# Patient Record
Sex: FEMALE | Race: WHITE
[De-identification: names, ages, dates, MRNs, and addresses within clinical notes are randomized per-mention and may not be internally consistent; named-entity substitution may affect disease eponyms.]

---

## 2017-01-25 ENCOUNTER — HOSPITAL ENCOUNTER (EMERGENCY)
Dept: HOSPITAL 75 - ER | Age: 67
Discharge: NURSING, REHAB OR LONG-TERM CARE | End: 2017-01-25
Payer: MEDICARE

## 2017-01-25 VITALS — BODY MASS INDEX: 32.2 KG/M2 | WEIGHT: 175 LBS | HEIGHT: 62 IN

## 2017-01-25 DIAGNOSIS — E11.9: ICD-10-CM

## 2017-01-25 DIAGNOSIS — G89.29: ICD-10-CM

## 2017-01-25 DIAGNOSIS — Z79.84: ICD-10-CM

## 2017-01-25 DIAGNOSIS — I10: ICD-10-CM

## 2017-01-25 DIAGNOSIS — S00.83XA: Primary | ICD-10-CM
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
## 2017-01-25 DIAGNOSIS — J44.9: ICD-10-CM

## 2017-01-25 DIAGNOSIS — M54.2: ICD-10-CM
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
## 2017-01-25 DIAGNOSIS — Z79.02: ICD-10-CM
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
## 2017-01-25 DIAGNOSIS — F17.210: ICD-10-CM

## 2017-01-25 PROCEDURE — 70450 CT HEAD/BRAIN W/O DYE: CPT

## 2017-01-25 PROCEDURE — 72125 CT NECK SPINE W/O DYE: CPT

## 2017-01-25 NOTE — DIAGNOSTIC IMAGING REPORT
PROCEDURE: CT head and CT cervical spine without contrast.



TECHNIQUE: Multiple contiguous axial images were obtained

through the brain and cervical spine without the use of

intravenous contrast. Sagittal and coronal reformations through

the cervical spine were then performed.



INDICATION:  Fall. Head injury.



COMPARISON: CT head without contrast 12/11/2012. Cervical spine

radiographs 5/19/2014.



FINDINGS:



CT HEAD: Generalized cerebral and cerebellar parenchymal volume

loss has progressed since the prior exam. Chronic infarct in the

left chin. Diffuse nonspecific hypointensities in the

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

progressed since the prior exam. No intracranial hemorrhage,

mass effect, hydrocephalus or extra-axial fluid collections.

Intracranial vascular calcifications. Left frontal scalp

hematoma. Osseous structures are intact. Mild mucosal thickening

in the maxillary sinuses.



CT CERVICAL SPINE: Minimal anterolisthesis of C4 on C5 and C5 on

C6 is similar to the prior radiographs. Vertebral body heights

are maintained. No fractures. Moderate degenerative endplate

changes and facet arthropathy. These result in no high-grade

neural impingement on this noncontrast exam. Arterial

calcifications including the carotid bifurcations.



IMPRESSION:

1. Left frontal scalp hematoma. Osseous structures are intact.

2. Interval progression of generalized parenchymal volume loss

and leukoaraiosis. No acute intracranial CT findings.

3. No acute cervical spine CT findings.







Dictated by:



Dictated on workstation # [id number]

## 2017-01-25 NOTE — XMS REPORT
Continuity of Care Document

 Created on: 2015



JAMILA REYNOLDS S

External Reference #: [id number]

: 1950

Sex: Female



Demographics







 Address  [address] [place]  [address]

 

 Home Phone  [phone]

 

 Preferred Language  English

 

 Marital Status  Unknown

 

 Church Affiliation  Unknown

 

 Race  Unknown

 

 Ethnic Group  Unknown





Author







 Author  [facility]

 

 Organization  [facility]

 

 Address  Unknown

 

 Phone  Unavailable







Support







 Name  Relationship  Address  Phone

 

 RODY MOSER DO  Caregiver  [address]

[place]  [address] [phone]

 

 COLLEEN BARNEY  Caregiver  [address] [place]  [address] [phone]

 

 HUANG ARIAS MD  Caregiver  [address], [address]

[place]  [address] [phone]

 

 DENISE GRUBER  Next Of Kin  [address] [place]  [address] [phone]







Care Team Providers







 Care Team Member Name  Role  Phone

 

 RODY MOSER DO  PCP  [phone]







Insurance Providers







 Payer Name  Policy Number  Subscriber Name  Relationship

 

 [employer]  [id number]  Jamila Reynolds S  16 Self / Same As Patient

 

 [employer]  [id number]  Jamila Reynolds S  16 Self / Same As Patient







Advance Directives







 Directive  Response  Recorded Date/Time

 

 Advance Directives  No  01/24/15 4:21pm

 

 Organ Donor  No  01/24/15 4:21pm

 

 Resuscitation Status  Full Code  01/24/15 4:21pm







Problems

No known problems or medical conditions.



Medications







 Medication  Dose  Route  Sig  Days/Qty  Instructions  Order Date  Discontinued 
Date  Status

 

 Pantoprazole Sodium  40 Mg  PO  DAILY        08     Active

 

 Atorvastatin Calcium  40 Mg  PO  DAILY        08     Active

 

 Glyburide                 08  Discontinued

 

 Metformin HCl (Glucophage)  500 Mg  PO  TWICE A DAY     1 TAB BY MOUTH BEFORE 
BREAKFAST AND 1 TAB EVERY EVENING  08     Active

 

 Aspirin  325 Mg  PO  DAILY        08  Discontinued

 

 Amlodipine Besylate  5 Mg  PO  DAILY        07/01/08  01/24/15  Discontinued

 

 Quinapril HCl  20 Mg  PO  DAILY        08     Active

 

 Zoloft                 08  Discontinued

 

 Dexlansoprazole  60 Mg  PO           12  Discontinued

 

 Sertraline HCl  100 Mg  PO  DAILY        12  Discontinued

 

 Cephalexin Monohydrate (Keflex)  500 Mg  PO  THREE TIMES A DAY        12  Discontinued

 

 [Proair Inhaler]  1 Puff  IH  THREE TIMES A DAY PRN        12     Active

 

 Clopidogrel Bisulfate  1 Each  PO  DAILY        12     Active

 

 Naproxen  1 Each  PO  THREE TIMES A DAY PRN PAIN  20 Qty     14     
Active

 

 Orphenadrine Citrate  100 Mg  PO  TWICE A DAY  10 Qty  FOR MUSCLE SPASMS  04/07
/14  01/24/15  Discontinued

 

 Tramadol HCl  50 Mg  PO  Q4-6HR PRN PAIN  20 Qty     14     Active

 

 Amlodipine Besylate (Norvasc 5 Mg)           30 Qty     01/24/15     Active

 

 Quinapril Hcl           30 Qty     01/24/15     Active

 

 Naproxen           60 Qty     01/24/15     Active

 

 Tramadol Hcl           60 Qty     01/24/15     Active

 

 Metformin Hcl           60 Qty     01/24/15     Active

 

 Prednisone  40 Mg  PO  DAILY  10 Qty     01/24/15     Active

 

 Cyclobenzaprine HCl (Flexeril)  1 Each  PO  EVERY 8HRS PRN SPASMS  10 Qty     
15  01/24/15  Discontinued

 

 Baclofen (Lioresal)  1 Each  PO  TID PRN PRN SPASMS  10 Qty     01/24/15     
Active







Social History







 Social History Problem  Response  Recorded Date/Time

 

 Alcohol Use  Denies Use  2015 4:21pm

 

 Recreational Drug Use  No  2015 4:21pm

 

 Recent Foreign Travel  No  2015 4:19pm

 

 Smoking Status  Current Everyday Smoker  2015 4:21pm









 Query  Response  Start Date  Stop Date

 

 Smoking Status  Current Everyday Smoker      







Hospital Discharge Instructions

No hospital discharge instructions.



Plan of Care

No plan of care.



Functional Status

No functional status results.



Allergies, Adverse Reactions, Alerts







 Allergen  Type  Severity  Reaction  Status  Last Updated

 

 Penicillins ([id number])  Allergy  Unknown     Active  08

 

 morphine  Allergy  Mild     Active  14







Immunizations

No immunization records.



Vital Signs

Acute Vital Signs





 Vital  Response  Date/Time

 

 Temperature (Fahrenheit)  97.4 degrees F (97.6 - 99.5)   

 

 Temperature (Calculated Celsius)  36.24433 degrees C (36.4 - 37.5)   

 

 Temperature Source  Temporal     

 

 Pulse Rate (adult)  82 bpm (60 - 90)   

 

 Respiratory Rate  18 bpm (12 - 24)   

 

 O2 Sat by Pulse Oximetry  95 % (88 - 100)   

 

 Blood Pressure  149/83 mm Hg   

 

 Pain      

 

    Pain Intensity  10     

 

 Height (Feet)  5 feet    

 

 Height (Calculated Centimeters)  152.453516 cm    

 

 Weight (Pounds)  237 pounds    

 

 Weight (Calculated Grams)  724452.393 gm    

 

 Weight (Calculated Kilograms)  107.316874 kilograms    

 

 Height  5 ft 0 in    

 

 Weight  237 lb    

 

 Body Mass Index  46.3 kg/m^2    







Results

No known relevant diagnostic tests, laboratory data and/or discharge summary.



Procedures

No known history of procedures.



Encounters







 Encounter  Location  Date/Time

 

 Departed Emergency Room  [facility]  01/24/15 4:11pm











 Recent Diagnosis

## 2017-01-25 NOTE — ED FALL/INJURY
General


Chief Complaint:  Trauma-Non Activation


Stated Complaint:  FALL/HEAD INJURY


Nursing Triage Note:  


PT ARRIVED PER EMS, PT FELL OUT OF BED AT [facility] THIS AM, PT DENIES LOC, PT STATES 


ONLY HURTS WHERE BUMP ON HEAD IS, DENIES NEW NECK PAIN, STATES ALWAYS HAS NECK 


PAIN.


Source:  patient, EMS, nursing home records





History of Present Illness


Time seen by provider:  10:22


Initial Comments


[facility]


PT STATES SHE WAS LAYING IN BED AND LEANED OVER AND WAS REACHING DOWN TO GET 

HER SHOES AND FELL OFF THE BED, AND HIT HER HEAD ON A TRASH CAN--OCCURRED JUST  

PRIOR TO ARRIVAL


NO LOSS OF CONSCIOUSNESS


NO DIZZINESS


NO VISION CHANGES


NO NAUSEA/VOMITING


NO PARESTHESIAS OR MOTOR DEFICITS


NO CHANGES IN CHRONIC NECK PAIN 


NO OTHER INJURIES








PT STATES SHE HAD NOT HAD ANY OF HER MORNING MEDICATIONS UNTIL IMMEDIATELY 

PRIOR TO ARRIVAL


Location Injury Occurred:  [facility]





PCP: DR. MOSER





Allergies and Home Medications


Allergies


Coded Allergies:  


     morphine (Unverified  Allergy, Mild, 14)


 "ACTS WEIRD"


     Penicillins (Unverified  Allergy, Unknown, 08)





Home Medications


  1 PUFF IH TID PRN PRN  (Reported) 


Amlodipine Besylate 5 Mg Tablet #30 (Reported) 


Atorvastatin 20 Mg Tablet 40 MG PO DAILY (Reported) 


Baclofen 10 Mg Tablet #10 1 EACH PO TID PRN PRN PRN SPASMS 


   Prescribed by: COLLEEN BARNEY on 1/24/15 1756


Clopidogrel Bisulfate 75 Mg Tablet 1 EACH PO DAILY (Reported) 


Metformin Hcl 500 Mg Tablet 500 MG PO BID (Reported) 


   1 TAB BY MOUTH BEFORE BREAKFAST AND 1 TAB EVERY EVENING DO NOT START UNTIL 12

-13-12 


Metformin Hcl 500 Mg Tablet #60 (Reported) 


Naproxen 500 Mg Tablet #20 1 EACH PO TID PRN PRN PAIN 


   FOR PAIN 


   Prescribed by: VICTORINA LUNSFORD on 14 1612


Naproxen 500 Mg Tablet #60 (Reported) 


Pantoprazole Sodium 40 Mg Tablet.dr 40 MG PO DAILY (Reported) 


Prednisone 20 Mg Tab #10 40 MG PO DAILY 


   Prescribed by: COLLEEN BARNEY on 1/24/15 1724


Quinapril Hcl 20 Mg Tablet 20 MG PO DAILY (Reported) 


Quinapril Hcl 20 Mg Tablet #30 (Reported) 


Tramadol Hcl 50 Mg Tab #20 50 MG PO Q4-6HR PRN PRN PAIN 


   FOR PAIN 


   Prescribed by: VICTORINA LUNSFORD on 14 1612


Tramadol Hcl 50 Mg Tab #60 (Reported) 





Constitutional:   no symptoms reported other (HEMATOMA TO LEFT FOREHEAD)


Eyes:   No Symptoms Reported


Ears, Nose, Mouth, Throat:   no symptoms reported


Respiratory:   no symptoms reported


Cardiovascular:   no symptoms reported


Gastrointestinal:   no symptoms reported


Genitourinary:   no symptoms reported


Musculoskeletal:   no symptoms reported


Skin:   no symptoms reported


Psychiatric/Neurological:   No Symptoms Reported





Past Medical-Social-Family Hx


Patient Social History


Alcohol Use:  Past History (NONE X 10-15 YEARS, PER PT ON 17)


Recreational Drug Use:  No


Smoking Status:  Current Everyday Smoker (2 1/2 PPD, NOW 1/2 PPD)


Type Used:  Cigarettes


Recent Foreign Travel:  No


Contact w/Someone Who Travel:  No


Recent Infectious Disease Expo:  No


Recent Hopitalizations:  No (QUIQUE-UNSURE WHAT FOR AND WHEN)


Physical Abuse Screen:  No


Sexual Abuse:  No





Seasonal Allergies


Seasonal Allergies:  No





Surgeries


HX Surgeries:  Yes (CATARACTS)


Surgeries:   Section, Eye Surgery, Gallbladder





Respiratory


Hx Respiratory Disorders:  Yes


Respiratory Disorders:  COPD





Cardiovascular


Hx Cardiac Disorders:  Yes


Cardiac Disorders:  Coronary Artery Disease, High Cholesterol, Hypertension





Neurological


Hx Neurological Disorders:  Yes


Neurological Disorders:  Stroke





Reproductive System


Hx Reproductive Disorders:  No





Genitourinary


Hx Genitourinary Disorders:  Yes (INCONTINENT)





Gastrointestinal


Hx Gastrointestinal Disorders:  Yes


Gastrointestinal Disorders:  Chronic Constipation





Musculoskeletal


Hx Musculoskeletal Disorders:  Yes (CHRONIC NECK AND BACK PAIN ; UNSTEADY GAIT 

AND GENERALIZED WEAKNESS)


Musculoskeletal Disorders:  Arthritis, Chronic Back Pain





Endocrine


Hx Endocrine Disorders:  Yes


Endocrine Disorders:  Diabetes, Non-Insulin dep





Cancer


Hx Cancer:  No





Psychosocial


Hx Psychiatric Problems:  Yes


Behavioral Health Disorders:  Depression





Integumentary


HX Skin/Integumentary Disorder:  No





Blood Transfusions


Hx Blood Disorders:  No





Physical Exam


Vital Signs





 Vital Sign - Last 12Hours








 17





 10:21


 


Temp 97.9


 


Pulse 68


 


Resp 18


 


B/P 198/79


 


Pulse Ox 99





Capillary Refill : Less Than 3 Seconds


General Appearance:   WD/WN no apparent distress


HEENT:   PERRL/EOMI TMs normal other (LARGE HEMATOMA TO LEFT FOREHEAD.   POOR 

DENTITON WITH MULTIPLE MISSING TEETH)


Neck:   tender lateral tender midline other (PT STATES IS CHRONIC AND NO 

DIFFERENT THAN NORMAL)


Cardiovascular:   regular rate, rhythm no murmur


Respiratory:   normal breath sounds no respiratory distress no accessory muscle 

use


Gastrointestinal:   non tender soft


Back:   no CVA tenderness no vertebral tenderness


Extremities:   normal range of motion non-tender normal inspection no pedal 

edema no calf tenderness


Neurologic/Psychiatric:   CNs II-XII nml as tested no motor/sensory deficits 

alert normal mood/affect oriented x 3


Skin:   normal color warm/dry





[place] Coma Score


Best Eye Response:  (4) Open Spontaneously


Best Verbal Response:  (5) Oriented


Best Motor Response:  (6) Obeys Commands


Arpit Total:  15





Progress/Results/Core Measures


Results/Orders


My Orders





 Orders-VICTORINA LUNSFORD DO


Ct Head/Cervical Spine Wo (17 10:31)





Vital Signs/I&O





 Vital Sign - Last 12Hours








 17





 10:21


 


Temp 97.9


 


Pulse 68


 


Resp 18


 


B/P 198/79


 


Pulse Ox 99








Blood Pressure Mean:  118


Progress Note :  


Progress Note


PT REFUSES TO HAVE BP TAKEN AGAIN





Diagnostic Imaging





Comments


CT HEAD/CERVICAL SPINE--LEFT FRONTAL SCALP HEMATOMA, OTHERWISE NO ACUTE PROCESS

, CHRONIC CHANGES--PER RADIOLOGIST REPORT @ 1113


   Reviewed:  Reviewed by Me





Departure


Impression


Impression:  


 Primary Impression:  


 S/P FALL


 Additional Impressions:  


 HEAD CONTUSION WITH HEMATOMA


 EXACERABATION OF CHRONIC NECK PAIN


Disposition:  [facility] XFER SNF


Condition:  Stable





Departure-Patient Inst.


Referrals:  


RODY MOSER DO (PCP/Family)


Primary Care Physician


Patient Instructions:  HEMATOMA, Minor Head Injury (DC), Preventing Falls in 

the Older Adult





Add. Discharge Instructions:


ICE TO AREA AT 20 MINUTE INTERVALS





TYLENOL AS NEEDED FOR PAIN 





CONTINUE YOUR REGULAR MEDICATIONS AS PRESCRIBED





FOLLOW UP WITH YOUR DR AS NEEDED





All discharge instructions reviewed with patient and/or family. Voiced 

understanding.








VICTORINA LUNSFORD DO 2017 11:09

## 2017-02-19 ENCOUNTER — HOSPITAL ENCOUNTER (EMERGENCY)
Dept: HOSPITAL 75 - ER | Age: 67
Discharge: HOME | End: 2017-02-19
Payer: MEDICARE

## 2017-02-19 VITALS — WEIGHT: 175.63 LBS | HEIGHT: 62 IN | BODY MASS INDEX: 32.32 KG/M2

## 2017-02-19 VITALS — SYSTOLIC BLOOD PRESSURE: 155 MMHG | DIASTOLIC BLOOD PRESSURE: 84 MMHG

## 2017-02-19 DIAGNOSIS — S00.03XA: Primary | ICD-10-CM
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
## 2017-02-19 DIAGNOSIS — M51.36: ICD-10-CM
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
## 2017-02-19 DIAGNOSIS — Z79.02: ICD-10-CM

## 2017-02-19 DIAGNOSIS — Z79.84: ICD-10-CM

## 2017-02-19 DIAGNOSIS — Z79.899: ICD-10-CM

## 2017-02-19 DIAGNOSIS — M54.5: ICD-10-CM

## 2017-02-19 DIAGNOSIS — J44.9: ICD-10-CM

## 2017-02-19 DIAGNOSIS — F17.210: ICD-10-CM

## 2017-02-19 LAB
BILIRUB UR QL STRIP: NEGATIVE
KETONES UR QL STRIP: NEGATIVE
LEUKOCYTE ESTERASE UR QL STRIP: NEGATIVE
NITRITE UR QL STRIP: NEGATIVE
PH UR STRIP: 6.5 [PH] (ref 5–9)
PROT UR QL STRIP: (no result)
RENAL EPI CELLS #/AREA URNS HPF: (no result) /HPF
SP GR UR STRIP: 1.01 (ref 1.02–1.02)
SQUAMOUS #/AREA URNS HPF: (no result) /HPF
UROBILINOGEN UR-MCNC: NORMAL MG/DL
WBC #/AREA URNS HPF: (no result) /HPF

## 2017-02-19 PROCEDURE — 81000 URINALYSIS NONAUTO W/SCOPE: CPT

## 2017-02-19 PROCEDURE — 72100 X-RAY EXAM L-S SPINE 2/3 VWS: CPT

## 2017-02-19 PROCEDURE — 73030 X-RAY EXAM OF SHOULDER: CPT

## 2017-02-19 PROCEDURE — 73080 X-RAY EXAM OF ELBOW: CPT

## 2017-02-19 PROCEDURE — 99283 EMERGENCY DEPT VISIT LOW MDM: CPT

## 2017-02-19 NOTE — DIAGNOSTIC IMAGING REPORT
INDICATION: Fall.



FINDINGS: 3 views show no fractures or dislocations of the

elbow. Mild degenerative changes are present. No joint effusion.



IMPRESSION: No acute abnormalities.



Dictated by:



Dictated on workstation # [id number]

## 2017-02-19 NOTE — DIAGNOSTIC IMAGING REPORT
INDICATION: Fall. Low back pain.



FINDINGS: 3 views show good alignment of the vertebral bodies.

Body height is well maintained with no compression fracture.

Facets are in good alignment. No evidence of pars defect. Mild

degenerative disc disease noted throughout. Aorta is calcified

without evidence of aneurysm.



IMPRESSION: Degenerative disc and facet disease with no acute

abnormalities demonstrated.



Dictated by:



Dictated on workstation # [id number]

## 2017-02-19 NOTE — ED UPPER EXTREMITY
General


Chief Complaint:  Trauma-Non Activation


Stated Complaint:  FALL


Source:  patient, EMS


Exam Limitations:  no limitations





History of Present Illness


Time seen by provider:  09:27


Initial Comments


Patient presents after having a fall approximately 5:30 this morning after she 

got up she said she was a little disoriented and fell from standing at the 

floor with her head and left arm on the elbow. She still having pain in her 

left arm and is splinting it against her side. She is left-handed. She has a 

goose egg on her left side of her parietal skull that is not very tender. She 

notes that her shoulder and elbow are 11 out of 10 pain but does not desire any 

pain medicine at this time. She   states she has pain in her lower back on top 

of her chronic pain that is new since her fall this morning. She lives in 

nursing home and had help getting up and was examined by the nurse there 

appears no immediate concern for open/overt fracture/dislocation but her doctor 

asked that she be brought to the ER for x-ray. No loss of consciousness.





Allergies and Home Medications


Allergies


Coded Allergies:  


     morphine (Unverified  Allergy, Mild, 14)


 "ACTS WEIRD"


     Penicillins (Unverified  Allergy, Unknown, 08)





Home Medications


  1 PUFF IH TID PRN PRN  (Reported) 


Amlodipine Besylate 5 Mg Tablet #30 (Reported) 


Atorvastatin 20 Mg Tablet 40 MG PO DAILY (Reported) 


Baclofen 10 Mg Tablet #10 1 EACH PO TID PRN PRN PRN SPASMS 


   Prescribed by: COLLEEN BARNEY on 1/24/15 1756


Clopidogrel Bisulfate 75 Mg Tablet 1 EACH PO DAILY (Reported) 


Metformin Hcl 500 Mg Tablet 500 MG PO BID (Reported) 


   1 TAB BY MOUTH BEFORE BREAKFAST AND 1 TAB EVERY EVENING DO NOT START UNTIL 12

-13-12 


Metformin Hcl 500 Mg Tablet #60 (Reported) 


Naproxen 500 Mg Tablet #20 1 EACH PO TID PRN PRN PAIN 


   FOR PAIN 


   Prescribed by: VICTORINA LUNSFORD on 14 1612


Naproxen 500 Mg Tablet #60 (Reported) 


Pantoprazole Sodium 40 Mg Tablet.dr 40 MG PO DAILY (Reported) 


Prednisone 20 Mg Tab #10 40 MG PO DAILY 


   Prescribed by: COLLEEN BARNEY on 1/24/15 1724


Quinapril Hcl 20 Mg Tablet 20 MG PO DAILY (Reported) 


Quinapril Hcl 20 Mg Tablet #30 (Reported) 


Tramadol Hcl 50 Mg Tab #20 50 MG PO Q4-6HR PRN PRN PAIN 


   FOR PAIN 


   Prescribed by: VICTORINA LUNSFORD on 14 1612


Tramadol Hcl 50 Mg Tab #60 (Reported) 





Constitutional:  No chills, No fever, No malaise


EENTM:  No ear pain, No eye pain


Respiratory:  No cough, No wheezing


Cardiovascular:  No chest pain, No edema, No syncope


Gastrointestinal:  No abdominal pain,  constipationNo diarrhea


Genitourinary:  No dysuria, No frequency


Musculoskeletal:   back painNo joint pain, No joint swelling


Skin:  No pruritus, No rash


Psychiatric/Neurological:  Denies Numbness, Denies Paresthesia





Past Medical-Social-Family Hx


Patient Social History


Alcohol Use:  Denies Use


Recreational Drug Use:  No


Smoking Status:  Current Everyday Smoker


Type Used:  Cigarettes


Recent Hopitalizations:  No (QUIQUE-UNSURE WHAT FOR AND WHEN)





Seasonal Allergies


Seasonal Allergies:  No





Surgeries


HX Surgeries:  Yes (CATARACTS)


Surgeries:   Section, Eye Surgery, Gallbladder





Respiratory


Hx Respiratory Disorders:  Yes


Respiratory Disorders:  COPD





Cardiovascular


Hx Cardiac Disorders:  Yes


Cardiac Disorders:  Coronary Artery Disease, High Cholesterol, Hypertension





Neurological


Hx Neurological Disorders:  Yes


Neurological Disorders:  Stroke





Reproductive System


Hx Reproductive Disorders:  No





Genitourinary


Hx Genitourinary Disorders:  Yes (INCONTINENT)





Gastrointestinal


Hx Gastrointestinal Disorders:  Yes


Gastrointestinal Disorders:  Chronic Constipation





Musculoskeletal


Hx Musculoskeletal Disorders:  Yes (CHRONIC NECK AND BACK PAIN ; UNSTEADY GAIT 

AND GENERALIZED WEAKNESS)


Musculoskeletal Disorders:  Arthritis, Chronic Back Pain





Endocrine


Hx Endocrine Disorders:  Yes


Endocrine Disorders:  Diabetes, Non-Insulin dep





Cancer


Hx Cancer:  No





Psychosocial


Hx Psychiatric Problems:  Yes


Behavioral Health Disorders:  Depression





Integumentary


HX Skin/Integumentary Disorder:  No





Blood Transfusions


Hx Blood Disorders:  No





Physical Exam


Vital Signs





 Vital Sign - Last 12Hours








 17





 09:31


 


Temp 98.4


 


Pulse 69


 


Resp 18


 


B/P 195/92


 


Pulse Ox 97


 


O2 Delivery Room Air





Capillary Refill :


General Appearance:   WD/WN no apparent distress


HEENT:   PERRL/EOMI normal ENT inspection TMs normal pharynx normal other (

hematoma left parietal.)


Neck:   non-tender full range of motion supple normal inspection


Cardiovascular:   normal peripheral pulses regular rate, rhythm no edema


Respiratory:   chest non-tender lungs clear normal breath sounds


Gastrointestinal:   normal bowel sounds non tender soft


Back:   normal inspection no CVA tenderness vertebral tenderness (lateral to 

the lumbar 1 through 3 region)


Shoulder:   normal inspectionNo asymmetry, No deformity,  pain (tender to 

palpation superior to the glenohumeral joint)


Elbow/Forearm:  normal inspection, non-tender (over lateral or medial 

epicondyles but there is some tenderness along the distal third of the humerus.)


Wrist:  Yes normal inspection, Yes non-tender, Yes no evidence of injury


Hand:  normal inspection, non-tender


Neurologic/Tendon:   normal sensation normal motor functions normal tendon 

functions responds to pain


Neurologic/Psychiatric:   CNs II-XII nml as tested alert normal mood/affect 

oriented x 3


Skin:   normal color warm/dry


Lymphatic:   no adenopathy





Progress/Results/Core Measures


Results/Orders


Lab Results





 Laboratory Tests








Test


  17


10:20 Range/Units


 


 


Urine Bacteria TRACE   /HPF


 


Urine Bilirubin NEGATIVE  NEGATIVE  


 


Urine Casts NONE   /LPF


 


Urine Clarity CLEAR   


 


Urine Color YELLOW   


 


Urine Crystals NONE   /LPF


 


Urine Culture Indicated NO   


 


Urine Glucose (UA) NEGATIVE  NEGATIVE  


 


Urine Ketones NEGATIVE  NEGATIVE  


 


Urine Leukocyte Esterase NEGATIVE  NEGATIVE  


 


Urine Mucus NEGATIVE   /LPF


 


Urine Nitrite NEGATIVE  NEGATIVE  


 


Urine Protein 2+ H NEGATIVE  


 


Urine RBC NONE   /HPF


 


Urine RBC (Auto) NEGATIVE  NEGATIVE  


 


Urine Renal Epithelial Cells NONE   /HPF


 


Urine Specific Gravity 1.010 L 1.016-1.022  


 


Urine Squamous Epithelial


Cells 0-2 


   /HPF


 


 


Urine Urobilinogen NORMAL  NORMAL  MG/DL


 


Urine WBC NONE   /HPF


 


Urine pH 6.5  5-9  








My Orders





 Orders-CESAR MILLS


Ua Culture If Indicated (17 09:35)


Shoulder, Left, 3 Views (17 09:35)


Elbow, Left, 3 Views (17 09:35)


Lumbar Spine - 2-3 Views (17 09:35)


Acetaminophen  Tablet (Tylenol  Tablet) (17 12:39)





Vital Signs/I&O





 Vital Sign - Last 12Hours








 17





 09:31 12:48


 


Temp 98.4 98.4


 


Pulse 69 74


 


Resp 18 16


 


B/P 195/92 


 


Pulse Ox 97 97


 


O2 Delivery Room Air 








Progress Note :  


   Time:  09:50


Progress Note


We'll obtain a urinalysis looking for signs of a urinary tract infection given 

no other overt reason for her fall other understanding early in the morning. X-

ray shoulder and left elbow as well as lumbar spine 3 views. Tenderness on the 

spine is paraspinous so may be more amenable to muscle relaxers if no osseous 

fracture seen. Patient declining any pain meds at this time. States she does 

not believe in pain meds. Declines Muscle relaxant.


UA Neg





Diagnostic Imaging





   Diagonstic Imaging:  Xray


   Plain Films/CT/US/NM/MRI:  elbow, other (shoulder and L-spine)


Comments


No acute osseous fractures noted. No tumor, mass lesion or signs of infection. 

Mild-to-moderate degenerative joint disease noted.





NAME:      DESTINEY REYNOLDS


[facility] REC#:   [id number]


ACCOUNT#:   [id number]


PT STATUS:   REG ER


:      1950


PHYSICIAN:    CESAR MILLS MD


ADMIT DATE:   17/ER


 ***Signed***


Date of Exam:   17





ELBOW, LEFT, 3 VIEWS


 


INDICATION: Fall.





FINDINGS: 3 views show no fractures or dislocations of the


elbow. Mild degenerative changes are present. No joint effusion.





IMPRESSION: No acute abnormalities.





Dictated by:





Dictated on workstation # [id number]





Dict:   17 1005


Trans:   17 1009


DIAN  [id number]





Interpreted by:       SKYLAR PATRICIO MD


Electronically signed by:SKYLAR PATRICIO MD   17 1012


NAME:      DESTINEY REYNOLDS


[facility] REC#:   [id number]


ACCOUNT#:   [id number]


PT STATUS:   REG ER


:      1950


PHYSICIAN:    CESAR MILLS MD


ADMIT DATE:   17/ER


 ***Signed***


Date of Exam:   17





LUMBAR SPINE - 2-3 VIEWS


 


INDICATION: Fall. Low back pain.





FINDINGS: 3 views show good alignment of the vertebral bodies.


Body height is well maintained with no compression fracture.


Facets are in good alignment. No evidence of pars defect. Mild


degenerative disc disease noted throughout. Aorta is calcified


without evidence of aneurysm.





IMPRESSION: Degenerative disc and facet disease with no acute


abnormalities demonstrated.





Dictated by:





Dictated on workstation # [id number]





Dict:   17 1005


Trans:   17 1009


DIAN  [id number]





Interpreted by:       SKYLAR PATRICIO MD


Electronically signed by:SKYLAR PATRICIO MD   17 1012


   [facility]


   [place], [place]





NAME:   DESTINEY REYNOLDS


[facility] REC#:   [id number]


ACCOUNT#:   [id number]


PT STATUS:   REG ER


:   1950


PHYSICIAN:   CESAR MILLS MD


ADMIT DATE:   17/ER


   ***Draft***


Date of Exam:17





SHOULDER, LEFT, 3 VIEWS














INDICATION: Shoulder pain. Fall.





FINDINGS: 3 views show normal articulation of the humeral head


with the glenoid. There is calcification noted on the AP view


which likely represents some osteophyte formation along the


posterior glenoid. No definite fractures are seen. AC joint is


in good alignment.





IMPRESSION: Small calcification noted overlying the inferior


aspect of the humeral head on the AP view only is felt most


likely to represent an osteophyte off of the glenoid. Clinical


correlation for focal pain. If additional imaging is indicated


CT scan of the shoulder would help differentiate.





Dictated on workstation # [id number]








Dict:   17 1006


Trans:   17 1010


[facility] 9431-3592





Interpreted by:     SKYLAR PATRICIO MD


Electronically signed by:


   Reviewed:  Reviewed by Me





Departure


Impression


Impression:  


 Primary Impression:  


 Fall from standing


 Qualified Code:  W19.XXXA - Unspecified fall, initial encounter


Disposition:  01 HOME, SELF-CARE


Condition:  Stable





Departure-Patient Inst.


Referrals:  


RODY MOSER DO (PCP/Family)


Primary Care Physician


Patient Instructions:  Preventing Falls in the Older Adult





Add. Discharge Instructions:


You have had a fall. The x-rays did not show any signs of an acute fracture. 

You did have some mild degenerative changes that are consistent with 

osteoarthritis or chronic arthritis. Your urine was checked for infection and 

found to be clean. Use the call lights and staff to help you as needed. 





All discharge instructions reviewed with patient and/or family. Voiced 

understanding.





Copy


Copies To 1:   RODY MOSER TITUS J 2017 09:34

## 2017-02-19 NOTE — XMS REPORT
Continuity of Care Document

 Created on: 2015



JAMILA REYNOLDS S

External Reference #: [id number]

: 1950

Sex: Female



Demographics







 Address  [address] [place]  [address]

 

 Home Phone  [phone]

 

 Preferred Language  English

 

 Marital Status  Unknown

 

 Mandaen Affiliation  Unknown

 

 Race  Unknown

 

 Ethnic Group  Unknown





Author







 Author  [facility]

 

 Organization  [facility]

 

 Address  Unknown

 

 Phone  Unavailable







Support







 Name  Relationship  Address  Phone

 

 RODY MOSER DO  Caregiver  [address]

[place]  [address] [phone]

 

 COLLEEN BARNEY  Caregiver  [address] [place]  [address] [phone]

 

 HUANG ARIAS MD  Caregiver  [address]

[place]  [address] [phone]

 

 DENISE GRUBER  Next Of Kin  [address] [place]  [address] [phone]







Care Team Providers







 Care Team Member Name  Role  Phone

 

 RODY MOSER DO  PCP  [phone]







Insurance Providers







 Payer Name  Policy Number  Subscriber Name  Relationship

 

 [employer]  [id number]  Jamila Reynolds S  18 Self / Same As Patient

 

 [employer]  [id number]  Jamila Reynolds S  18 Self / Same As Patient







Advance Directives







 Directive  Response  Recorded Date/Time

 

 Advance Directives  No  01/24/15 4:21pm

 

 Organ Donor  No  01/24/15 4:21pm

 

 Resuscitation Status  Full Code  01/24/15 4:21pm







Problems

No known problems or medical conditions.



Medications







 Medication  Dose  Route  Sig  Days/Qty  Instructions  Order Date  Discontinued 
Date  Status

 

 Pantoprazole Sodium  40 Mg  PO  DAILY        08     Active

 

 Atorvastatin Calcium  40 Mg  PO  DAILY        08     Active

 

 Glyburide                 08  Discontinued

 

 Metformin HCl (Glucophage)  500 Mg  PO  TWICE A DAY     1 TAB BY MOUTH BEFORE 
BREAKFAST AND 1 TAB EVERY EVENING  08     Active

 

 Aspirin  325 Mg  PO  DAILY        08  Discontinued

 

 Amlodipine Besylate  5 Mg  PO  DAILY        07/01/08  01/24/15  Discontinued

 

 Quinapril HCl  20 Mg  PO  DAILY        08     Active

 

 Zoloft                 08  Discontinued

 

 Dexlansoprazole  60 Mg  PO           12  Discontinued

 

 Sertraline HCl  100 Mg  PO  DAILY        12  Discontinued

 

 Cephalexin Monohydrate (Keflex)  500 Mg  PO  THREE TIMES A DAY        12  Discontinued

 

 [Proair Inhaler]  1 Puff  IH  THREE TIMES A DAY PRN        12     Active

 

 Clopidogrel Bisulfate  1 Each  PO  DAILY        12     Active

 

 Naproxen  1 Each  PO  THREE TIMES A DAY PRN PAIN  20 Qty     14     
Active

 

 Orphenadrine Citrate  100 Mg  PO  TWICE A DAY  10 Qty  FOR MUSCLE SPASMS  04/07
/14  01/24/15  Discontinued

 

 Tramadol HCl  50 Mg  PO  Q4-6HR PRN PAIN  20 Qty     14     Active

 

 Amlodipine Besylate (Norvasc 5 Mg)           30 Qty     01/24/15     Active

 

 Quinapril Hcl           30 Qty     01/24/15     Active

 

 Naproxen           60 Qty     01/24/15     Active

 

 Tramadol Hcl           60 Qty     01/24/15     Active

 

 Metformin Hcl           60 Qty     01/24/15     Active

 

 Prednisone  40 Mg  PO  DAILY  10 Qty     01/24/15     Active

 

 Cyclobenzaprine HCl (Flexeril)  1 Each  PO  EVERY 8HRS PRN SPASMS  10 Qty     
15  01/24/15  Discontinued

 

 Baclofen (Lioresal)  1 Each  PO  TID PRN PRN SPASMS  10 Qty     01/24/15     
Active







Social History







 Social History Problem  Response  Recorded Date/Time

 

 Alcohol Use  Denies Use  2015 4:21pm

 

 Recreational Drug Use  No  2015 4:21pm

 

 Recent Foreign Travel  No  2015 4:19pm

 

 Smoking Status  Current Everyday Smoker  2015 4:21pm









 Query  Response  Start Date  Stop Date

 

 Smoking Status  Current Everyday Smoker      







Hospital Discharge Instructions

No hospital discharge instructions.



Plan of Care

No plan of care.



Functional Status

No functional status results.



Allergies, Adverse Reactions, Alerts







 Allergen  Type  Severity  Reaction  Status  Last Updated

 

 Penicillins ([id number])  Allergy  Unknown     Active  08

 

 morphine  Allergy  Mild     Active  14







Immunizations

No immunization records.



Vital Signs

Acute Vital Signs





 Vital  Response  Date/Time

 

 Temperature (Fahrenheit)  97.4 degrees F (97.6 - 99.5)   

 

 Temperature (Calculated Celsius)  36.13416 degrees C (36.4 - 37.5)   

 

 Temperature Source  Temporal     

 

 Pulse Rate (adult)  82 bpm (60 - 90)   

 

 Respiratory Rate  18 bpm (12 - 24)   

 

 O2 Sat by Pulse Oximetry  95 % (88 - 100)   

 

 Blood Pressure  149/83 mm Hg   

 

 Pain      

 

    Pain Intensity  10     

 

 Height (Feet)  5 feet    

 

 Height (Calculated Centimeters)  152.000522 cm    

 

 Weight (Pounds)  237 pounds    

 

 Weight (Calculated Grams)  669463.393 gm    

 

 Weight (Calculated Kilograms)  107.320740 kilograms    

 

 Height  5 ft 0 in    

 

 Weight  237 lb    

 

 Body Mass Index  46.3 kg/m^2    







Results

No known relevant diagnostic tests, laboratory data and/or discharge summary.



Procedures

No known history of procedures.



Encounters







 Encounter  Location  Date/Time

 

 Departed Emergency Room  [facility]  01/24/15 4:11pm











 Recent Diagnosis

## 2017-02-19 NOTE — DIAGNOSTIC IMAGING REPORT
INDICATION: Shoulder pain. Fall.



FINDINGS: 3 views show normal articulation of the humeral head

with the glenoid. There is calcification noted on the AP view

which likely represents some osteophyte formation along the

posterior glenoid. No definite fractures are seen. AC joint is

in good alignment.



IMPRESSION: Small calcification noted overlying the inferior

aspect of the humeral head on the AP view only is felt most

likely to represent an osteophyte off of the glenoid. Clinical

correlation for focal pain. If additional imaging is indicated

CT scan of the shoulder would help differentiate.



Dictated by:



Dictated on workstation # [id number]

## 2017-05-07 ENCOUNTER — HOSPITAL ENCOUNTER (OUTPATIENT)
Dept: HOSPITAL 75 - LABNPT | Age: 67
End: 2017-05-07
Attending: FAMILY MEDICINE
Payer: MEDICARE

## 2017-05-07 DIAGNOSIS — R39.9: Primary | ICD-10-CM

## 2017-05-07 LAB
BILIRUB UR QL STRIP: NEGATIVE
KETONES UR QL STRIP: NEGATIVE
LEUKOCYTE ESTERASE UR QL STRIP: NEGATIVE
NITRITE UR QL STRIP: NEGATIVE
PH UR STRIP: 5 [PH] (ref 5–9)
PROT UR QL STRIP: (no result)
SP GR UR STRIP: 1.02 (ref 1.02–1.02)
UROBILINOGEN UR-MCNC: NORMAL MG/DL

## 2017-05-07 PROCEDURE — 81000 URINALYSIS NONAUTO W/SCOPE: CPT

## 2017-05-07 PROCEDURE — 87088 URINE BACTERIA CULTURE: CPT

## 2017-05-08 LAB
SQUAMOUS #/AREA URNS HPF: (no result) /HPF
WBC #/AREA URNS HPF: (no result) /HPF

## 2017-05-20 ENCOUNTER — HOSPITAL ENCOUNTER (OUTPATIENT)
Dept: HOSPITAL 75 - LABNPT | Age: 67
End: 2017-05-20
Attending: FAMILY MEDICINE
Payer: MEDICARE

## 2017-05-20 DIAGNOSIS — E78.4: ICD-10-CM

## 2017-05-20 DIAGNOSIS — E56.9: ICD-10-CM

## 2017-05-20 DIAGNOSIS — K59.00: Primary | ICD-10-CM

## 2017-05-20 PROCEDURE — 82274 ASSAY TEST FOR BLOOD FECAL: CPT

## 2017-06-07 ENCOUNTER — HOSPITAL ENCOUNTER (OUTPATIENT)
Dept: HOSPITAL 75 - LAB | Age: 67
End: 2017-06-07
Attending: FAMILY MEDICINE
Payer: MEDICARE

## 2017-06-07 DIAGNOSIS — R19.7: Primary | ICD-10-CM

## 2017-06-07 DIAGNOSIS — R19.5: ICD-10-CM

## 2017-06-07 PROCEDURE — 87045 FECES CULTURE AEROBIC BACT: CPT

## 2017-06-07 PROCEDURE — 82274 ASSAY TEST FOR BLOOD FECAL: CPT

## 2017-06-07 PROCEDURE — 87493 C DIFF AMPLIFIED PROBE: CPT

## 2017-06-07 PROCEDURE — 87046 STOOL CULTR AEROBIC BACT EA: CPT

## 2017-07-06 ENCOUNTER — HOSPITAL ENCOUNTER (INPATIENT)
Dept: HOSPITAL 75 - ER | Age: 67
LOS: 5 days | Discharge: NURSING, REHAB OR LONG-TERM CARE | DRG: 683 | End: 2017-07-11
Attending: FAMILY MEDICINE | Admitting: FAMILY MEDICINE
Payer: MEDICARE

## 2017-07-06 VITALS — DIASTOLIC BLOOD PRESSURE: 69 MMHG | SYSTOLIC BLOOD PRESSURE: 136 MMHG

## 2017-07-06 VITALS — WEIGHT: 180 LBS | HEIGHT: 61 IN | BODY MASS INDEX: 33.99 KG/M2

## 2017-07-06 VITALS — SYSTOLIC BLOOD PRESSURE: 137 MMHG | DIASTOLIC BLOOD PRESSURE: 73 MMHG

## 2017-07-06 DIAGNOSIS — K59.00: ICD-10-CM

## 2017-07-06 DIAGNOSIS — Z79.84: ICD-10-CM

## 2017-07-06 DIAGNOSIS — Z86.73: ICD-10-CM

## 2017-07-06 DIAGNOSIS — R26.81: ICD-10-CM

## 2017-07-06 DIAGNOSIS — J44.9: ICD-10-CM

## 2017-07-06 DIAGNOSIS — E83.42: ICD-10-CM

## 2017-07-06 DIAGNOSIS — G47.10: ICD-10-CM

## 2017-07-06 DIAGNOSIS — L89.150: ICD-10-CM

## 2017-07-06 DIAGNOSIS — R32: ICD-10-CM

## 2017-07-06 DIAGNOSIS — I25.10: ICD-10-CM

## 2017-07-06 DIAGNOSIS — B96.20: ICD-10-CM

## 2017-07-06 DIAGNOSIS — I10: ICD-10-CM

## 2017-07-06 DIAGNOSIS — E78.00: ICD-10-CM

## 2017-07-06 DIAGNOSIS — F32.9: ICD-10-CM

## 2017-07-06 DIAGNOSIS — L89.621: ICD-10-CM

## 2017-07-06 DIAGNOSIS — R53.1: ICD-10-CM

## 2017-07-06 DIAGNOSIS — N39.0: ICD-10-CM

## 2017-07-06 DIAGNOSIS — L89.612: ICD-10-CM

## 2017-07-06 DIAGNOSIS — S00.83XA: ICD-10-CM

## 2017-07-06 DIAGNOSIS — Z79.02: ICD-10-CM

## 2017-07-06 DIAGNOSIS — E11.65: ICD-10-CM

## 2017-07-06 DIAGNOSIS — F17.210: ICD-10-CM

## 2017-07-06 DIAGNOSIS — M54.2: ICD-10-CM
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
## 2017-07-06 DIAGNOSIS — N17.9: Primary | ICD-10-CM

## 2017-07-06 DIAGNOSIS — D64.9: ICD-10-CM

## 2017-07-06 DIAGNOSIS — M25.512: ICD-10-CM

## 2017-07-06 LAB
ALBUMIN SERPL-MCNC: 3 GM/DL (ref 3.2–4.5)
ALT SERPL-CCNC: 8 U/L (ref 0–55)
ANION GAP SERPL CALC-SCNC: 7 MMOL/L (ref 5–14)
APTT BLD: 26 SEC (ref 24–35)
AST SERPL-CCNC: 16 U/L (ref 5–34)
BASOPHILS # BLD AUTO: 0 10^3/UL (ref 0–0.1)
BASOPHILS NFR BLD AUTO: 0 % (ref 0–10)
BILIRUB SERPL-MCNC: 0.2 MG/DL (ref 0.1–1)
BILIRUB UR QL STRIP: NEGATIVE
BUN SERPL-MCNC: 28 MG/DL (ref 7–18)
BUN/CREAT SERPL: 12
CALCIUM SERPL-MCNC: 8.3 MG/DL (ref 8.5–10.1)
CHLORIDE SERPL-SCNC: 110 MMOL/L (ref 98–107)
CO2 SERPL-SCNC: 18 MMOL/L (ref 21–32)
CREAT SERPL-MCNC: 2.37 MG/DL (ref 0.6–1.3)
EOSINOPHIL # BLD AUTO: 0.1 10^3/UL (ref 0–0.3)
EOSINOPHIL NFR BLD AUTO: 1 % (ref 0–10)
ERYTHROCYTE [DISTWIDTH] IN BLOOD BY AUTOMATED COUNT: 18.4 % (ref 10–14.5)
ETHANOL SERPL-MCNC: < 10 MG/DL (ref ?–10)
GFR SERPLBLD BASED ON 1.73 SQ M-ARVRAT: 20 ML/MIN
GLUCOSE SERPL-MCNC: 317 MG/DL (ref 70–105)
INR PPP: 1.4 (ref 0.8–1.4)
KETONES UR QL STRIP: NEGATIVE
LEUKOCYTE ESTERASE UR QL STRIP: (no result)
LYMPHOCYTES # BLD AUTO: 1.7 X 10^3 (ref 1–4)
LYMPHOCYTES NFR BLD AUTO: 13 % (ref 12–44)
MAGNESIUM SERPL-MCNC: 2 MG/DL (ref 1.8–2.4)
MCH RBC QN AUTO: 27 PG (ref 25–34)
MCHC RBC AUTO-ENTMCNC: 33 G/DL (ref 32–36)
MCV RBC AUTO: 81 FL (ref 80–99)
MONOCYTES # BLD AUTO: 0.8 X 10^3 (ref 0–1)
MONOCYTES NFR BLD AUTO: 6 % (ref 0–12)
NEUTROPHILS # BLD AUTO: 10.5 X 10^3 (ref 1.8–7.8)
NEUTROPHILS NFR BLD AUTO: 81 % (ref 42–75)
NITRITE UR QL STRIP: POSITIVE
PH UR STRIP: 5 [PH] (ref 5–9)
PLATELET # BLD: 407 10^3/UL (ref 130–400)
PMV BLD AUTO: 11.1 FL (ref 7.4–10.4)
POTASSIUM SERPL-SCNC: 4.1 MMOL/L (ref 3.6–5)
PROT SERPL-MCNC: 6.3 GM/DL (ref 6.4–8.2)
PROT UR QL STRIP: (no result)
PROTHROMBIN TIME: 17.1 SEC (ref 12.2–14.7)
RBC # BLD AUTO: 3.77 10^6/UL (ref 4.35–5.85)
SODIUM SERPL-SCNC: 135 MMOL/L (ref 135–145)
SP GR UR STRIP: 1.02 (ref 1.02–1.02)
SQUAMOUS #/AREA URNS HPF: (no result) /HPF
UROBILINOGEN UR-MCNC: NORMAL MG/DL
WBC # BLD AUTO: 13 10^3/UL (ref 4.3–11)
WBC #/AREA URNS HPF: (no result) /HPF

## 2017-07-06 PROCEDURE — 76937 US GUIDE VASCULAR ACCESS: CPT

## 2017-07-06 PROCEDURE — 51701 INSERT BLADDER CATHETER: CPT

## 2017-07-06 PROCEDURE — 85027 COMPLETE CBC AUTOMATED: CPT

## 2017-07-06 PROCEDURE — 70450 CT HEAD/BRAIN W/O DYE: CPT

## 2017-07-06 PROCEDURE — 83735 ASSAY OF MAGNESIUM: CPT

## 2017-07-06 PROCEDURE — 93041 RHYTHM ECG TRACING: CPT

## 2017-07-06 PROCEDURE — 81000 URINALYSIS NONAUTO W/SCOPE: CPT

## 2017-07-06 PROCEDURE — 71010: CPT

## 2017-07-06 PROCEDURE — 87186 SC STD MICRODIL/AGAR DIL: CPT

## 2017-07-06 PROCEDURE — 87324 CLOSTRIDIUM AG IA: CPT

## 2017-07-06 PROCEDURE — 87077 CULTURE AEROBIC IDENTIFY: CPT

## 2017-07-06 PROCEDURE — 80320 DRUG SCREEN QUANTALCOHOLS: CPT

## 2017-07-06 PROCEDURE — 85610 PROTHROMBIN TIME: CPT

## 2017-07-06 PROCEDURE — 87088 URINE BACTERIA CULTURE: CPT

## 2017-07-06 PROCEDURE — 80053 COMPREHEN METABOLIC PANEL: CPT

## 2017-07-06 PROCEDURE — 36415 COLL VENOUS BLD VENIPUNCTURE: CPT

## 2017-07-06 PROCEDURE — 80306 DRUG TEST PRSMV INSTRMNT: CPT

## 2017-07-06 PROCEDURE — 96361 HYDRATE IV INFUSION ADD-ON: CPT

## 2017-07-06 PROCEDURE — 87449 NOS EACH ORGANISM AG IA: CPT

## 2017-07-06 PROCEDURE — 80048 BASIC METABOLIC PNL TOTAL CA: CPT

## 2017-07-06 PROCEDURE — 85025 COMPLETE CBC W/AUTO DIFF WBC: CPT

## 2017-07-06 PROCEDURE — 72125 CT NECK SPINE W/O DYE: CPT

## 2017-07-06 PROCEDURE — 94760 N-INVAS EAR/PLS OXIMETRY 1: CPT

## 2017-07-06 PROCEDURE — 82962 GLUCOSE BLOOD TEST: CPT

## 2017-07-06 PROCEDURE — 96374 THER/PROPH/DIAG INJ IV PUSH: CPT

## 2017-07-06 PROCEDURE — 85730 THROMBOPLASTIN TIME PARTIAL: CPT

## 2017-07-06 RX ADMIN — INSULIN HUMAN SCH UNIT: 100 INJECTION, SOLUTION PARENTERAL at 20:01

## 2017-07-06 RX ADMIN — SODIUM CHLORIDE SCH MLS/HR: 900 INJECTION, SOLUTION INTRAVENOUS at 19:56

## 2017-07-06 NOTE — ED FALL/INJURY
General


Chief Complaint:  Trauma-Non Activation


Stated Complaint:  FALL/LEFT SHOULDER PAIN


Nursing Triage Note:  


TO ED PER EMS ASHVIN INDRA LEBRON DAVID WHO REPORT SHE WAS BENDING OVER IN HER W/C 

TO 


PICK SOMETHING UP AND FELL OUT NO LOC. ABRASION WITH HENMATOMA TO R SIDE OF 


FOREHEAD AND PAIN IN L SHOULDER.


Source:  patient, EMS


Exam Limitations:  no limitations





History of Present Illness


Time seen by provider:  14:00


Initial Comments


This 66-year-old woman presents to the emergency room from the nursing home 

after falling when she was bending over in her wheelchair to pick something up.

  There was no loss of consciousness.  She has an abrasion on her right 

forehead.  On my assessment she denies any significant pain.  She has 

increasing somnolence.  She is presently on Plavix.  Patient did not complain 

of any shoulder pain on my interview.  Fingerstick blood sugar was 335.


Occurred:  just prior to arrival


Severity:  mild


Injuries/Pain Location:  head


Loss of Consciousness:  no loss of consciousness





Allergies and Home Medications


Allergies


Coded Allergies:  


     morphine (Unverified  Allergy, Mild, 14)


 "ACTS WEIRD"


     Penicillins (Unverified  Allergy, Unknown, 08)





Home Medications


Acetaminophen 650 Mg Tablet.er, 650 MG PO Q4H PRN for PAIN-MILD, (Reported)


   NOT TO EXCEED 4 GRAMS PER DAY  


Albuterol Sulfate 18 Gm Hfa.aer.ad, 2 PUFF IH Q8H PRN for SHORTNESS OF BREATH, (

Reported)


Amlodipine Besylate 5 Mg Tablet, 5 MG PO DAILY, (Reported)


Atorvastatin Calcium 40 Mg Tablet, 40 MG PO HS, (Reported)


Clonazepam 0.5 Mg Tablet, 0.5 MG PO Q12H PRN for ANXIETY, (Reported)


Clopidogrel Bisulfate 75 Mg Tablet, 75 MG PO DAILY, (Reported)


Docusate Sodium 100 Mg Capsule, 100 MG PO Q12H PRN for CONSTIPATION-1ST LINE, (

Reported)


Escitalopram Oxalate 10 Mg Tablet, 10 MG PO DAILY, (Reported)


Famotidine 20 Mg Tablet, 20 MG PO BID, (Reported)


Guaifenesin 100 Mg/5 Ml Liquid, 10 ML PO Q4H PRN for COUGH, (Reported)


Magnesium Hydroxide 400 Mg/5 Ml Oral.susp, 30 ML PO Q12H PRN for CONSTIPATION-

7TH LINE, (Reported)


Magnesium Oxide 400 Mg Tablet, 400 MG PO BIDPC, #30


   Prescribed by: TELLO ELLIOTT on 17 1326


Menthol/Camphor 56 Gm Cream..g., TP Q6H PRN for JOINT PAIN, (Reported)


Metformin HCl 500 Mg Tablet, 500 MG PO BID, (Reported)


Metronidazole 500 Mg Tablet, 500 MG PO TID for 12 Days, #36


   Prescribed by: TELLO ELLIOTT on 17 1326


Ondansetron HCl 4 Mg Tablet, 4 MG PO Q6H PRN for NAUSEA/VOMITING-1ST LINE, (

Reported)


Potassium Chloride 10 Meq Tablet.er, 10 MEQ PO BID WITH MEALS, #30


   Prescribed by: TELLO ELLIOTT on 17 1326


Quinapril HCl 20 Mg Tablet, 20 MG PO DAILY, (Reported)


Tramadol HCl 50 Mg Tablet, 50 MG PO Q12H PRN for PAIN-MODERATE, (Reported)





Constitutional:  see HPI


Eyes:  No Symptoms Reported


Ears, Nose, Mouth, Throat:  no symptoms reported


Respiratory:  no symptoms reported


Cardiovascular:  no symptoms reported


Gastrointestinal:  no symptoms reported


Genitourinary:  no symptoms reported


Musculoskeletal:  see HPI


Skin:  see HPI


Psychiatric/Neurological:  See HPI





Past Medical-Social-Family Hx


Patient Social History


Alcohol Use:  Denies Use


Recreational Drug Use:  No


Smoking Status:  Current Everyday Smoker


Type Used:  Cigarettes


2nd Hand Smoke Exposure:  No


Recent Foreign Travel:  No


Contact w/Someone Who Travel:  No


Recent Infectious Disease Expo:  No


Recent Hopitalizations:  No





Immunizations Up To Date


Tetanus Booster (TDap):  Unknown





Seasonal Allergies


Seasonal Allergies:  No





Surgeries


HX Surgeries:  Yes (CATARACTS)


Surgeries:   Section, Eye Surgery, Gallbladder





Respiratory


Hx Respiratory Disorders:  Yes


Respiratory Disorders:  COPD





Cardiovascular


Hx Cardiac Disorders:  Yes


Cardiac Disorders:  Coronary Artery Disease, High Cholesterol, Hypertension





Neurological


Hx Neurological Disorders:  Yes


Neurological Disorders:  Stroke





Reproductive System


Hx Reproductive Disorders:  No





Genitourinary


Hx Genitourinary Disorders:  Yes (INCONTINENT)





Gastrointestinal


Hx Gastrointestinal Disorders:  Yes


Gastrointestinal Disorders:  Chronic Constipation





Musculoskeletal


Hx Musculoskeletal Disorders:  Yes (CHRONIC NECK AND BACK PAIN ; UNSTEADY GAIT 

AND GENERALIZED WEAKNESS)


Musculoskeletal Disorders:  Arthritis, Chronic Back Pain





Endocrine


Hx Endocrine Disorders:  Yes


Endocrine Disorders:  Diabetes, Non-Insulin dep





Cancer


Hx Cancer:  No





Psychosocial


Hx Psychiatric Problems:  Yes


Behavioral Health Disorders:  Depression





Integumentary


HX Skin/Integumentary Disorder:  No





Blood Transfusions


Hx Blood Disorders:  No


Adverse Reaction to a Blood Tr:  No





Family Medical History


Significant Family History:  No Pertinent Family Hx





Physical Exam


Vital Signs





Vital Sign - Last 12Hours








 17





 13:19


 


Temp 97.6


 


Pulse 104


 


Resp 18


 


B/P (MAP) 119/83


 


Pulse Ox 98


 


O2 Delivery Room Air





Capillary Refill : Less Than 3 Seconds


General Appearance:  WD/WN, no apparent distress, other (hypersomnolent)


HEENT:  PERRL/EOMI, TMs normal, pharynx normal, other (contusion/ecchymosis on 

the right forehead)


Neck:  non-tender, supple, normal inspection


Cardiovascular:  regular rate, rhythm, no edema, no murmur


Respiratory:  lungs clear, normal breath sounds, no respiratory distress, no 

accessory muscle use


Gastrointestinal:  normal bowel sounds, non tender, soft


Extremities:  non-tender, normal inspection, no pedal edema


Neurologic/Psychiatric:  CNs II-XII nml as tested, no motor/sensory deficits, 

oriented x 3, other (patient is very somnolent but arousable.  She is actually 

alert and oriented 3 when pressed for answers.)





Bryan Coma Score


Best Eye Response:  (3) Open to Voice


Best Verbal Response:  (5) Oriented


Best Motor Response:  (6) Obeys Commands


Bryan Total:  14





Progress/Results/Core Measures


Results/Orders


Lab Results





Laboratory Tests








Test


  17


14:32 17


15:45 Range/Units


 


 


Glucometer 335 H    MG/DL


 


White Blood Count


  


  13.0 H


  4.3-11.0


10^3/uL


 


Red Blood Count


  


  3.77 L


  4.35-5.85


10^6/uL


 


Hemoglobin  10.1 L 11.5-16.0  G/DL


 


Hematocrit  31 L 35-52  %


 


Mean Corpuscular Volume  81  80-99  FL


 


Mean Corpuscular Hemoglobin  27  25-34  PG


 


Mean Corpuscular Hemoglobin


Concent 


  33 


  32-36  G/DL


 


 


Red Cell Distribution Width  18.4 H 10.0-14.5  %


 


Platelet Count


  


  407 H


  130-400


10^3/uL


 


Mean Platelet Volume  11.1 H 7.4-10.4  FL


 


Neutrophils (%) (Auto)  81 H 42-75  %


 


Lymphocytes (%) (Auto)  13  12-44  %


 


Monocytes (%) (Auto)  6  0-12  %


 


Eosinophils (%) (Auto)  1  0-10  %


 


Basophils (%) (Auto)  0  0-10  %


 


Neutrophils # (Auto)  10.5 H 1.8-7.8  X 10^3


 


Lymphocytes # (Auto)  1.7  1.0-4.0  X 10^3


 


Monocytes # (Auto)  0.8  0.0-1.0  X 10^3


 


Eosinophils # (Auto)


  


  0.1 


  0.0-0.3


10^3/uL


 


Basophils # (Auto)


  


  0.0 


  0.0-0.1


10^3/uL


 


Prothrombin Time  17.1 H 12.2-14.7  SEC


 


INR Comment  1.4  0.8-1.4  


 


Activated Partial


Thromboplast Time 


  26 


  24-35  SEC


 


 


Sodium Level  135  135-145  MMOL/L


 


Potassium Level  4.1  3.6-5.0  MMOL/L


 


Chloride Level  110 H   MMOL/L


 


Carbon Dioxide Level  18 L 21-32  MMOL/L


 


Anion Gap  7  5-14  MMOL/L


 


Blood Urea Nitrogen  28 H 7-18  MG/DL


 


Creatinine


  


  2.37 H


  0.60-1.30


MG/DL


 


Estimat Glomerular Filtration


Rate 


  20 


   


 


 


BUN/Creatinine Ratio  12   


 


Glucose Level  317 H   MG/DL


 


Calcium Level  8.3 L 8.5-10.1  MG/DL


 


Magnesium Level  2.0  1.8-2.4  MG/DL


 


Total Bilirubin  0.2  0.1-1.0  MG/DL


 


Aspartate Amino Transf


(AST/SGOT) 


  16 


  5-34  U/L


 


 


Alanine Aminotransferase


(ALT/SGPT) 


  8 


  0-55  U/L


 


 


Alkaline Phosphatase  58    U/L


 


Total Protein  6.3 L 6.4-8.2  GM/DL


 


Albumin  3.0 L 3.2-4.5  GM/DL


 


Serum Alcohol  < 10  <10  MG/DL








My Orders





Orders - DEBBIE JUAN MD


Alcohol (17 14:27)


Cbc With Automated Diff (17 14:27)


Comprehensive Metabolic Panel (17 14:27)


Drug Screen Stat (Urine) (17 14:27)


Magnesium (17 14:27)


Protime With Inr (17 14:27)


Partial Thromboplastin Time (17 14:27)


Ua Culture If Indicated (17 14:27)


Saline Lock/Iv-Start (17 14:27)


Monitor-Rhythm Ecg Trace Only (17 14:27)


Chest 1 View, Ap/Pa Only (17 14:27)


Ct Head/Cervical Spine Wo (17 14:27)


Saline Lock/Iv-Start (17 14:27)


Accucheck Stat ONCE (17 14:27)


Ns Iv 1000 Ml (Sodium Chloride 0.9%) (17 16:36)





Vital Signs/I&O





Vital Sign - Last 12Hours








 17





 13:19


 


Temp 97.6


 


Pulse 104


 


Resp 18


 


B/P (MAP) 119/83


 


Pulse Ox 98


 


O2 Delivery Room Air














Blood Pressure Mean:  95











Point of Care Testing


Finger Stick Blood Glucose:  335


Progress Note :  


Progress Note


CT of the head and C-spine was obtained along with lab work.  No acute injuries 

were identified.  Patient was somnolent around the time of physician assessment 

but gradually improved.  She was found to be in acute renal failure.  A liter 

of IV fluids was administered by EMS.  A second liter was administered in the 

ER.  Was reviewed with Dr. Moser.  He states patient has struggled with 

renal function in the outpatient setting.  He agrees with admission for IV 

hydration.  He reports patient does not keep up well with oral hydration and 

has actually received IV hydration in the outpatient setting recently.





Diagnostic Imaging





   Diagonstic Imaging:  CT


   Plain Films/CT/US/NM/MRI:  c-spine, head


Comments


CT head and C-spine viewed by me and report reviewed.  See report below:





NAME:   DESTINEY REYNOLDS  


[facility] REC#:   [id number]  


ACCOUNT#:   [id number]  


PT STATUS:   REG ER  


:   1950  


PHYSICIAN:   DEBBIE JUAN MD  


ADMIT DATE:   17/ER  


 ***Draft***  


Date of Exam:17  


 


CT HEAD/CERVICAL SPINE WO  


 


PROCEDURE: CT head and CT cervical spine without contrast.  


 


 TECHNIQUE: Multiple contiguous axial images were obtained through  


 the brain and cervical spine without the use of intravenous  


 contrast. Sagittal and coronal reformations through the cervical  


 spine were then performed.  


 


 INDICATION:  Fall. Bruising.  


 


 FINDINGS:  


 CT head:  


 There is no intracranial hemorrhage, edema, or mass effect. The  


 brain parenchyma demonstrates prominent periventricular and deep  


 white matter hypodensities compatible with chronic microvascular  


 ischemic changes. There is a 9 mm hypodense lesion within the  


 brainstem suggestive of an old lacunar infarct. Small other  


 lacunar infarcts in the right basal ganglia is also seen. No  


 hydrocephalus.  


 


 The calvarium, the visualized portions of the paranasal sinuses  


 and orbits appear grossly unremarkable.  


 


 CT cervical spine:  


 There is a straightening of the cervical spine curvature. The  


 alignment of the posterior spinal line is satisfactory. There is  


 no compression fracture. There is mild disc height loss at C4-C5  


 and C5-C6 levels. The alignment at the facet joints is  


 satisfactory. There is a fusion of the right facet joint at the  


 C3-C4 level. No widening of the predental space. Satisfactory  


 alignment of the atlantooccipital joints and the lateral masses  


 of C1 and C2 is seen. Prominent anterior osteophytes at the mid  


 and lower cervical spine levels is noted.  


 


 There is a severe foraminal stenosis on the right side at the  


 C3-C4 level.  


 


 No fracture seen.  


 


 IMPRESSION: CT head: Findings compatible with chronic ischemic  


 changes. No intracranial hemorrhage.  


 


 CT cervical spine: Degenerative changes. No fracture seen.  


 


   Dictated on workstation # [id number]  


 


Dict:   17 1504  


Trans:   17 1516  


[facility] [id number]  


 


Interpreted by:     FRANK MENDOZA MD








   Diagonstic Imaging:  Xray


   Plain Films/CT/US/NM/MRI:  chest


Comments


Chest x-ray viewed by me and report reviewed.  See report below:





NAME:   DESTINEY REYNOLDS  


[facility] REC#:   [id number]  


ACCOUNT#:   [id number]  


PT STATUS:   REG ER  


:   1950  


PHYSICIAN:   DEBBIE JUAN MD  


ADMIT DATE:   17/ER  


 ***Signed***  


Date of Exam:17  


 


CHEST 1 VIEW, AP/PA ONLY  


 


EXAMINATION: Portable upright radiograph of the chest.  


 


 INDICATION: Fall.  


 


 FINDINGS: The lungs are clear. The heart size is at the upper  


 limits of normal.  


 


 No effusion or pneumothorax.  


 


 The mediastinum and salty appear unremarkable.  


 


 IMPRESSION: Prominent cardiac size. No focal infiltrate.  


 


 Dictated by:   


 


   Dictated on workstation # [id number]  


 


Dict:   17 1459  


Trans:   17 1511  


[facility] [id number]  


 


Interpreted by:     FRANK MENDOZA MD  


Electronically signed by: FRANK MENDOZA MD 17 1511





Departure


Communication


Time/Spoke to Admitting Phy:  16:50


Communication


Dr. Moser





Impression


Impression:  


 Primary Impression:  


 Acute renal failure


 Qualified Codes:  N17.9 - Acute kidney failure, unspecified


 Additional Impressions:  


 Fall on same level


 Qualified Codes:  W18.30XA - Fall on same level, unspecified, initial encounter


 Altered mental status


 Qualified Codes:  R41.82 - Altered mental status, unspecified


Disposition:   ADMITTED AS INPATIENT


Condition:  Improved


Decision to Admit Reason:  Admit from ER (General)


Decision to Admit/Date:  2017


Time/Decision to Admit Time:  16:50





Departure-Patient Inst.


Referrals:  


RODY MOSER DO (PCP/Family)


Primary Care Physician











DEBBIE JUAN MD 2017 15:25

## 2017-07-06 NOTE — DIAGNOSTIC IMAGING REPORT
EXAMINATION: Portable upright radiograph of the chest.



INDICATION: Fall.



FINDINGS: The lungs are clear. The heart size is at the upper

limits of normal.



No effusion or pneumothorax.



The mediastinum and salty appear unremarkable.



IMPRESSION: Prominent cardiac size. No focal infiltrate.



Dictated by: 



  Dictated on workstation # [id number]

## 2017-07-06 NOTE — DIAGNOSTIC IMAGING REPORT
PROCEDURE: CT head and CT cervical spine without contrast.



TECHNIQUE: Multiple contiguous axial images were obtained through

the brain and cervical spine without the use of intravenous

contrast. Sagittal and coronal reformations through the cervical

spine were then performed.



INDICATION:  Fall. Bruising.



FINDINGS:

CT head:

There is no intracranial hemorrhage, edema, or mass effect. The

brain parenchyma demonstrates prominent periventricular and deep

white matter hypodensities compatible with chronic microvascular

ischemic changes. There is a 9 mm hypodense lesion within the

brainstem suggestive of an old lacunar infarct. Small other

lacunar infarcts in the right basal ganglia is also seen. No

hydrocephalus.



The calvarium, the visualized portions of the paranasal sinuses

and orbits appear grossly unremarkable.



CT cervical spine:

There is a straightening of the cervical spine curvature. The

alignment of the posterior spinal line is satisfactory. There is

no compression fracture. There is mild disc height loss at C4-C5

and C5-C6 levels. The alignment at the facet joints is

satisfactory. There is a fusion of the right facet joint at the

C3-C4 level. No widening of the predental space. Satisfactory

alignment of the atlantooccipital joints and the lateral masses

of C1 and C2 is seen. Prominent anterior osteophytes at the mid

and lower cervical spine levels is noted.



There is a severe foraminal stenosis on the right side at the

C3-C4 level.



No fracture seen.



IMPRESSION: CT head: Findings compatible with chronic ischemic

changes. No intracranial hemorrhage.



CT cervical spine: Degenerative changes. No fracture seen.



Dictated by: 



  Dictated on workstation # [id number]

## 2017-07-07 VITALS — DIASTOLIC BLOOD PRESSURE: 61 MMHG | SYSTOLIC BLOOD PRESSURE: 121 MMHG

## 2017-07-07 VITALS — SYSTOLIC BLOOD PRESSURE: 124 MMHG | DIASTOLIC BLOOD PRESSURE: 57 MMHG

## 2017-07-07 VITALS — DIASTOLIC BLOOD PRESSURE: 71 MMHG | SYSTOLIC BLOOD PRESSURE: 136 MMHG

## 2017-07-07 VITALS — DIASTOLIC BLOOD PRESSURE: 74 MMHG | SYSTOLIC BLOOD PRESSURE: 123 MMHG

## 2017-07-07 VITALS — DIASTOLIC BLOOD PRESSURE: 63 MMHG | SYSTOLIC BLOOD PRESSURE: 115 MMHG

## 2017-07-07 VITALS — SYSTOLIC BLOOD PRESSURE: 114 MMHG | DIASTOLIC BLOOD PRESSURE: 58 MMHG

## 2017-07-07 LAB
ALBUMIN SERPL-MCNC: 3.2 GM/DL (ref 3.2–4.5)
ALT SERPL-CCNC: 10 U/L (ref 0–55)
ANION GAP SERPL CALC-SCNC: 8 MMOL/L (ref 5–14)
AST SERPL-CCNC: 10 U/L (ref 5–34)
BASOPHILS # BLD AUTO: 0 10^3/UL (ref 0–0.1)
BASOPHILS NFR BLD AUTO: 0 % (ref 0–10)
BILIRUB SERPL-MCNC: 0.4 MG/DL (ref 0.1–1)
BUN SERPL-MCNC: 28 MG/DL (ref 7–18)
BUN/CREAT SERPL: 13
CALCIUM SERPL-MCNC: 8.7 MG/DL (ref 8.5–10.1)
CHLORIDE SERPL-SCNC: 113 MMOL/L (ref 98–107)
CO2 SERPL-SCNC: 17 MMOL/L (ref 21–32)
CREAT SERPL-MCNC: 2.19 MG/DL (ref 0.6–1.3)
EOSINOPHIL # BLD AUTO: 0.1 10^3/UL (ref 0–0.3)
EOSINOPHIL NFR BLD AUTO: 1 % (ref 0–10)
ERYTHROCYTE [DISTWIDTH] IN BLOOD BY AUTOMATED COUNT: 18.6 % (ref 10–14.5)
GFR SERPLBLD BASED ON 1.73 SQ M-ARVRAT: 22 ML/MIN
GLUCOSE SERPL-MCNC: 155 MG/DL (ref 70–105)
LYMPHOCYTES # BLD AUTO: 1.6 X 10^3 (ref 1–4)
LYMPHOCYTES NFR BLD AUTO: 16 % (ref 12–44)
MCH RBC QN AUTO: 26 PG (ref 25–34)
MCHC RBC AUTO-ENTMCNC: 32 G/DL (ref 32–36)
MCV RBC AUTO: 82 FL (ref 80–99)
MONOCYTES # BLD AUTO: 0.6 X 10^3 (ref 0–1)
MONOCYTES NFR BLD AUTO: 6 % (ref 0–12)
NEUTROPHILS # BLD AUTO: 7.9 X 10^3 (ref 1.8–7.8)
NEUTROPHILS NFR BLD AUTO: 77 % (ref 42–75)
PLATELET # BLD: 422 10^3/UL (ref 130–400)
PMV BLD AUTO: 10.6 FL (ref 7.4–10.4)
POTASSIUM SERPL-SCNC: 4 MMOL/L (ref 3.6–5)
PROT SERPL-MCNC: 6.4 GM/DL (ref 6.4–8.2)
RBC # BLD AUTO: 3.55 10^6/UL (ref 4.35–5.85)
SODIUM SERPL-SCNC: 138 MMOL/L (ref 135–145)
WBC # BLD AUTO: 10.2 10^3/UL (ref 4.3–11)

## 2017-07-07 RX ADMIN — INSULIN HUMAN SCH UNIT: 100 INJECTION, SOLUTION PARENTERAL at 17:16

## 2017-07-07 RX ADMIN — ANORECTAL OINTMENT SCH GM: 15.7; .44; 24; 20.6 OINTMENT TOPICAL at 09:36

## 2017-07-07 RX ADMIN — POTASSIUM CHLORIDE SCH MEQ: 750 TABLET, FILM COATED, EXTENDED RELEASE ORAL at 21:13

## 2017-07-07 RX ADMIN — TERBINAFINE HYDROCHLORIDE SCH GM: 1 CREAM TOPICAL at 21:13

## 2017-07-07 RX ADMIN — SODIUM CHLORIDE SCH MLS/HR: 900 INJECTION INTRAVENOUS at 05:06

## 2017-07-07 RX ADMIN — SODIUM CHLORIDE SCH MLS/HR: 900 INJECTION, SOLUTION INTRAVENOUS at 18:35

## 2017-07-07 RX ADMIN — TERBINAFINE HYDROCHLORIDE SCH GM: 1 CREAM TOPICAL at 13:28

## 2017-07-07 RX ADMIN — SODIUM CHLORIDE SCH MLS/HR: 900 INJECTION INTRAVENOUS at 18:35

## 2017-07-07 RX ADMIN — INSULIN HUMAN SCH UNIT: 100 INJECTION, SOLUTION PARENTERAL at 21:14

## 2017-07-07 RX ADMIN — INSULIN HUMAN SCH UNIT: 100 INJECTION, SOLUTION PARENTERAL at 12:20

## 2017-07-07 RX ADMIN — NYSTATIN SCH ML: 100000 SUSPENSION ORAL at 21:13

## 2017-07-07 RX ADMIN — ATORVASTATIN CALCIUM SCH MG: 40 TABLET, FILM COATED ORAL at 21:13

## 2017-07-07 RX ADMIN — INSULIN HUMAN SCH UNIT: 100 INJECTION, SOLUTION PARENTERAL at 06:11

## 2017-07-07 RX ADMIN — SODIUM CHLORIDE SCH MLS/HR: 900 INJECTION, SOLUTION INTRAVENOUS at 09:40

## 2017-07-07 RX ADMIN — ANORECTAL OINTMENT SCH GM: 15.7; .44; 24; 20.6 OINTMENT TOPICAL at 21:13

## 2017-07-07 RX ADMIN — SODIUM CHLORIDE SCH MLS/HR: 900 INJECTION, SOLUTION INTRAVENOUS at 04:57

## 2017-07-07 RX ADMIN — Medication SCH MG: at 21:13

## 2017-07-07 RX ADMIN — TERBINAFINE HYDROCHLORIDE SCH GM: 1 CREAM TOPICAL at 09:36

## 2017-07-07 NOTE — HISTORY & PHYSICIAL
History of Present Illness


History of Present Illness


Reason for visit/HPI


patient resident of  a nursing home.


Patient in wheelchair and bent over and fell on her head.


Patient brought out to the emergency room.


Patient has acute renal failure and  infection.


Patient has history of diabetes, CAD, and COPD


Date of Admission


2017 at 17:30


Time Seen by Provider:  07:50


I consulted on this patient on


17


 08:00


Attending Physician


Margarito Moser DO


Admitting Physician


Margarito Moser DO


Consult








Allergies and Home Medications


Allergies


Coded Allergies:  


     morphine (Unverified  Allergy, Mild, 14)


 "ACTS WEIRD"


     Penicillins (Unverified  Allergy, Unknown, 08)





Home Medications


Acetaminophen 650 Mg Tablet.er, 650 MG PO Q4H PRN for PAIN-MILD, (Reported)


   NOT TO EXCEED 4 GRAMS PER DAY  


Albuterol Sulfate 18 Gm Hfa.aer.ad, 2 PUFF IH Q8H PRN for SHORTNESS OF BREATH, (

Reported)


Amlodipine Besylate 5 Mg Tablet, 5 MG PO DAILY, (Reported)


Atorvastatin Calcium 40 Mg Tablet, 40 MG PO HS, (Reported)


Clonazepam 0.5 Mg Tablet, 0.5 MG PO Q12H PRN for ANXIETY, (Reported)


Clopidogrel Bisulfate 75 Mg Tablet, 75 MG PO DAILY, (Reported)


Docusate Sodium 100 Mg Capsule, 100 MG PO Q12H PRN for CONSTIPATION-1ST LINE, (

Reported)


Escitalopram Oxalate 10 Mg Tablet, 10 MG PO DAILY, (Reported)


Famotidine 20 Mg Tablet, 20 MG PO BID, (Reported)


Guaifenesin 100 Mg/5 Ml Liquid, 10 ML PO Q4H PRN for COUGH, (Reported)


Magnesium Hydroxide 400 Mg/5 Ml Oral.susp, 30 ML PO Q12H PRN for CONSTIPATION-

7TH LINE, (Reported)


Magnesium Oxide 400 Mg Tablet, 400 MG PO BIDPC, #30


   Prescribed by: TELLO ELLIOTT on 17 1326


Menthol/Camphor 56 Gm Cream..g., TP Q6H PRN for JOINT PAIN, (Reported)


Metformin HCl 500 Mg Tablet, 500 MG PO BID, (Reported)


Metronidazole 500 Mg Tablet, 500 MG PO TID for 12 Days, #36


   Prescribed by: TELLO ELLIOTT on 17 1326


Ondansetron HCl 4 Mg Tablet, 4 MG PO Q6H PRN for NAUSEA/VOMITING-1ST LINE, (

Reported)


Potassium Chloride 10 Meq Tablet.er, 10 MEQ PO BID WITH MEALS, #30


   Prescribed by: TELLO ELLIOTT on 17 1326


Quinapril HCl 20 Mg Tablet, 20 MG PO DAILY, (Reported)


Tramadol HCl 50 Mg Tablet, 50 MG PO Q12H PRN for PAIN-MODERATE, (Reported)





Past Medical-Social-Family Hx


Patient Social History


Marrital Status:  


Alcohol Use:  Denies Use


Recreational Drug Use:  No


Smoking Status:  Current Everyday Smoker


Type Used:  Cigarettes


2nd Hand Smoke Exposure:  No


Physical Abuse Screen:  No


Sexual Abuse:  No


Recent Foreign Travel:  No


Contact w/other who traveled:  No


Recent Hopitalizations:  No


Recent Infectious Disease Expo:  No





Immunizations Up To Date


Tetanus Booster (TDap):  Unknown





Seasonal Allergies


Seasonal Allergies:  No





Surgeries


HX Surgeries:  Yes (CATARACTS)


Surgeries:   Section, Eye Surgery, Gallbladder





Respiratory


Hx Respiratory Disorders:  Yes


Respiratory Disorders:  COPD





Cardiovascular


Hx Cardiovascular Disorders:  Yes


Cardiac Disorders:  Coronary Artery Disease, High Cholesterol, Hypertension





Neurological


Hx Neurological Disorders:  Yes


Neurological Disorders:  Stroke





Reproductive System


Hx Reproductive Disorders:  No


GYN Hx:  Menopausal





Genitourinary


Hx Genitourinary Disorders:  Yes (INCONTINENT)





Gastrointestinal


Hx Gastrointestinal Disorders:  Yes


Gastrointestinal Disorders:  Chronic Constipation





Musculoskeletal


Hx Musculoskeletal Disorders:  Yes (CHRONIC NECK AND BACK PAIN ; UNSTEADY GAIT 

AND GENERALIZED WEAKNESS)


Musculoskeletal Disorders:  Arthritis, Chronic Back Pain





Endocrine


Hx Endocrine Disorders:  Yes


Endocrine Disorders:  Diabetes, Non-Insulin dep





Cancer


Hx Cancer:  No





Psychosocial


Hx Psychiatric Problems:  Yes


Behavioral Health Disorders:  Depression





Integumentary


HX Skin/Integumentary Disorder:  No





Blood Transfusions


Hx Blood Disorders:  No


Adverse Reaction to a Blood Tr:  No





Family Medical History


Significant Family History:  No Pertinent Family Hx





Constitutional:  weakness


EENTM:  no symptoms reported


Respiratory:  no symptoms reported


Cardiovascular:  no symptoms reported


Gastrointestinal:  no symptoms reported


Genitourinary:  no symptoms reported





Physical Exam


Vital Signs





Vital Sign - Last 12Hours








 17





 13:19


 


Temp 97.6


 


Pulse 104


 


Resp 18


 


B/P (MAP) 119/83


 


Pulse Ox 98


 


O2 Delivery Room Air





Capillary Refill : Less Than 3 Seconds


General Appearance:  No Apparent Distress, WD/WN


Eyes:  Bilateral Eye Normal Inspection


HEENT:  Normal ENT Inspection


Neck:  Full Range of Motion, Normal Inspection


Respiratory:  Chest Non Tender, No Accessory Muscle Use, No Respiratory Distress

, Decreased Breath Sounds


Cardiovascular:  Regular Rate, Rhythm, No Murmur


Gastrointestinal:  Non Tender, Soft





Assessment/Plan


Assessment and Plan


acute renal failure.


You infection.


COPD.


Diabetes.


Coronary  artery disease.


CAD


Problems:  





Clinical Quality Measures


DVT/VTE Risk/Contraindication:


Risk Factor Score Per Nursin


RFS Level Per Nursing on Admit:  4+=Very High











MARGARITO MOSER DO 2017 08:06

## 2017-07-07 NOTE — PHYSICAL THERAPY EVALUATION
PT Evaluation-General


Medical Diagnosis


Admission Date


2017 at 17:30


Medical Diagnosis:  acute renal failure, fall


Onset Date:  2017





Therapy Diagnosis


Therapy Diagnosis:  impaired mobility, strength, endurance





Height/Weight


Height (Feet):  5


Height (Inches):  1.00


Weight (Pounds):  180


Weight (Ounces):  0.0





Precautions


Precautions/Isolations:  Fall Prevention, Standard Precautions





Referral


Physician:  Margarito Covington DO


Reason for Referral:  Evaluation/Treatment





Medical History


Pertinent Medical History:  Arthritis, CAD, COPD, CVA, DM, HTN, Smoking


Additional Medical History


high cholesterol, incontinent, chronic constipation, chronic neck and back pain

, surg (cataracts, , gallbladder)


Current History


went to ER after a fall, fell out of wheelchair in the nursing home, has acute 

renal failure and  infection


Reviewed History:  Yes





Social History


Home:  Nursing Home


Patient states she has not been walking for quite a while but is unsure of how 

long





Prior/Core FIM


Prior Level of Function


              Functional [place] Measure


0=Not Assessed/NA   4=Minimal Assistance


1=Total Assistance   5=Supervision or Setup


2=Maximal Assistance   6=Modified [place]


3=Moderate Assistance   7=Complete [place]


unknown, patient is a poor historian





PT Evaluation-Current


Subjective


Patient in recliner pre tx, agrees to PT, has no complaints other than her low 

back pain which is chronic





Pt/Family Goals


"to walk again"





Objective


Patient Orientation:  Person, Confused


Attachments:  IV





ROM/Strength


ROM Lower Extremities


WNL


Strenght Lower Extremities


right lower extremity (hip flexion 2/5, knee flexion 2/5, knee extension 2/5, 

dorsiflexion 0/5), left lower extremity (hip flexion 3-/5, knee flexion 3-/5, 

knee extension 2/5, dorsiflexion 0/5)





Neuromuscular


(Tone, Coordination, Reflexes)


poor general muscle tone





Sensory


Vision:  Functional


Hearing:  Functional


Sensation Right Lower Extremit:  Impaired


Sensation Left Lower Extremity:  Intact


Sensation Lower Extremities


Patient has numbness in her right foot





Transfers


              Functional [place] Measure


0=Not Assessed/NA   4=Minimal Assistance


1=Total Assistance   5=Supervision or Setup


2=Maximal Assistance   6=Modified [place]


3=Moderate Assistance   7=Complete [place]


Transfers (B, C, W/C) (FIM):  2


Sit to/from Stand:  2


cues for positioning and safety





Balance


Sitting Static:  Good


Sitting Dynamic:  Good


Standing Static:  Poor


 Standing Dynamic:  Poor





Treatment


Patient stood twice from a chair with max assist, once using a walker and the 

second time with a therapist in front so nursing could take care of her bottom.

  Patient stated she would like to continue to stay in the recliner due to her 

back pain.





Assessment/Needs


Patient has impaired mobility, strength, endurance.  Confused, high fall risk.


Rehab Potential:  Poor





PT Long Term Goals


Long Term Goals


PT Long Term Goals Time Frame:  2017


Transfers (B,C,W/C) (FIM):  3


Gait (FIM):  1


Distance:  10'


Gait Level of Assist:  4


Gait Assistive Device:  FWW





PT Plan


Problem List


Problem List:  Activity Tolerance, Functional Strength, Safety, Balance, Gait, 

Transfer, Bed Mobility, ROM





Treatment/Plan


Treatment Plan:  Continue Plan of Care


Treatment Plan:  Bed Mobility, Education, Functional Activity [place], Functional 

Strength, Gait, Safety, Therapeutic Exercise, Transfers


Treatment Duration:  2017


Frequency:  Daily


Estimated Hrs Per Day:  .5 hour per day


Patient and/or Family Agrees t:  Yes


hours per day PRN





Safety Risks/Education


Patient Education:  Transfer Techniques, Correct Positioning, Safety Issues


Teaching Recipient:  Patient


Teaching Methods:  Demonstration, Discussion


Response to Teaching:  Reinforcement Needed





Discharge Recommendations


Plan


Patient will perform bed mobility and transfer training, balance and endurance 

training, functional strengthening, gait training, and education, to improve 

functional mobility and independence at home.


Therapy D/C Recommendations:  Skilled Nursing (TCU/NH)





Time/GCodes


Time In:  915


Time Out:  945


Total Billed Treatment Time:  30


Total Billed Treatment


1 visit


PETE 15'


GENEVIEVE 15'











ROCKY FIGUEROA PT 2017 09:49

## 2017-07-08 VITALS — DIASTOLIC BLOOD PRESSURE: 81 MMHG | SYSTOLIC BLOOD PRESSURE: 128 MMHG

## 2017-07-08 VITALS — DIASTOLIC BLOOD PRESSURE: 58 MMHG | SYSTOLIC BLOOD PRESSURE: 114 MMHG

## 2017-07-08 LAB
ALBUMIN SERPL-MCNC: 3.1 GM/DL (ref 3.2–4.5)
ALT SERPL-CCNC: 9 U/L (ref 0–55)
ANION GAP SERPL CALC-SCNC: 13 MMOL/L (ref 5–14)
AST SERPL-CCNC: 13 U/L (ref 5–34)
BASOPHILS # BLD AUTO: 0 10^3/UL (ref 0–0.1)
BASOPHILS NFR BLD AUTO: 0 % (ref 0–10)
BILIRUB SERPL-MCNC: 0.3 MG/DL (ref 0.1–1)
BUN SERPL-MCNC: 26 MG/DL (ref 7–18)
BUN/CREAT SERPL: 13
CALCIUM SERPL-MCNC: 8.6 MG/DL (ref 8.5–10.1)
CHLORIDE SERPL-SCNC: 116 MMOL/L (ref 98–107)
CO2 SERPL-SCNC: 11 MMOL/L (ref 21–32)
CREAT SERPL-MCNC: 2.02 MG/DL (ref 0.6–1.3)
EOSINOPHIL # BLD AUTO: 0.1 10^3/UL (ref 0–0.3)
EOSINOPHIL NFR BLD AUTO: 1 % (ref 0–10)
ERYTHROCYTE [DISTWIDTH] IN BLOOD BY AUTOMATED COUNT: 19.4 % (ref 10–14.5)
GFR SERPLBLD BASED ON 1.73 SQ M-ARVRAT: 24 ML/MIN
GLUCOSE SERPL-MCNC: 142 MG/DL (ref 70–105)
LYMPHOCYTES # BLD AUTO: 2.1 X 10^3 (ref 1–4)
LYMPHOCYTES NFR BLD AUTO: 16 % (ref 12–44)
MCH RBC QN AUTO: 27 PG (ref 25–34)
MCHC RBC AUTO-ENTMCNC: 32 G/DL (ref 32–36)
MCV RBC AUTO: 83 FL (ref 80–99)
MONOCYTES # BLD AUTO: 1 X 10^3 (ref 0–1)
MONOCYTES NFR BLD AUTO: 7 % (ref 0–12)
NEUTROPHILS # BLD AUTO: 10.3 X 10^3 (ref 1.8–7.8)
NEUTROPHILS NFR BLD AUTO: 76 % (ref 42–75)
PLATELET # BLD: 444 10^3/UL (ref 130–400)
PMV BLD AUTO: 10.9 FL (ref 7.4–10.4)
POTASSIUM SERPL-SCNC: 4.2 MMOL/L (ref 3.6–5)
PROT SERPL-MCNC: 6.4 GM/DL (ref 6.4–8.2)
RBC # BLD AUTO: 3.85 10^6/UL (ref 4.35–5.85)
SODIUM SERPL-SCNC: 140 MMOL/L (ref 135–145)
WBC # BLD AUTO: 13.5 10^3/UL (ref 4.3–11)

## 2017-07-08 RX ADMIN — FAMOTIDINE SCH MG: 20 TABLET, FILM COATED ORAL at 08:22

## 2017-07-08 RX ADMIN — SODIUM CHLORIDE SCH MLS/HR: 900 INJECTION INTRAVENOUS at 05:17

## 2017-07-08 RX ADMIN — Medication SCH MG: at 21:29

## 2017-07-08 RX ADMIN — Medication SCH MG: at 08:22

## 2017-07-08 RX ADMIN — Medication SCH EA: at 05:17

## 2017-07-08 RX ADMIN — NYSTATIN SCH ML: 100000 SUSPENSION ORAL at 08:22

## 2017-07-08 RX ADMIN — ACETAMINOPHEN PRN MG: 500 TABLET ORAL at 08:36

## 2017-07-08 RX ADMIN — SODIUM CHLORIDE SCH MLS/HR: 900 INJECTION INTRAVENOUS at 06:36

## 2017-07-08 RX ADMIN — INSULIN HUMAN SCH UNIT: 100 INJECTION, SOLUTION PARENTERAL at 10:18

## 2017-07-08 RX ADMIN — AMLODIPINE BESYLATE SCH MG: 5 TABLET ORAL at 08:23

## 2017-07-08 RX ADMIN — INSULIN HUMAN SCH UNIT: 100 INJECTION, SOLUTION PARENTERAL at 16:31

## 2017-07-08 RX ADMIN — ANORECTAL OINTMENT SCH GM: 15.7; .44; 24; 20.6 OINTMENT TOPICAL at 21:29

## 2017-07-08 RX ADMIN — POTASSIUM CHLORIDE SCH MEQ: 750 TABLET, FILM COATED, EXTENDED RELEASE ORAL at 21:29

## 2017-07-08 RX ADMIN — SODIUM CHLORIDE SCH MLS/HR: 900 INJECTION, SOLUTION INTRAVENOUS at 12:11

## 2017-07-08 RX ADMIN — INSULIN HUMAN SCH UNIT: 100 INJECTION, SOLUTION PARENTERAL at 21:29

## 2017-07-08 RX ADMIN — FERROUS SULFATE TAB 325 MG (65 MG ELEMENTAL FE) SCH MG: 325 (65 FE) TAB at 08:22

## 2017-07-08 RX ADMIN — POTASSIUM CHLORIDE SCH MEQ: 750 TABLET, FILM COATED, EXTENDED RELEASE ORAL at 08:22

## 2017-07-08 RX ADMIN — CLOPIDOGREL BISULFATE SCH MG: 75 TABLET, FILM COATED ORAL at 08:22

## 2017-07-08 RX ADMIN — NYSTATIN SCH ML: 100000 SUSPENSION ORAL at 21:29

## 2017-07-08 RX ADMIN — ATORVASTATIN CALCIUM SCH MG: 40 TABLET, FILM COATED ORAL at 21:29

## 2017-07-08 RX ADMIN — SODIUM CHLORIDE SCH MLS/HR: 900 INJECTION, SOLUTION INTRAVENOUS at 03:52

## 2017-07-08 RX ADMIN — ANORECTAL OINTMENT SCH GM: 15.7; .44; 24; 20.6 OINTMENT TOPICAL at 08:21

## 2017-07-08 RX ADMIN — TERBINAFINE HYDROCHLORIDE SCH GM: 1 CREAM TOPICAL at 08:23

## 2017-07-08 RX ADMIN — SODIUM CHLORIDE SCH MLS/HR: 900 INJECTION, SOLUTION INTRAVENOUS at 18:01

## 2017-07-08 RX ADMIN — SODIUM CHLORIDE SCH MLS/HR: 900 INJECTION INTRAVENOUS at 18:02

## 2017-07-08 RX ADMIN — TERBINAFINE HYDROCHLORIDE SCH GM: 1 CREAM TOPICAL at 21:29

## 2017-07-08 RX ADMIN — INSULIN HUMAN SCH UNIT: 100 INJECTION, SOLUTION PARENTERAL at 05:21

## 2017-07-08 RX ADMIN — TERBINAFINE HYDROCHLORIDE SCH GM: 1 CREAM TOPICAL at 12:11

## 2017-07-08 NOTE — PHYSICAL THERAPY DAILY NOTE
PT Daily Note-Current


Subjective


Pt sitting in recliner upon arrival.  Pt refuses PT at first attempt (1005), 

nursing gave pt nausea med to assist for PT.





Pain





   Numeric Pain Scale:  5-Moderate Pain


   Location Body Site:  Back


   Pain Description:  Ache


   Comment:  Pt reported back & bottom pain especially while sitting in BSC for 

BM





Mental Status


Patient Orientation:  Person, Place


Attachments:  IV





Transfers


              Functional [place] Measure


0=Not Assessed/NA   4=Minimal Assistance


1=Total Assistance   5=Supervision or Setup


2=Maximal Assistance   6=Modified [place]


3=Moderate Assistance   7=Complete IndependenceIRFPAI Quality Coding Scale











6 Independent with activity with or without an assistive device


 


5  Patient requires set up or clean up by helper.  Patient completes activity  

by  themselves


 


4 Supervision or touching assist (CGA). [place] provide cues , steadying assist


 


3 The helper provides less than half the effort to complete the activity


 


2 The helper provides more than half the effort to complete the activity


 


1 Dependent.  The helper does all the effort to complete an activity 


 


7 Patient refused to complete or attempt activity


 


9 The patient did not perform the activity before the current illness or injury


 


88 Not attempted due to Medical conditions or safety concerns








Scootin


Sit to/from Stand:  4





Weight Bearing


Weight Bearing Restriction:  Full Weight Bearing


Location Restriction:  LE Bilateral





Gait Training


Gait Assistive Device:  FWW


Pt ambulated to BSC and back to recliner.





Treatments


Pt stood from recliner to transfer to BSC.  Pt starts to urinate and has some 

BM on pad in recliner.  Pt sits on BSC to rest.  Dr Pollard is present and 

examines pt.  PT assists pt with floor clean up to be able to transfer back to 

recliner as well as donning/doffing new gown and socks.  Pt returns to recliner 

with feet up and all needs met at end of tx.





Assessment


Current Status:  Poor Progress


Pt is very agitated and doesn't want to participate in PT to beginning with.  

Due to UTI, pt lacks control of urination.  Pt c/o hurting while sitting on BSC 

but doesn't want to use toilet b/c can't walk or use bed pan b/c bed is 

uncomfortable.





PT Long Term Goals


Long Term Goals


PT Long Term Goals Time Frame:  2017


Transfers (B,C,W/C) (FIM):  3


Gait (FIM):  1


Distance:  10'


Gait Level of Assist:  4


Gait Assistive Device:  FWW





PT Plan


Problem List


Problem List:  Activity Tolerance, Functional Strength, Safety, Balance, Gait, 

Transfer, Bed Mobility





Treatment/Plan


Treatment Plan:  Continue Plan of Care


Treatment Plan:  Bed Mobility, Education, Functional Activity [place], Functional 

Strength, Gait, Safety, Therapeutic Exercise, Transfers


Treatment Duration:  2017


Frequency:  Daily


Estimated Hrs Per Day:  .5 hour per day


Patient and/or Family Agrees t:  Yes





Safety Risks/Education


Patient Education:  Transfer Techniques, Correct Positioning, Safety Issues


Teaching Recipient:  Patient


Teaching Methods:  Discussion


Response to Teaching:  Verbalize Understanding





Time/GCodes


Time In:  1045


Time Out:  1125


Total Billed Treatment Time:  40


Total Billed Treatment


visit, FA x2 (40m)











RUFINA GALLEGOS PTA 2017 11:40

## 2017-07-08 NOTE — PROGRESS NOTE (SOAP)
Subjective


Date Seen by Provider:  2017


Time Seen by Provider:  11:01


Subjective/Events-last exam


Fwup fall in nursing home, UTI, renal insufficiency, DM II, weakness.  Reports 

no headache but is cold. Sitting up in chair and states able to get up well to 

C.





Objective


Exam





Vital Signs








  Date Time  Temp Pulse Resp B/P (MAP) Pulse Ox O2 Delivery O2 Flow Rate FiO2


 


17 23:50 96.0 78 18 123/74 98 Room Air  


 


17 19:35 97.4 91 20 136/71 97 Room Air  


 


17 19:13      Room Air  


 


17 16:45 97.0 99 20 124/57 98 Room Air  


 


17 12:00 98.1 96 16 115/63 98 Room Air  














I & O 


 


 17





 07:00


 


Intake Total 3540 ml


 


Balance 3540 ml





Capillary Refill : Less Than 3 Seconds


General Appearance:  No Apparent Distress


HEENT:  PERRL/EOMI, Other (Bruising to Rt frontal region)


Neck:  Full Range of Motion, Normal Inspection, Supple


Respiratory:  Lungs Clear


Cardiovascular:  Regular Rate, Rhythm, Normal Peripheral Pulses, Gallop/S4


Peripheral Pulses:  2+ Radial Pulses (R), 2+ Radial Pulses (L)


Gastrointestinal:  non tender, soft


Extremity:  Non Tender, Swelling (Rt hand-non-pitting)


Neurologic/Psychiatric:  Alert, Oriented x3


Skin:  Normal Color, Warm/Dry





Results


Lab


Laboratory Tests


17 12:01: Glucometer 186H


17 16:47: Glucometer 209H


17 20:57: Glucometer 120H


17 05:21: Glucometer 121H


17 06:33: 


White Blood Count 13.5H, Red Blood Count 3.85L, Hemoglobin 10.2L, Hematocrit 32L

, Mean Corpuscular Volume 83, Mean Corpuscular Hemoglobin 27, Mean Corpuscular 

Hemoglobin Concent 32, Red Cell Distribution Width 19.4H, Platelet Count 444H, 

Mean Platelet Volume 10.9H, Neutrophils (%) (Auto) 76H, Lymphocytes (%) (Auto) 

16, Monocytes (%) (Auto) 7, Eosinophils (%) (Auto) 1, Basophils (%) (Auto) 0, 

Neutrophils # (Auto) 10.3H, Lymphocytes # (Auto) 2.1, Monocytes # (Auto) 1.0, 

Eosinophils # (Auto) 0.1, Basophils # (Auto) 0.0, Sodium Level 140, Potassium 

Level 4.2, Chloride Level 116H, Carbon Dioxide Level 11L, Anion Gap 13, Blood 

Urea Nitrogen 26H, Creatinine 2.02H, Estimat Glomerular Filtration Rate 24, BUN/

Creatinine Ratio 13, Glucose Level 142H, Calcium Level 8.6, Total Bilirubin 0.3

, Aspartate Amino Transf (AST/SGOT) 13, Alanine Aminotransferase (ALT/SGPT) 9, 

Alkaline Phosphatase 60, Total Protein 6.4, Albumin 3.1L





Microbiology


17 Urine Culture - Final, Complete


         Escherichia Coli





Assessment/Plan


Assessment/Plan


Assess & Plan/Chief Complaint


1. Acute renal failure- Continue merrem for UTI and decrease IVF to 75cc/hr


2. Urinary tract infection- Continue merrem 


3. COPD- RT protocol, continue duo nebs


4. Diabetes- continue sliding scale insulin replacement


5. Coronary artery disease- Continue home medications


6. Hypomagnesium- magnesium replacement





Clinical Quality Measures


DVT/VTE Risk/Contraindication:


Risk Factor Score Per Nursin


RFS Level Per Nursing on Admit:  4+=Very High











STEPHANIE HAIDER DO 2017 08:48

## 2017-07-09 VITALS — DIASTOLIC BLOOD PRESSURE: 58 MMHG | SYSTOLIC BLOOD PRESSURE: 130 MMHG

## 2017-07-09 VITALS — DIASTOLIC BLOOD PRESSURE: 78 MMHG | SYSTOLIC BLOOD PRESSURE: 137 MMHG

## 2017-07-09 VITALS — SYSTOLIC BLOOD PRESSURE: 148 MMHG | DIASTOLIC BLOOD PRESSURE: 74 MMHG

## 2017-07-09 LAB
ANION GAP SERPL CALC-SCNC: 9 MMOL/L (ref 5–14)
BASOPHILS # BLD AUTO: 0 10^3/UL (ref 0–0.1)
BASOPHILS NFR BLD AUTO: 0 % (ref 0–10)
BUN SERPL-MCNC: 23 MG/DL (ref 7–18)
BUN/CREAT SERPL: 15
CALCIUM SERPL-MCNC: 8.6 MG/DL (ref 8.5–10.1)
CHLORIDE SERPL-SCNC: 120 MMOL/L (ref 98–107)
CO2 SERPL-SCNC: 12 MMOL/L (ref 21–32)
CREAT SERPL-MCNC: 1.56 MG/DL (ref 0.6–1.3)
EOSINOPHIL # BLD AUTO: 0.1 10^3/UL (ref 0–0.3)
EOSINOPHIL NFR BLD AUTO: 1 % (ref 0–10)
ERYTHROCYTE [DISTWIDTH] IN BLOOD BY AUTOMATED COUNT: 19.6 % (ref 10–14.5)
GFR SERPLBLD BASED ON 1.73 SQ M-ARVRAT: 33 ML/MIN
GLUCOSE SERPL-MCNC: 156 MG/DL (ref 70–105)
LYMPHOCYTES # BLD AUTO: 1.6 X 10^3 (ref 1–4)
LYMPHOCYTES NFR BLD AUTO: 16 % (ref 12–44)
MAGNESIUM SERPL-MCNC: 1.6 MG/DL (ref 1.8–2.4)
MCH RBC QN AUTO: 26 PG (ref 25–34)
MCHC RBC AUTO-ENTMCNC: 31 G/DL (ref 32–36)
MCV RBC AUTO: 84 FL (ref 80–99)
MONOCYTES # BLD AUTO: 0.6 X 10^3 (ref 0–1)
MONOCYTES NFR BLD AUTO: 6 % (ref 0–12)
NEUTROPHILS # BLD AUTO: 7.6 X 10^3 (ref 1.8–7.8)
NEUTROPHILS NFR BLD AUTO: 77 % (ref 42–75)
PLATELET # BLD: 415 10^3/UL (ref 130–400)
PMV BLD AUTO: 11.1 FL (ref 7.4–10.4)
POTASSIUM SERPL-SCNC: 4.4 MMOL/L (ref 3.6–5)
RBC # BLD AUTO: 3.49 10^6/UL (ref 4.35–5.85)
SODIUM SERPL-SCNC: 141 MMOL/L (ref 135–145)
WBC # BLD AUTO: 9.9 10^3/UL (ref 4.3–11)

## 2017-07-09 RX ADMIN — SODIUM CHLORIDE SCH MLS/HR: 900 INJECTION INTRAVENOUS at 15:09

## 2017-07-09 RX ADMIN — Medication SCH MG: at 09:41

## 2017-07-09 RX ADMIN — FAMOTIDINE SCH MG: 20 TABLET, FILM COATED ORAL at 09:41

## 2017-07-09 RX ADMIN — SODIUM CHLORIDE SCH MLS/HR: 900 INJECTION, SOLUTION INTRAVENOUS at 09:44

## 2017-07-09 RX ADMIN — SODIUM CHLORIDE SCH MLS/HR: 900 INJECTION INTRAVENOUS at 05:34

## 2017-07-09 RX ADMIN — POTASSIUM CHLORIDE SCH MEQ: 750 TABLET, FILM COATED, EXTENDED RELEASE ORAL at 23:16

## 2017-07-09 RX ADMIN — TERBINAFINE HYDROCHLORIDE SCH GM: 1 CREAM TOPICAL at 13:03

## 2017-07-09 RX ADMIN — FERROUS SULFATE TAB 325 MG (65 MG ELEMENTAL FE) SCH MG: 325 (65 FE) TAB at 09:41

## 2017-07-09 RX ADMIN — TERBINAFINE HYDROCHLORIDE SCH GM: 1 CREAM TOPICAL at 09:43

## 2017-07-09 RX ADMIN — INSULIN HUMAN SCH UNIT: 100 INJECTION, SOLUTION PARENTERAL at 05:34

## 2017-07-09 RX ADMIN — INSULIN HUMAN SCH UNIT: 100 INJECTION, SOLUTION PARENTERAL at 20:17

## 2017-07-09 RX ADMIN — ANORECTAL OINTMENT SCH GM: 15.7; .44; 24; 20.6 OINTMENT TOPICAL at 09:43

## 2017-07-09 RX ADMIN — POTASSIUM CHLORIDE SCH MEQ: 750 TABLET, FILM COATED, EXTENDED RELEASE ORAL at 09:41

## 2017-07-09 RX ADMIN — ATORVASTATIN CALCIUM SCH MG: 40 TABLET, FILM COATED ORAL at 23:16

## 2017-07-09 RX ADMIN — NYSTATIN SCH ML: 100000 SUSPENSION ORAL at 09:41

## 2017-07-09 RX ADMIN — NYSTATIN SCH ML: 100000 SUSPENSION ORAL at 23:16

## 2017-07-09 RX ADMIN — ANORECTAL OINTMENT SCH GM: 15.7; .44; 24; 20.6 OINTMENT TOPICAL at 23:16

## 2017-07-09 RX ADMIN — CLOPIDOGREL BISULFATE SCH MG: 75 TABLET, FILM COATED ORAL at 09:41

## 2017-07-09 RX ADMIN — TERBINAFINE HYDROCHLORIDE SCH GM: 1 CREAM TOPICAL at 23:16

## 2017-07-09 RX ADMIN — Medication SCH EA: at 08:24

## 2017-07-09 RX ADMIN — INSULIN HUMAN SCH UNIT: 100 INJECTION, SOLUTION PARENTERAL at 11:52

## 2017-07-09 RX ADMIN — INSULIN HUMAN SCH UNIT: 100 INJECTION, SOLUTION PARENTERAL at 16:48

## 2017-07-09 RX ADMIN — SODIUM CHLORIDE SCH MLS/HR: 900 INJECTION INTRAVENOUS at 23:16

## 2017-07-09 RX ADMIN — AMLODIPINE BESYLATE SCH MG: 5 TABLET ORAL at 09:41

## 2017-07-09 RX ADMIN — Medication SCH MG: at 23:16

## 2017-07-09 NOTE — PROGRESS NOTE (SOAP)
Subjective


Date Seen by Provider:  2017


Time Seen by Provider:  11:29


Subjective/Events-last exam


Fwup fall in nursing home, UTI, renal insufficiency, DM II, weakness.  Sitting 

up in bed.  Reports cold--wants more covers.





Objective


Exam





Vital Signs








  Date Time  Temp Pulse Resp B/P (MAP) Pulse Ox O2 Delivery O2 Flow Rate FiO2


 


17 08:42 97.3 91 18 148/74 98 Room Air  


 


17 00:00 98.6 92 22 130/58 93 Room Air  


 


17 18:24     98 Room Air  


 


17 15:40 96.9 89 18 114/58 97 Room Air  














I & O 


 


 17





 07:00


 


Intake Total 1200 ml


 


Balance 1200 ml





Capillary Refill : Less Than 3 Seconds


General Appearance:  No Apparent Distress


Neck:  Supple


Respiratory:  Lungs Clear


Cardiovascular:  Regular Rate, Rhythm, Systolic Murmur


Gastrointestinal:  normal bowel sounds, non tender, soft


Extremity:  Non Tender, No Calf Tenderness, Pedal Edema


Neurologic/Psychiatric:  Alert, Oriented x3


Skin:  Ecchymosis (right forehead), Other (wounds to heals and lower legs with 

dressings in place)





Results


Lab


Laboratory Tests


17 15:41: Glucometer 147H


17 19:45: Glucometer 236H


17 05:14: Glucometer 167H


17 05:33: 


White Blood Count 9.9, Red Blood Count 3.49L, Hemoglobin 9.1L, Hematocrit 29L, 

Mean Corpuscular Volume 84, Mean Corpuscular Hemoglobin 26, Mean Corpuscular 

Hemoglobin Concent 31L, Red Cell Distribution Width 19.6H, Platelet Count 415H, 

Mean Platelet Volume 11.1H, Neutrophils (%) (Auto) 77H, Lymphocytes (%) (Auto) 

16, Monocytes (%) (Auto) 6, Eosinophils (%) (Auto) 1, Basophils (%) (Auto) 0, 

Neutrophils # (Auto) 7.6, Lymphocytes # (Auto) 1.6, Monocytes # (Auto) 0.6, 

Eosinophils # (Auto) 0.1, Basophils # (Auto) 0.0, Sodium Level 141, Potassium 

Level 4.4, Chloride Level 120H, Carbon Dioxide Level 12L, Anion Gap 9, Blood 

Urea Nitrogen 23H, Creatinine 1.56H, Estimat Glomerular Filtration Rate 33, BUN/

Creatinine Ratio 15, Glucose Level 156H, Calcium Level 8.6, Magnesium Level 1.6L


17 10:53: Glucometer 209H





Microbiology


17 Urine Culture - Final, Complete


         Escherichia Coli





Assessment/Plan


Assessment/Plan


Assess & Plan/Chief Complaint


1. Acute renal failure- Continue merrem for UTI and continue IVF, Cr improved 

today


2. Urinary tract infection with E coli- Continue merrem 


3. COPD- RT protocol, continue duo nebs


4. Diabetes- continue sliding scale insulin replacement


5. Coronary artery disease- Continue home medications


6. Hypomagnesium- magnesium replacement





Clinical Quality Measures


DVT/VTE Risk/Contraindication:


Risk Factor Score Per Nursin


RFS Level Per Nursing on Admit:  4+=Very High











STEPHANIE HAIDER DO 2017 11:32

## 2017-07-10 VITALS — SYSTOLIC BLOOD PRESSURE: 136 MMHG | DIASTOLIC BLOOD PRESSURE: 63 MMHG

## 2017-07-10 VITALS — DIASTOLIC BLOOD PRESSURE: 64 MMHG | SYSTOLIC BLOOD PRESSURE: 138 MMHG

## 2017-07-10 VITALS — DIASTOLIC BLOOD PRESSURE: 75 MMHG | SYSTOLIC BLOOD PRESSURE: 145 MMHG

## 2017-07-10 LAB
ANION GAP SERPL CALC-SCNC: 8 MMOL/L (ref 5–14)
BASOPHILS # BLD AUTO: 0 10^3/UL (ref 0–0.1)
BASOPHILS NFR BLD AUTO: 0 % (ref 0–10)
BILIRUB UR QL STRIP: NEGATIVE
BUN SERPL-MCNC: 15 MG/DL (ref 7–18)
BUN/CREAT SERPL: 14
CALCIUM SERPL-MCNC: 8.4 MG/DL (ref 8.5–10.1)
CHLORIDE SERPL-SCNC: 120 MMOL/L (ref 98–107)
CO2 SERPL-SCNC: 14 MMOL/L (ref 21–32)
CREAT SERPL-MCNC: 1.1 MG/DL (ref 0.6–1.3)
EOSINOPHIL # BLD AUTO: 0.1 10^3/UL (ref 0–0.3)
EOSINOPHIL NFR BLD AUTO: 1 % (ref 0–10)
ERYTHROCYTE [DISTWIDTH] IN BLOOD BY AUTOMATED COUNT: 19.7 % (ref 10–14.5)
GFR SERPLBLD BASED ON 1.73 SQ M-ARVRAT: 49 ML/MIN
GLUCOSE SERPL-MCNC: 151 MG/DL (ref 70–105)
KETONES UR QL STRIP: NEGATIVE
LEUKOCYTE ESTERASE UR QL STRIP: NEGATIVE
LYMPHOCYTES # BLD AUTO: 1.1 X 10^3 (ref 1–4)
LYMPHOCYTES NFR BLD AUTO: 9 % (ref 12–44)
MAGNESIUM SERPL-MCNC: 1.6 MG/DL (ref 1.8–2.4)
MCH RBC QN AUTO: 26 PG (ref 25–34)
MCHC RBC AUTO-ENTMCNC: 32 G/DL (ref 32–36)
MCV RBC AUTO: 83 FL (ref 80–99)
MONOCYTES # BLD AUTO: 0.6 X 10^3 (ref 0–1)
MONOCYTES NFR BLD AUTO: 5 % (ref 0–12)
NEUTROPHILS # BLD AUTO: 10.3 X 10^3 (ref 1.8–7.8)
NEUTROPHILS NFR BLD AUTO: 85 % (ref 42–75)
NITRITE UR QL STRIP: NEGATIVE
PH UR STRIP: 5 [PH] (ref 5–9)
PLATELET # BLD: 434 10^3/UL (ref 130–400)
PMV BLD AUTO: 11.1 FL (ref 7.4–10.4)
POTASSIUM SERPL-SCNC: 4 MMOL/L (ref 3.6–5)
PROT UR QL STRIP: (no result)
RBC # BLD AUTO: 3.42 10^6/UL (ref 4.35–5.85)
SODIUM SERPL-SCNC: 142 MMOL/L (ref 135–145)
SP GR UR STRIP: 1.01 (ref 1.02–1.02)
SQUAMOUS #/AREA URNS HPF: (no result) /HPF
UROBILINOGEN UR-MCNC: NORMAL MG/DL
WBC # BLD AUTO: 12 10^3/UL (ref 4.3–11)
WBC #/AREA URNS HPF: (no result) /HPF

## 2017-07-10 RX ADMIN — SODIUM CHLORIDE SCH MLS/HR: 900 INJECTION, SOLUTION INTRAVENOUS at 02:08

## 2017-07-10 RX ADMIN — ANORECTAL OINTMENT SCH GM: 15.7; .44; 24; 20.6 OINTMENT TOPICAL at 22:32

## 2017-07-10 RX ADMIN — SODIUM CHLORIDE SCH MLS/HR: 900 INJECTION INTRAVENOUS at 13:16

## 2017-07-10 RX ADMIN — INSULIN HUMAN SCH UNIT: 100 INJECTION, SOLUTION PARENTERAL at 10:51

## 2017-07-10 RX ADMIN — NYSTATIN SCH ML: 100000 SUSPENSION ORAL at 08:03

## 2017-07-10 RX ADMIN — TERBINAFINE HYDROCHLORIDE SCH GM: 1 CREAM TOPICAL at 13:16

## 2017-07-10 RX ADMIN — Medication SCH MG: at 08:04

## 2017-07-10 RX ADMIN — INSULIN HUMAN SCH UNIT: 100 INJECTION, SOLUTION PARENTERAL at 06:22

## 2017-07-10 RX ADMIN — MAGNESIUM SULFATE IN DEXTROSE SCH MLS/HR: 10 INJECTION, SOLUTION INTRAVENOUS at 08:43

## 2017-07-10 RX ADMIN — TERBINAFINE HYDROCHLORIDE SCH GM: 1 CREAM TOPICAL at 08:05

## 2017-07-10 RX ADMIN — ANORECTAL OINTMENT SCH GM: 15.7; .44; 24; 20.6 OINTMENT TOPICAL at 08:04

## 2017-07-10 RX ADMIN — Medication SCH MG: at 22:31

## 2017-07-10 RX ADMIN — CLOPIDOGREL BISULFATE SCH MG: 75 TABLET, FILM COATED ORAL at 08:04

## 2017-07-10 RX ADMIN — ATORVASTATIN CALCIUM SCH MG: 40 TABLET, FILM COATED ORAL at 22:31

## 2017-07-10 RX ADMIN — SODIUM CHLORIDE SCH MLS/HR: 900 INJECTION, SOLUTION INTRAVENOUS at 18:25

## 2017-07-10 RX ADMIN — FERROUS SULFATE TAB 325 MG (65 MG ELEMENTAL FE) SCH MG: 325 (65 FE) TAB at 08:04

## 2017-07-10 RX ADMIN — ACETAMINOPHEN PRN MG: 500 TABLET ORAL at 02:19

## 2017-07-10 RX ADMIN — SODIUM CHLORIDE SCH MLS/HR: 900 INJECTION INTRAVENOUS at 06:28

## 2017-07-10 RX ADMIN — FAMOTIDINE SCH MG: 20 TABLET, FILM COATED ORAL at 08:04

## 2017-07-10 RX ADMIN — MAGNESIUM SULFATE IN DEXTROSE SCH MLS/HR: 10 INJECTION, SOLUTION INTRAVENOUS at 09:44

## 2017-07-10 RX ADMIN — NYSTATIN SCH ML: 100000 SUSPENSION ORAL at 22:31

## 2017-07-10 RX ADMIN — Medication SCH EA: at 06:28

## 2017-07-10 RX ADMIN — TERBINAFINE HYDROCHLORIDE SCH GM: 1 CREAM TOPICAL at 22:32

## 2017-07-10 RX ADMIN — AMLODIPINE BESYLATE SCH MG: 5 TABLET ORAL at 08:04

## 2017-07-10 RX ADMIN — INSULIN HUMAN SCH UNIT: 100 INJECTION, SOLUTION PARENTERAL at 14:35

## 2017-07-10 RX ADMIN — INSULIN HUMAN SCH UNIT: 100 INJECTION, SOLUTION PARENTERAL at 19:58

## 2017-07-10 RX ADMIN — POTASSIUM CHLORIDE SCH MEQ: 750 TABLET, FILM COATED, EXTENDED RELEASE ORAL at 08:04

## 2017-07-10 RX ADMIN — POTASSIUM CHLORIDE SCH MEQ: 750 TABLET, FILM COATED, EXTENDED RELEASE ORAL at 22:31

## 2017-07-10 NOTE — PROGRESS NOTE (SOAP)
Subjective


Time Seen by Provider:  07:35


Subjective/Events-last exam


patient feeling much better today.


Patient having much diarrhea.


Patient has history of C. difficile.


Kidney function has improved to 49 today.


Acute renal failure resolving.


 infection.


COPD.


Diabetes.


UTI.


Hypomagnesemia





Objective


Exam





Vital Signs








  Date Time  Temp Pulse Resp B/P (MAP) Pulse Ox O2 Delivery O2 Flow Rate FiO2


 


7/10/17 07:23      Room Air  


 


7/10/17 00:50 97.9 90 20 138/64 94 Room Air  


 


17 20:00     94 Room Air  


 


17 18:37      Room Air  


 


17 16:45 98.2 91 18 137/78 96 Room Air  


 


17 08:42 97.3 91 18 148/74 98 Room Air  














I & O 


 


 7/10/17





 07:00


 


Intake Total 2600 ml


 


Balance 2600 ml





Capillary Refill : Less Than 3 Seconds


General Appearance:  No Apparent Distress, WD/WN


HEENT:  Normal ENT Inspection


Neck:  Full Range of Motion, Normal Inspection


Respiratory:  Chest Non Tender, Lungs Clear, Normal Breath Sounds, No Accessory 

Muscle Use, No Respiratory Distress


Cardiovascular:  Regular Rate, Rhythm, No Murmur


Gastrointestinal:  non tender, soft





Results


Lab


Laboratory Tests


17 10:53: Glucometer 209H


17 14:49: Glucometer 169H


17 16:46: Glucometer 127H


17 20:16: Glucometer 103


7/10/17 05:45: 


White Blood Count 12.0H, Red Blood Count 3.42L, Hemoglobin 9.0L, Hematocrit 28L

, Mean Corpuscular Volume 83, Mean Corpuscular Hemoglobin 26, Mean Corpuscular 

Hemoglobin Concent 32, Red Cell Distribution Width 19.7H, Platelet Count 434H, 

Mean Platelet Volume 11.1H, Neutrophils (%) (Auto) 85H, Lymphocytes (%) (Auto) 

9L, Monocytes (%) (Auto) 5, Eosinophils (%) (Auto) 1, Basophils (%) (Auto) 0, 

Neutrophils # (Auto) 10.3H, Lymphocytes # (Auto) 1.1, Monocytes # (Auto) 0.6, 

Eosinophils # (Auto) 0.1, Basophils # (Auto) 0.0, Sodium Level 142, Potassium 

Level 4.0, Chloride Level 120H, Carbon Dioxide Level 14L, Anion Gap 8, Blood 

Urea Nitrogen 15, Creatinine 1.10, Estimat Glomerular Filtration Rate 49, BUN/

Creatinine Ratio 14, Glucose Level 151H, Calcium Level 8.4L, Magnesium Level 

1.6L





Microbiology


17 Urine Culture - Final, Complete


         Escherichia Coli





Assessment/Plan


Assessment/Plan


Assess & Plan/Chief Complaint


acute renal failure resolving.


Diarrhea checking for C. difficile.


Anemia.


UTI checking today.


COPD.


Diabetes.


Hypertension history.


Coronary artery disease.


Hypomagnesemia.


Patient feeling better





Clinical Quality Measures


DVT/VTE Risk/Contraindication:


Risk Factor Score Per Nursin


RFS Level Per Nursing on Admit:  4+=Very High











RODY MOSER DO Jul 10, 2017 07:43

## 2017-07-10 NOTE — XMS REPORT
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                    GELLENDER DO, RODY MAZARIEGOS                    Ot   
                 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 
CORONARY                                      

 

 2017                    GELDER , RODY MAZARIEGOS                    Ot   
                 J44.9                    CHRONIC OBSTRUCTIVE PULMONARY DISEASE
, U                                     

 

 2017                    GELLENDER DO, RODY MAZARIEGOS                    Ot   
                 M54.2                    CERVICALGIA                          
           

 

 2017                    GELLENDER DO, RODY MAZARIEGOS                    Ot   
                 M54.9                    DORSALGIA, UNSPECIFIED               
                      

 

 2017                    GELLENDER DO, RODY MAZARIEGOS                    Ot   
                 N17.9                    ACUTE KIDNEY FAILURE, UNSPECIFIED    
                                 

 

 2017                    GELLENDER DO, RODY MAZARIEGOS                    Ot   
                 N39.0                    URINARY TRACT INFECTION, SITE NOT 
SPECIF                                     

 

 2017                    GELLENDER DO, RODY MAZARIEGOS                    Ot   
                 R26.81                    UNSTEADINESS ON FEET                
                     

 

 2017                    GELLENDER DO, RODY MAZARIEGOS                    Ot   
                 R32                    UNSPECIFIED URINARY INCONTINENCE       
                              

 

 2017                    GELLENDER DORODY                    Ot   
                 R53.1                    WEAKNESS                             
        

 

 2017                    GELLENDER DO, RODY MAZARIEGOS                    Ot   
                 Z79.84                    LONG TERM (CURRENT) USE OF ORAL 
HYPOGLYC                                     

 

 2017                    GELLENDER DO, RODY MAZARIEGOS                    Ot   
                 Z86.73                    PRSNL HX OF TIA (TIA), AND CEREB 
INFRC W                                     

 

 2017                    GELLENDER DO, RODY MAZARIEGOS                    Ot   
                 Z87.891                    PERSONAL HISTORY OF NICOTINE 
DEPENDENCE                                     

 

 2017                    GELLENDER DO, RODY MAZARIEGOS                    Ot   
                 R39.9                    UNSP SYMPTOMS AND SIGNS INVOLVING THE 
GE                                     

 

 2017                    GELLENDER DO, RODY MAZARIEGOS                    Ot   
                 R39.9                    UNSP SYMPTOMS AND SIGNS INVOLVING THE 
GE                                     

 

 2017                    GELLENDER DO, RODY MAZARIEGOS                    Ot   
                 E56.9                    VITAMIN DEFICIENCY, UNSPECIFIED      
                               

 

 2017                    GELLENDER DO, RODY MAZARIEGOS                    Ot   
                 E78.4                    OTHER HYPERLIPIDEMIA                 
                    

 

 2017                    GELLENDER DO, RODY YAIR                    Ot   
                 K59.00                    CONSTIPATION, UNSPECIFIED           
                          

 

 06/15/2017                    GELLENDER DO, RODY MAZARIEGOS                    Ot   
                 E56.9                    VITAMIN DEFICIENCY, UNSPECIFIED      
                               

 

 06/15/2017                    GELLENDER DO, RODY YAIR                    Ot   
                 E78.4                    OTHER HYPERLIPIDEMIA                 
                    

 

 06/15/2017                    GELLENDER DO, RODY MAZARIEGOS                    Ot   
                 K59.00                    CONSTIPATION, UNSPECIFIED           
                          

 

 2017                    GELLENDER DO, RODY MAZARIEGOS                    Ot   
                 N19                    UNSPECIFIED KIDNEY FAILURE             
                        

 

 2017                    GELLENDER DO, RODY MAZARIEGOS                    Ot   
                 N19                    UNSPECIFIED KIDNEY FAILURE             
                        

 

 2017                    GELLENDER DO, RODY MAZARIEGOS                    Ot   
                 R19.5                    OTHER FECAL ABNORMALITIES            
                         

 

 2017                    GELLENDER DO, RODY MAZARIEGOS                    Ot   
                 R19.7                    DIARRHEA, UNSPECIFIED                
                     

 

 2017                    GELLENDER DO, RODY MAZARIEGOS                    Ot   
                 R19.5                    OTHER FECAL ABNORMALITIES            
                         

 

 2017                    GELLENDER DO, RODY MAZARIEGOS                    Ot   
                 R19.7                    DIARRHEA, UNSPECIFIED                
                     

 

 2017                    GELLENDER DO, RODY MAZARIEGOS                    Ot   
                 E11.9                    TYPE 2 DIABETES MELLITUS WITHOUT 
COMPLIC                                     

 

 2017                    GELLENDER DO, RODY MAZARIEGOS                    Ot   
                 E78.00                    PURE HYPERCHOLESTEROLEMIA, 
UNSPECIFIED                                     

 

 2017                    GELLENDER DO, RODY MAZARIEGOS                    Ot   
                 F17.210                    NICOTINE DEPENDENCE, CIGARETTES, 
UNCOMPL                                     

 

 2017                    GELLENDER DO, RODY MAZARIEGOS                    Ot   
                 F32.9                    MAJOR DEPRESSIVE DISORDER, SINGLE 
EPISOD                                     

 

 2017                    GELLENDER DO, RODY MAZARIEGOS                    Ot   
                 G47.10                    HYPERSOMNIA, UNSPECIFIED            
                         

 

 2017                    GELLENDER DO, RODY MAZARIEGOS                    Ot   
                 I10                    ESSENTIAL (PRIMARY) HYPERTENSION       
                              

 

 2017                    GELLENDER DO, RODY MAZARIEGOS                    Ot   
                 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 
CORONARY                                      

 

 2017                    GELLENDER DO, RODY MAZARIEGOS                    Ot   
                 J44.9                    CHRONIC OBSTRUCTIVE PULMONARY DISEASE
, U                                     

 

 2017                    GELLENDER DO, RODY MAZARIEGOS                    Ot   
                 K59.00                    CONSTIPATION, UNSPECIFIED           
                          

 

 2017                    GELLENDER DO, RODY MAZARIEGOS                    Ot   
                 M25.512                    PAIN IN LEFT SHOULDER              
                       

 

 2017                    GELLENDER DO, RODY MAZARIEGOS                    Ot   
                 M54.2                    CERVICALGIA                          
           

 

 2017                    GELLENDER DO, RODY MAZARIEGOS                    Ot   
                 M54.9                    DORSALGIA, UNSPECIFIED               
                      

 

 2017                    GELLENDER DO, RODY MAZARIEGOS                    Ot   
                 N17.9                    ACUTE KIDNEY FAILURE, UNSPECIFIED    
                                 

 

 2017                    GELLENDER DO, RODY MAZARIEGOS                    Ot   
                 R26.81                    UNSTEADINESS ON FEET                
                     

 

 2017                    ERNSTLENDER DO, RODY MAZARIEGOS                    Ot   
                 R53.1                    WEAKNESS                             
        

 

 2017                    EHSAN PONCE, RODY MAZARIEGOS                    Ot   
                 S00.83XA                    CONTUSION OF OTHER PART OF HEAD, 
INITIAL                                     

 

 2017                    EHSAN PONCE, RODY MAZARIEGOS                    Ot   
                 W05.0XXA                    FALL FROM NON-MOVING WHEELCHAIR, 
INITIAL                                     

 

 2017                    EHSAN PONCE, RODY MAZARIEGOS                    Ot   
                 Y92.129                    UNSP PLACE IN NURSING HOME AS PLACE
                                     

 

 2017                    EHSAN DO, RODY MAZARIEGOS                    Ot   
                 Z79.02                    LONG TERM (CURRENT) USE OF 
ANTITHROMBOTI                                     

 

 2017                    ERNSTLENDER , RODY MAZARIEGOS                    Ot   
                 Z79.84                    LONG TERM (CURRENT) USE OF ORAL 
HYPOGLYC                                     

 

 2017                    ERNSTLEN, RODY MAZARIEGOS                    Ot   
                 Z86.73                    PRSNL HX OF TIA (TIA), AND CEREB 
INFRC W                                     

 

 2017                    EHSAN , RODY MAZARIEGOS                    Ot   
                 E11.9                    TYPE 2 DIABETES MELLITUS WITHOUT 
COMPLIC                                     

 

 2017                    ERNSTDER , RODY MAZARIEGOS                    Ot   
                 E78.00                    PURE HYPERCHOLESTEROLEMIA, 
UNSPECIFIED                                     

 

 2017                    GELLENDER DO, RODY MAZARIEGOS                    Ot   
                 F17.210                    NICOTINE DEPENDENCE, CIGARETTES, 
UNCOMPL                                     

 

 2017                    EHSAN PONCERODY                    Ot   
                 F32.9                    MAJOR DEPRESSIVE DISORDER, SINGLE 
EPISOD                                     

 

 2017                    NORAHDER DO, RODY MAZARIEGOS                    Ot   
                 G47.10                    HYPERSOMNIA, UNSPECIFIED            
                         

 

 2017                    EHSAN PONCERODY                    Ot   
                 I10                    ESSENTIAL (PRIMARY) HYPERTENSION       
                              

 

 2017                    EHSAN PONCERODY                    Ot   
                 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 
CORONARY                                      

 

 2017                    ERNSTDER DORODY                    Ot   
                 J44.9                    CHRONIC OBSTRUCTIVE PULMONARY DISEASE
, U                                     

 

 2017                    ERNSTLENDER DO, RODY MAZARIEGOS                    Ot   
                 K59.00                    CONSTIPATION, UNSPECIFIED           
                          

 

 2017                    ERNSTLENDER DORODY                    Ot   
                 M25.512                    PAIN IN LEFT SHOULDER              
                       

 

 2017                    ERNSTLENDER DO, RODY MAZARIEGOS                    Ot   
                 M54.2                    CERVICALGIA                          
           

 

 2017                    GELLENDER DORODY                    Ot   
                 M54.9                    DORSALGIA, UNSPECIFIED               
                      

 

 2017                    GELLENDER DORODY                    Ot   
                 N17.9                    ACUTE KIDNEY FAILURE, UNSPECIFIED    
                                 

 

 2017                    GELLENDER DO, RODY MAZARIEGOS                    Ot   
                 R26.81                    UNSTEADINESS ON FEET                
                     

 

 2017                    GELLENDER DO, RODY MAZARIEGOS                    Ot   
                 R53.1                    WEAKNESS                             
        

 

 2017                    NORAHDER RODY PONCE                    Ot   
                 S00.83XA                    CONTUSION OF OTHER PART OF HEAD, 
INITIAL                                     

 

 2017                    RODY MOSER DO                    Ot   
                 W05.0XXA                    FALL FROM NON-MOVING WHEELCHAIR, 
INITIAL                                     

 

 2017                    EHSAN PONCE, RODY MAZARIEGOS                    Ot   
                 Y92.129                    UNSP PLACE IN NURSING HOME AS PLACE
                                     

 

 2017                    NORAHDER DO, RODY MAZARIEGOS                    Ot   
                 Z79.02                    LONG TERM (CURRENT) USE OF 
ANTITHROMBOTI                                     

 

 2017                    ERNSTLENDER DO, RODY MAZARIEGOS                    Ot   
                 Z79.84                    LONG TERM (CURRENT) USE OF ORAL 
HYPOGLYC                                     

 

 2017                    ERNSTLENDER DO, RODY MAZARIEGOS                    Ot   
                 Z86.73                    PRSNL HX OF TIA (TIA), AND CEREB 
INFRC W                                     



                                                                               
                                                                               
                                                                               
                                                                               
                                                                               
                                                                               
                                                                               
                                                                               
                                                                               
                                                                               
                                                                               
                                                                               
                                                                               
                                                                               
                                                                               
                                                                               
                                                                               
                                                                               
                                                                               
                                                                               
                                                                               
                                                                               
                                                                               
                                                                               
                                                                               
             



Procedures

                                                                               
         



Results

                      





 Test                    Result                    Range                









 Complete urinalysis with reflex to culture - 17 10:20                









 Urine color determination                    YELLOW                     NRG   
             

 

 Urine clarity determination                    CLEAR                     NRG  
              

 

 Urine pH measurement by test strip                    6.5                     5
-9                

 

 Specific gravity of urine by test strip                    1.010              
       1.016-1.022                

 

 Urine protein assay by test strip, semi-quantitative                    2+    
                 NEGATIVE                

 

 Urine glucose detection by automated test strip                    NEGATIVE   
                  NEGATIVE                

 

 Erythrocytes detection in urine sediment by light microscopy                  
  NEGATIVE                     NEGATIVE                

 

 Urine ketones detection by automated test strip                    NEGATIVE   
                  NEGATIVE                

 

 Urine nitrite detection by test strip                    NEGATIVE             
        NEGATIVE                

 

 Urine total bilirubin detection by test strip                    NEGATIVE     
                NEGATIVE                

 

 Urine urobilinogen measurement by automated test strip (mass/volume)          
          NORMAL                     NORMAL                

 

 Urine leukocyte esterase detection by dipstick                    NEGATIVE    
                 NEGATIVE                

 

 Automated urine sediment erythrocyte count by microscopy (number/high power 
field)                    NONE                     NRG                

 

 Automated urine sediment leukocyte count by microscopy (number/high power field
)                    NONE                     NRG                

 

 Bacteria detection in urine sediment by light microscopy                    
TRACE                     NRG                

 

 Squamous epithelial cells detection in urine sediment by light microscopy     
               0-2                     NRG                

 

 Crystals detection in urine sediment by light microscopy                    
NONE                     NRG                

 

 Casts detection in urine sediment by light microscopy                    NONE 
                    NRG                

 

 Mucus detection in urine sediment by light microscopy                    
NEGATIVE                     NRG                

 

 Complete urinalysis with reflex to culture                    NO              
       NRG                

 

 Renal epithelial cells detection in urine sediment by light microscopy        
            NONE                     NRG                









 Complete urinalysis with reflex to culture - 17 01:56                









 Urine color determination                    YELLOW                     NRG   
             

 

 Urine clarity determination                    VERY CLOUDY                     
NRG                

 

 Urine pH measurement by test strip                    5                     5-
9                

 

 Specific gravity of urine by test strip                    1.025              
       1.016-1.022                

 

 Urine protein assay by test strip, semi-quantitative                    3+    
                 NEGATIVE                

 

 Urine glucose detection by automated test strip                    NEGATIVE   
                  NEGATIVE                

 

 Erythrocytes detection in urine sediment by light microscopy                  
  2+                     NEGATIVE                

 

 Urine ketones detection by automated test strip                    NEGATIVE   
                  NEGATIVE                

 

 Urine nitrite detection by test strip                    NEGATIVE             
        NEGATIVE                

 

 Urine total bilirubin detection by test strip                    NEGATIVE     
                NEGATIVE                

 

 Urine urobilinogen measurement by automated test strip (mass/volume)          
          NORMAL                     NORMAL                

 

 Urine leukocyte esterase detection by dipstick                    NEGATIVE    
                 NEGATIVE                

 

 Automated urine sediment erythrocyte count by microscopy (number/high power 
field)                    NONE                     NRG                

 

 Automated urine sediment leukocyte count by microscopy (number/high power field
)                    RARE                     NRG                

 

 Bacteria detection in urine sediment by light microscopy                    
LARGE                     NRG                

 

 Squamous epithelial cells detection in urine sediment by light microscopy     
               2-5                     NRG                

 

 Crystals detection in urine sediment by light microscopy                    
NONE                     NRG                

 

 Casts detection in urine sediment by light microscopy                    NONE 
                    NRG                

 

 Mucus detection in urine sediment by light microscopy                    
NEGATIVE                     NRG                

 

 Complete urinalysis with reflex to culture                    YES             
        NRG                









 Bacterial urine culture - 17 01:56                









 URINE CULTURE RESULTS                    MORE THAN 3 ISOLATES                 
    NRG                









 Stool occult blood screen - 17 08:00                









 Stool gastrointestinal hemoglobin detection                    POSITIVE       
              NEGATIVE                









 Complete blood count (CBC) with automated white blood cell (WBC) differential 
- 17 12:50                









 Blood leukocytes automated count (number/volume)                    10.2 10*3/
uL                    4.3-11.0                

 

 Blood erythrocytes automated count (number/volume)                    4.17 10*6
/uL                    4.35-5.85                

 

 Venous blood hemoglobin measurement (mass/volume)                    11.0 g/dL
                    11.5-16.0                

 

 Blood hematocrit (volume fraction)                    33 %                    
35-52                

 

 Automated erythrocyte mean corpuscular volume                    80 [foz_us]  
                  80-99                

 

 Automated erythrocyte mean corpuscular hemoglobin (mass per erythrocyte)      
              26 pg                    25-34                

 

 Automated erythrocyte mean corpuscular hemoglobin concentration measurement (
mass/volume)                    33 g/dL                    32-36                

 

 Automated erythrocyte distribution width ratio                    14.1 %      
              10.0-14.5                

 

 Automated blood platelet count (count/volume)                    471 10*3/uL  
                  130-400                

 

 Automated blood platelet mean volume measurement                    10.4 [foz_
us]                    7.4-10.4                

 

 Automated blood neutrophils/100 leukocytes                    83 %            
        42-75                

 

 Automated blood lymphocytes/100 leukocytes                    8 %             
       12-44                

 

 Blood monocytes/100 leukocytes                    8 %                    0-12 
               

 

 Automated blood eosinophils/100 leukocytes                    0 %             
       0-10                

 

 Automated blood basophils/100 leukocytes                    0 %               
     0-10                

 

 Blood neutrophils automated count (number/volume)                    8.5 10*3 
                   1.8-7.8                

 

 Blood lymphocytes automated count (number/volume)                    0.8 10*3 
                   1.0-4.0                

 

 Blood monocytes automated count (number/volume)                    0.9 10*3   
                 0.0-1.0                

 

 Automated eosinophil count                    0.0 10*3/uL                    
0.0-0.3                

 

 Automated blood basophil count (count/volume)                    0.0 10*3/uL  
                  0.0-0.1                









 Comprehensive metabolic panel - 17 12:50                









 Serum or plasma sodium measurement (moles/volume)                    138 mmol/
L                    135-145                

 

 Serum or plasma potassium measurement (moles/volume)                    3.3 
mmol/L                    3.6-5.0                

 

 Serum or plasma chloride measurement (moles/volume)                    107 mmol
/L                                    

 

 Carbon dioxide                    15 mmol/L                    21-32          
      

 

 Serum or plasma anion gap determination (moles/volume)                    16 
mmol/L                    5-14                

 

 Serum or plasma urea nitrogen measurement (mass/volume)                    46 
mg/dL                    7-18                

 

 Serum or plasma creatinine measurement (mass/volume)                    3.52 mg
/dL                    0.60-1.30                

 

 Serum or plasma urea nitrogen/creatinine mass ratio                    13     
                NRG                

 

 Serum or plasma creatinine measurement with calculation of estimated 
glomerular filtration rate                    13                     NRG       
         

 

 Serum or plasma glucose measurement (mass/volume)                    149 mg/dL
                                    

 

 Serum or plasma calcium measurement (mass/volume)                    9.0 mg/dL
                    8.5-10.1                

 

 Serum or plasma total bilirubin measurement (mass/volume)                    
0.3 mg/dL                    0.1-1.0                

 

 Serum or plasma alkaline phosphatase measurement (enzymatic activity/volume)  
                  57 U/L                                    

 

 Serum or plasma aspartate aminotransferase measurement (enzymatic activity/
volume)                    9 U/L                    5-34                

 

 Serum or plasma alanine aminotransferase measurement (enzymatic activity/volume
)                    < U/L                    0-55                

 

 Serum or plasma protein measurement (mass/volume)                    6.7 g/dL 
                   6.4-8.2                

 

 Serum or plasma albumin measurement (mass/volume)                    3.6 g/dL 
                   3.2-4.5                









 Lipase - 17 12:50                









 Lipase                    22 U/L                    8-78                









 Complete urinalysis with reflex to culture - 17 14:05                









 Urine color determination                    YELLOW                     NRG   
             

 

 Urine clarity determination                    SLIGHTLY CLOUDY                
     NRG                

 

 Urine pH measurement by test strip                    5                     5-
9                

 

 Specific gravity of urine by test strip                    1.025              
       1.016-1.022                

 

 Urine protein assay by test strip, semi-quantitative                    2+    
                 NEGATIVE                

 

 Urine glucose detection by automated test strip                    NEGATIVE   
                  NEGATIVE                

 

 Erythrocytes detection in urine sediment by light microscopy                  
  NEGATIVE                     NEGATIVE                

 

 Urine ketones detection by automated test strip                    1+         
            NEGATIVE                

 

 Urine nitrite detection by test strip                    NEGATIVE             
        NEGATIVE                

 

 Urine total bilirubin detection by test strip                    1+           
          NEGATIVE                

 

 Urine urobilinogen measurement by automated test strip (mass/volume)          
          NORMAL                     NORMAL                

 

 Urine leukocyte esterase detection by dipstick                    1+          
           NEGATIVE                

 

 Automated urine sediment erythrocyte count by microscopy (number/high power 
field)                    NONE                     NRG                

 

 Automated urine sediment leukocyte count by microscopy (number/high power field
)                     [HPF]                    NRG                

 

 Bacteria detection in urine sediment by light microscopy                    
FEW                     NRG                

 

 Squamous epithelial cells detection in urine sediment by light microscopy     
               5-10                     NRG                

 

 Crystals detection in urine sediment by light microscopy                    
PRESENT                     NRG                

 

 Casts detection in urine sediment by light microscopy                    
PRESENT                     NRG                

 

 Mucus detection in urine sediment by light microscopy                    
NEGATIVE                     NRG                

 

 Complete urinalysis with reflex to culture                    YES             
        NRG                

 

 Amorphous sediment detection in urine sediment by light microscopy            
        FEW OSWALDO URATES                     NRG                

 

 Hyaline casts detection in urine sediment by light microscopy                 
   10-25                     NRG                









 Bacterial urine culture - 17 14:05                









 Bacterial urine culture                    [id number]                     NRG  
              

 

 COLONY COUNT                    <10,000                     NRG                

 

 [facility];REPORTABLE                    SENSITIVITY REPORTED AT 0926, 17       
              NRG                

 

 URINE CULTURE RESULTS                    PLUS                     NRG         
       

 

 FREE TEXT ENTRY 2                    UNUSUAL RESISTANCE PATTERN DETECTED,     
                NRG                

 

 FREE TEXT ENTRY 3                    ESBL IDENTIFIED.                     NRG 
               









 Bacterial susceptibility panel - 17 14:05                









 Gentamicin susceptibility test by minimum inhibitory concentration            
        <=                    NRG                

 

 Trimethoprim/sulfamethoxazole susceptibility test by minimum 
inhibitoryconcentration                    >=                    NRG           
     

 

 Ampicillin susceptibility test by minimum inhibitory concentration            
        >=                    NRG                

 

 Tobramycin susceptibility test by minimum inhibitory concentration            
        <=                    NRG                

 

 Cefazolin susceptibility test by minimum inhibitory concentration             
       >=                    NRG                

 

 Ceftriaxone susceptibility test by minimum inhibitory concentration           
         >=                    NRG                

 

 Ampicillin/sulbactam susceptibility test by minimum inhibitory concentration  
                  >=                    NRG                

 

 Ciprofloxacin susceptibility test by minimum inhibitory concentration         
           >=                    NRG                

 

 Meropenem susceptibility test by minimum inhibitory concentration             
       <=                    NRG                

 

 Nitrofurantoin susceptibility test by minimum inhibitory concentration        
            <=                    NRG                

 

 Aztreonam susceptibility test by minimum inhibitory concentration             
       R                     NRG                

 

 Extended spectrum beta lactamase (ESBL) producing bacteria susceptibility test 
by minimum inhibitory concentration                    +                     
NRG                









 C DIFFICILE AG + TOXIN A/B. - 17 22:02                









 CALL POSITIVES (F1 HELP)                    CALLED TO PARISA ELIZABETH 17 9:55 
BY JOANNE OSEI                     NRG                

 

 SPECIAL CONTACT                    SPECIAL CONTACT PRECAUTIONS NEEDED         
            NRG                

 

 RESULTS                    POSITIVE FOR ANTIGEN AND TOXIN A/B                 
    NRG                









 Stool bacteria identification by culture - 17 22:10                









 Stool bacteria identification by culture                    N2                
     NRG                









 [id number] - 17 22:10                









 [id number]                    FOOTNOTE                     NRG                









 Complete blood count (CBC) with automated white blood cell (WBC) differential 
- 17 04:48                









 Blood leukocytes automated count (number/volume)                    6.4 10*3/
uL                    4.3-11.0                

 

 Blood erythrocytes automated count (number/volume)                    3.87 10*6
/uL                    4.35-5.85                

 

 Venous blood hemoglobin measurement (mass/volume)                    10.0 g/dL
                    11.5-16.0                

 

 Blood hematocrit (volume fraction)                    31 %                    
35-52                

 

 Automated erythrocyte mean corpuscular volume                    80 [foz_us]  
                  80-99                

 

 Automated erythrocyte mean corpuscular hemoglobin (mass per erythrocyte)      
              26 pg                    25-34                

 

 Automated erythrocyte mean corpuscular hemoglobin concentration measurement (
mass/volume)                    33 g/dL                    32-36                

 

 Automated erythrocyte distribution width ratio                    14.1 %      
              10.0-14.5                

 

 Automated blood platelet count (count/volume)                    344 10*3/uL  
                  130-400                

 

 Automated blood platelet mean volume measurement                    10.2 [foz_
us]                    7.4-10.4                

 

 Automated blood neutrophils/100 leukocytes                    66 %            
        42-75                

 

 Automated blood lymphocytes/100 leukocytes                    19 %            
        12-44                

 

 Blood monocytes/100 leukocytes                    14 %                    0-12
                

 

 Automated blood eosinophils/100 leukocytes                    1 %             
       0-10                

 

 Automated blood basophils/100 leukocytes                    0 %               
     0-10                

 

 Blood neutrophils automated count (number/volume)                    4.2 10*3 
                   1.8-7.8                

 

 Blood lymphocytes automated count (number/volume)                    1.2 10*3 
                   1.0-4.0                

 

 Blood monocytes automated count (number/volume)                    0.9 10*3   
                 0.0-1.0                

 

 Automated eosinophil count                    0.1 10*3/uL                    
0.0-0.3                

 

 Automated blood basophil count (count/volume)                    0.0 10*3/uL  
                  0.0-0.1                









 Comprehensive metabolic panel - 17 04:48                









 Serum or plasma sodium measurement (moles/volume)                    141 mmol/
L                    135-145                

 

 Serum or plasma potassium measurement (moles/volume)                    3.3 
mmol/L                    3.6-5.0                

 

 Serum or plasma chloride measurement (moles/volume)                    116 mmol
/L                                    

 

 Carbon dioxide                    13 mmol/L                    21-32          
      

 

 Serum or plasma anion gap determination (moles/volume)                    12 
mmol/L                    5-14                

 

 Serum or plasma urea nitrogen measurement (mass/volume)                    36 
mg/dL                    7-18                

 

 Serum or plasma creatinine measurement (mass/volume)                    2.06 mg
/dL                    0.60-1.30                

 

 Serum or plasma urea nitrogen/creatinine mass ratio                    17     
                NRG                

 

 Serum or plasma creatinine measurement with calculation of estimated 
glomerular filtration rate                    24                     NRG       
         

 

 Serum or plasma glucose measurement (mass/volume)                    85 mg/dL 
                                   

 

 Serum or plasma calcium measurement (mass/volume)                    8.2 mg/dL
                    8.5-10.1                

 

 Serum or plasma total bilirubin measurement (mass/volume)                    
0.3 mg/dL                    0.1-1.0                

 

 Serum or plasma alkaline phosphatase measurement (enzymatic activity/volume)  
                  46 U/L                                    

 

 Serum or plasma aspartate aminotransferase measurement (enzymatic activity/
volume)                    7 U/L                    5-34                

 

 Serum or plasma alanine aminotransferase measurement (enzymatic activity/volume
)                    < U/L                    0-55                

 

 Serum or plasma protein measurement (mass/volume)                    5.6 g/dL 
                   6.4-8.2                

 

 Serum or plasma albumin measurement (mass/volume)                    2.9 g/dL 
                   3.2-4.5                









 Automated blood complete blood count (hemogram) panel - 17 06:03        
        









 Blood leukocytes automated count (number/volume)                    8.0 10*3/
uL                    4.3-11.0                

 

 Blood erythrocytes automated count (number/volume)                    3.62 10*6
/uL                    4.35-5.85                

 

 Venous blood hemoglobin measurement (mass/volume)                    9.4 g/dL 
                   11.5-16.0                

 

 Blood hematocrit (volume fraction)                    29 %                    
35-52                

 

 Automated erythrocyte mean corpuscular volume                    80 [foz_us]  
                  80-99                

 

 Automated erythrocyte mean corpuscular hemoglobin (mass per erythrocyte)      
              26 pg                    25-34                

 

 Automated erythrocyte mean corpuscular hemoglobin concentration measurement (
mass/volume)                    33 g/dL                    32-36                

 

 Automated erythrocyte distribution width ratio                    14.2 %      
              10.0-14.5                

 

 Automated blood platelet count (count/volume)                    441 10*3/uL  
                  130-400                

 

 Automated blood platelet mean volume measurement                    10.2 [foz_
us]                    7.4-10.4                









 Comprehensive metabolic panel - 17 06:03                









 Serum or plasma sodium measurement (moles/volume)                    141 mmol/
L                    135-145                

 

 Serum or plasma potassium measurement (moles/volume)                    3.2 
mmol/L                    3.6-5.0                

 

 Serum or plasma chloride measurement (moles/volume)                    115 mmol
/L                                    

 

 Carbon dioxide                    17 mmol/L                    21-32          
      

 

 Serum or plasma anion gap determination (moles/volume)                    9 
mmol/L                    5-14                

 

 Serum or plasma urea nitrogen measurement (mass/volume)                    18 
mg/dL                    7-18                

 

 Serum or plasma creatinine measurement (mass/volume)                    1.02 mg
/dL                    0.60-1.30                

 

 Serum or plasma urea nitrogen/creatinine mass ratio                    18     
                NRG                

 

 Serum or plasma creatinine measurement with calculation of estimated 
glomerular filtration rate                    54                     NRG       
         

 

 Serum or plasma glucose measurement (mass/volume)                    140 mg/dL
                                    

 

 Serum or plasma calcium measurement (mass/volume)                    8.2 mg/dL
                    8.5-10.1                

 

 Serum or plasma total bilirubin measurement (mass/volume)                    
0.3 mg/dL                    0.1-1.0                

 

 Serum or plasma alkaline phosphatase measurement (enzymatic activity/volume)  
                  45 U/L                                    

 

 Serum or plasma aspartate aminotransferase measurement (enzymatic activity/
volume)                    9 U/L                    5-34                

 

 Serum or plasma alanine aminotransferase measurement (enzymatic activity/volume
)                    < U/L                    0-55                

 

 Serum or plasma protein measurement (mass/volume)                    5.5 g/dL 
                   6.4-8.2                

 

 Serum or plasma albumin measurement (mass/volume)                    2.8 g/dL 
                   3.2-4.5                









 Automated blood complete blood count (hemogram) panel - 17 06:25        
        









 Blood leukocytes automated count (number/volume)                    8.3 10*3/
uL                    4.3-11.0                

 

 Blood erythrocytes automated count (number/volume)                    3.89 10*6
/uL                    4.35-5.85                

 

 Venous blood hemoglobin measurement (mass/volume)                    10.1 g/dL
                    11.5-16.0                

 

 Blood hematocrit (volume fraction)                    31 %                    
35-52                

 

 Automated erythrocyte mean corpuscular volume                    79 [foz_us]  
                  80-99                

 

 Automated erythrocyte mean corpuscular hemoglobin (mass per erythrocyte)      
              26 pg                    25-34                

 

 Automated erythrocyte mean corpuscular hemoglobin concentration measurement (
mass/volume)                    33 g/dL                    32-36                

 

 Automated erythrocyte distribution width ratio                    14.2 %      
              10.0-14.5                

 

 Automated blood platelet count (count/volume)                    446 10*3/uL  
                  130-400                

 

 Automated blood platelet mean volume measurement                    10.0 [foz_
us]                    7.4-10.4                









 Comprehensive metabolic panel - 17 06:25                









 Serum or plasma sodium measurement (moles/volume)                    140 mmol/
L                    135-145                

 

 Serum or plasma potassium measurement (moles/volume)                    3.3 
mmol/L                    3.6-5.0                

 

 Serum or plasma chloride measurement (moles/volume)                    112 mmol
/L                                    

 

 Carbon dioxide                    18 mmol/L                    21-32          
      

 

 Serum or plasma anion gap determination (moles/volume)                    10 
mmol/L                    5-14                

 

 Serum or plasma urea nitrogen measurement (mass/volume)                    6 mg
/dL                    7-18                

 

 Serum or plasma creatinine measurement (mass/volume)                    0.70 mg
/dL                    0.60-1.30                

 

 Serum or plasma urea nitrogen/creatinine mass ratio                    9      
               NRG                

 

 Serum or plasma creatinine measurement with calculation of estimated 
glomerular filtration rate                    >                     NRG        
        

 

 Serum or plasma glucose measurement (mass/volume)                    95 mg/dL 
                                   

 

 Serum or plasma calcium measurement (mass/volume)                    8.0 mg/dL
                    8.5-10.1                

 

 Serum or plasma total bilirubin measurement (mass/volume)                    
0.3 mg/dL                    0.1-1.0                

 

 Serum or plasma alkaline phosphatase measurement (enzymatic activity/volume)  
                  49 U/L                                    

 

 Serum or plasma aspartate aminotransferase measurement (enzymatic activity/
volume)                    10 U/L                    5-34                

 

 Serum or plasma alanine aminotransferase measurement (enzymatic activity/volume
)                    < U/L                    0-55                

 

 Serum or plasma protein measurement (mass/volume)                    5.6 g/dL 
                   6.4-8.2                

 

 Serum or plasma albumin measurement (mass/volume)                    3.0 g/dL 
                   3.2-4.5                









 Complete blood count (CBC) with automated white blood cell (WBC) differential 
- 17 04:54                









 Blood leukocytes automated count (number/volume)                    8.0 10*3/
uL                    4.3-11.0                

 

 Blood erythrocytes automated count (number/volume)                    3.71 10*6
/uL                    4.35-5.85                

 

 Venous blood hemoglobin measurement (mass/volume)                    9.7 g/dL 
                   11.5-16.0                

 

 Blood hematocrit (volume fraction)                    29 %                    
35-52                

 

 Automated erythrocyte mean corpuscular volume                    79 [foz_us]  
                  80-99                

 

 Automated erythrocyte mean corpuscular hemoglobin (mass per erythrocyte)      
              26 pg                    25-34                

 

 Automated erythrocyte mean corpuscular hemoglobin concentration measurement (
mass/volume)                    33 g/dL                    32-36                

 

 Automated erythrocyte distribution width ratio                    14.3 %      
              10.0-14.5                

 

 Automated blood platelet count (count/volume)                    433 10*3/uL  
                  130-400                

 

 Automated blood platelet mean volume measurement                    10.3 [foz_
us]                    7.4-10.4                

 

 Automated blood neutrophils/100 leukocytes                    63 %            
        42-75                

 

 Automated blood lymphocytes/100 leukocytes                    24 %            
        12-44                

 

 Blood monocytes/100 leukocytes                    12 %                    0-12
                

 

 Automated blood eosinophils/100 leukocytes                    1 %             
       0-10                

 

 Automated blood basophils/100 leukocytes                    0 %               
     0-10                

 

 Blood neutrophils automated count (number/volume)                    5.1 10*3 
                   1.8-7.8                

 

 Blood lymphocytes automated count (number/volume)                    1.9 10*3 
                   1.0-4.0                

 

 Blood monocytes automated count (number/volume)                    0.9 10*3   
                 0.0-1.0                

 

 Automated eosinophil count                    0.1 10*3/uL                    
0.0-0.3                

 

 Automated blood basophil count (count/volume)                    0.0 10*3/uL  
                  0.0-0.1                









 Serum or plasma renal function panel (Na, K, Cl, CO2, BUN, Cr, glucose,Ca, phos
, alb) - 17 04:54                









 Serum or plasma sodium measurement (moles/volume)                    139 mmol/
L                    135-145                

 

 Serum or plasma potassium measurement (moles/volume)                    3.3 
mmol/L                    3.6-5.0                

 

 Serum or plasma chloride measurement (moles/volume)                    112 mmol
/L                                    

 

 Carbon dioxide                    18 mmol/L                    21-32          
      

 

 Serum or plasma anion gap determination (moles/volume)                    9 
mmol/L                    5-14                

 

 Serum or plasma urea nitrogen measurement (mass/volume)                    4 mg
/dL                    7-18                

 

 Serum or plasma creatinine measurement (mass/volume)                    0.65 mg
/dL                    0.60-1.30                

 

 Serum or plasma urea nitrogen/creatinine mass ratio                    6      
               NRG                

 

 Serum or plasma creatinine measurement with calculation of estimated 
glomerular filtration rate                    >                     NRG        
        

 

 Serum or plasma glucose measurement (mass/volume)                    90 mg/dL 
                                   

 

 Serum or plasma calcium measurement (mass/volume)                    7.4 mg/dL
                    8.5-10.1                

 

 Serum or plasma albumin measurement (mass/volume)                    2.6 g/dL 
                   3.2-4.5                

 

 Serum or plasma phosphate measurement (mass/volume)                    1.5 mg/
dL                    2.3-4.7                









 Magnesium - 17 04:54                









 Magnesium                    0.8 mg/dL                    1.8-2.4             
   









 Magnesium - 17 11:55                









 Magnesium                    1.5 mg/dL                    1.8-2.4             
   









 Clostridium difficile detection - 17 18:23                









 C DIFF MOLECULAR RESULT                    Negative for toxigenic C diff by 
DNA amplification                     NRG                









 Stool occult blood screen - 17 18:23                









 Stool gastrointestinal hemoglobin detection                    NEGATIVE       
              NEGATIVE                









 Stool bacteria identification by culture - 17 18:23                









 Stool bacteria identification by culture                    N2                
     NRG                









 Complete urinalysis with reflex to culture - 17 22:30                









 Urine color determination                    BROWN                     NRG    
            

 

 Urine clarity determination                    VERY CLOUDY                     
NRG                

 

 Urine pH measurement by test strip                    5                     5-
9                

 

 Specific gravity of urine by test strip                    1.015              
       1.016-1.022                

 

 Urine protein assay by test strip, semi-quantitative                    2+    
                 NEGATIVE                

 

 Urine glucose detection by automated test strip                    NEGATIVE   
                  NEGATIVE                

 

 Erythrocytes detection in urine sediment by light microscopy                  
  2+                     NEGATIVE                

 

 Urine ketones detection by automated test strip                    NEGATIVE   
                  NEGATIVE                

 

 Urine nitrite detection by test strip                    POSITIVE             
        NEGATIVE                

 

 Urine total bilirubin detection by test strip                    NEGATIVE     
                NEGATIVE                

 

 Urine urobilinogen measurement by automated test strip (mass/volume)          
          NORMAL                     NORMAL                

 

 Urine leukocyte esterase detection by dipstick                    3+          
           NEGATIVE                

 

 Automated urine sediment erythrocyte count by microscopy (number/high power 
field)                    NONE                     NRG                

 

 Automated urine sediment leukocyte count by microscopy (number/high power field
)                    TNTC                     NRG                

 

 Bacteria detection in urine sediment by light microscopy                    
LARGE                     NRG                

 

 Crystals detection in urine sediment by light microscopy                    
NONE                     NRG                

 

 Casts detection in urine sediment by light microscopy                    NONE 
                    NRG                

 

 Mucus detection in urine sediment by light microscopy                    SMALL
                     NRG                

 

 Complete urinalysis with reflex to culture                    YES             
        NRG                

 

 Renal epithelial cells detection in urine sediment by light microscopy        
            NONE                     NRG                









 Bacterial urine culture - 17 22:30                









 Bacterial urine culture                    SEE COMMEN                     NRG 
               

 

 COLONY COUNT                    .                     NRG                

 

 [facility];REPORTABLE                    SENSITIVITY REPORTED 6/20 09:00             
        NRG                









 Bacterial susceptibility panel - 17 22:30                









 Gentamicin susceptibility test by minimum inhibitory concentration            
        <=                    NRG                

 

 Trimethoprim/sulfamethoxazole susceptibility test by minimum 
inhibitoryconcentration                    <=                    NRG           
     

 

 Ampicillin susceptibility test by minimum inhibitory concentration            
        >=                    NRG                

 

 Tobramycin susceptibility test by minimum inhibitory concentration            
        <=                    NRG                

 

 Cefazolin susceptibility test by minimum inhibitory concentration             
       16                     NRG                

 

 Ceftriaxone susceptibility test by minimum inhibitory concentration           
         <=                    NRG                

 

 Ampicillin/sulbactam susceptibility test by minimum inhibitory concentration  
                  >=                    NRG                

 

 Piperacillin/tazobactam susceptibility test by minimum inhibitory 
concentration                    <=                    NRG                

 

 Ciprofloxacin susceptibility test by minimum inhibitory concentration         
           >=                    NRG                

 

 Meropenem susceptibility test by minimum inhibitory concentration             
       <=                    NRG                

 

 Nitrofurantoin susceptibility test by minimum inhibitory concentration        
            128                     NRG                

 

 Aztreonam susceptibility test by minimum inhibitory concentration             
       <=                    NRG                

 

 Extended spectrum beta lactamase (ESBL) producing bacteria susceptibility test 
by minimum inhibitory concentration                    -                     
NRG                









 Bacterial susceptibility panel - 17 22:30                









 Gentamicin susceptibility test by minimum inhibitory concentration            
        R                     NRG                

 

 Vancomycin susceptibility test by minimum inhibitory concentration            
        >=                    NRG                

 

 Levofloxacin susceptibility test by minimum inhibitory concentration          
          1                     NRG                

 

 Tetracycline susceptibility test by minimum inhibitory concentration          
          >=                    NRG                

 

 Ampicillin susceptibility test by minimum inhibitory concentration            
        <=                    NRG                

 

 Nitrofurantoin susceptibility test by minimum inhibitory concentration        
            <=                    NRG                









 Capillary blood glucose measurement by glucometer (mass/volume) - 17 14:
32                









 Capillary blood glucose measurement by glucometer (mass/volume)               
     335 mg/dL                                    









 Complete blood count (CBC) with automated white blood cell (WBC) differential 
- 17 15:45                









 Blood leukocytes automated count (number/volume)                    13.0 10*3/
uL                    4.3-11.0                

 

 Blood erythrocytes automated count (number/volume)                    3.77 10*6
/uL                    4.35-5.85                

 

 Venous blood hemoglobin measurement (mass/volume)                    10.1 g/dL
                    11.5-16.0                

 

 Blood hematocrit (volume fraction)                    31 %                    
35-52                

 

 Automated erythrocyte mean corpuscular volume                    81 [foz_us]  
                  80-99                

 

 Automated erythrocyte mean corpuscular hemoglobin (mass per erythrocyte)      
              27 pg                    25-34                

 

 Automated erythrocyte mean corpuscular hemoglobin concentration measurement (
mass/volume)                    33 g/dL                    32-36                

 

 Automated erythrocyte distribution width ratio                    18.4 %      
              10.0-14.5                

 

 Automated blood platelet count (count/volume)                    407 10*3/uL  
                  130-400                

 

 Automated blood platelet mean volume measurement                    11.1 [foz_
us]                    7.4-10.4                

 

 Automated blood neutrophils/100 leukocytes                    81 %            
        42-75                

 

 Automated blood lymphocytes/100 leukocytes                    13 %            
        12-44                

 

 Blood monocytes/100 leukocytes                    6 %                    0-12 
               

 

 Automated blood eosinophils/100 leukocytes                    1 %             
       0-10                

 

 Automated blood basophils/100 leukocytes                    0 %               
     0-10                

 

 Blood neutrophils automated count (number/volume)                    10.5 10*3
                    1.8-7.8                

 

 Blood lymphocytes automated count (number/volume)                    1.7 10*3 
                   1.0-4.0                

 

 Blood monocytes automated count (number/volume)                    0.8 10*3   
                 0.0-1.0                

 

 Automated eosinophil count                    0.1 10*3/uL                    
0.0-0.3                

 

 Automated blood basophil count (count/volume)                    0.0 10*3/uL  
                  0.0-0.1                









 PT panel in platelet poor plasma by coagulation assay - 17 15:45        
        









 Prothrombin time (PT) in platelet poor plasma by coagulation assay            
        17.1 s                    12.2-14.7                

 

 INR in platelet poor plasma or blood by coagulation assay                    
1.4                     0.8-1.4                









 Activated partial thromboplastin time (aPTT) in platelet poor plasma 
bycoagulation assay - 17 15:45                









 Activated partial thromboplastin time (aPTT) in platelet poor plasma 
bycoagulation assay                    26 s                    24-35           
     









 Comprehensive metabolic panel - 17 15:45                









 Serum or plasma sodium measurement (moles/volume)                    135 mmol/
L                    135-145                

 

 Serum or plasma potassium measurement (moles/volume)                    4.1 
mmol/L                    3.6-5.0                

 

 Serum or plasma chloride measurement (moles/volume)                    110 mmol
/L                                    

 

 Carbon dioxide                    18 mmol/L                    21-32          
      

 

 Serum or plasma anion gap determination (moles/volume)                    7 
mmol/L                    5-14                

 

 Serum or plasma urea nitrogen measurement (mass/volume)                    28 
mg/dL                    7-18                

 

 Serum or plasma creatinine measurement (mass/volume)                    2.37 mg
/dL                    0.60-1.30                

 

 Serum or plasma urea nitrogen/creatinine mass ratio                    12     
                NRG                

 

 Serum or plasma creatinine measurement with calculation of estimated 
glomerular filtration rate                    20                     NRG       
         

 

 Serum or plasma glucose measurement (mass/volume)                    317 mg/dL
                                    

 

 Serum or plasma calcium measurement (mass/volume)                    8.3 mg/dL
                    8.5-10.1                

 

 Serum or plasma total bilirubin measurement (mass/volume)                    
0.2 mg/dL                    0.1-1.0                

 

 Serum or plasma alkaline phosphatase measurement (enzymatic activity/volume)  
                  58 U/L                                    

 

 Serum or plasma aspartate aminotransferase measurement (enzymatic activity/
volume)                    16 U/L                    5-34                

 

 Serum or plasma alanine aminotransferase measurement (enzymatic activity/volume
)                    8 U/L                    0-55                

 

 Serum or plasma protein measurement (mass/volume)                    6.3 g/dL 
                   6.4-8.2                

 

 Serum or plasma albumin measurement (mass/volume)                    3.0 g/dL 
                   3.2-4.5                









 Magnesium - 17 15:45                









 Magnesium                    2.0 mg/dL                    1.8-2.4             
   









 Serum or plasma ethanol measurement (mass/volume) - 17 15:45            
    









 Serum or plasma ethanol measurement (mass/volume)                    < mg/dL  
                  <10                









 Urine drug screening test - 17 17:17                









 Urine phencyclidine detection by screening method                    NEGATIVE 
                    NEGATIVE                

 

 Urine benzodiazepines detection by screening method                    
NEGATIVE                     NEGATIVE                

 

 Urine cocaine detection                    NEGATIVE                     
NEGATIVE                

 

 Urine amphetamines detection by screening method                    NEGATIVE  
                   NEGATIVE                

 

 Urine methamphetamine detection by screening method                    
NEGATIVE                     NEGATIVE                

 

 Urine cannabinoids detection by screening method                    NEGATIVE  
                   NEGATIVE                

 

 Urine opiates detection by screening method                    NEGATIVE       
              NEGATIVE                

 

 Urine barbiturates detection                    NEGATIVE                     
NEGATIVE                

 

 Screening urine tricyclic antidepressants detection                    
NEGATIVE                     NEGATIVE                

 

 Urine methadone detection by screening method                    NEGATIVE     
                NEGATIVE                

 

 Urine oxycodone detection                    NEGATIVE                     
NEGATIVE                

 

 Urine propoxyphene detection                    NEGATIVE                     
NEGATIVE                









 Complete urinalysis with reflex to culture - 17 17:17                









 Urine color determination                    ESTEFANY                     NRG    
            

 

 Urine clarity determination                    SLIGHTLY CLOUDY                
     NRG                

 

 Urine pH measurement by test strip                    5                     5-
9                

 

 Specific gravity of urine by test strip                    1.020              
       1.016-1.022                

 

 Urine protein assay by test strip, semi-quantitative                    2+    
                 NEGATIVE                

 

 Urine glucose detection by automated test strip                    NEGATIVE   
                  NEGATIVE                

 

 Erythrocytes detection in urine sediment by light microscopy                  
  NEGATIVE                     NEGATIVE                

 

 Urine ketones detection by automated test strip                    NEGATIVE   
                  NEGATIVE                

 

 Urine nitrite detection by test strip                    POSITIVE             
        NEGATIVE                

 

 Urine total bilirubin detection by test strip                    NEGATIVE     
                NEGATIVE                

 

 Urine urobilinogen measurement by automated test strip (mass/volume)          
          NORMAL                     NORMAL                

 

 Urine leukocyte esterase detection by dipstick                    3+          
           NEGATIVE                

 

 Automated urine sediment erythrocyte count by microscopy (number/high power 
field)                    NONE                     NRG                

 

 Automated urine sediment leukocyte count by microscopy (number/high power field
)                     [HPF]                    NRG                

 

 Bacteria detection in urine sediment by light microscopy                    
MODERATE                     NRG                

 

 Squamous epithelial cells detection in urine sediment by light microscopy     
               5-10                     NRG                

 

 Crystals detection in urine sediment by light microscopy                    
NONE                     NRG                

 

 Casts detection in urine sediment by light microscopy                    NONE 
                    NRG                

 

 Mucus detection in urine sediment by light microscopy                    
NEGATIVE                     NRG                

 

 Complete urinalysis with reflex to culture                    YES             
        NRG                









 Bacterial urine culture - 17 17:17                









 Bacterial urine culture                    [id number]                     NRG  
              

 

 COLONY COUNT                    >100,000/ML                     NRG           
     

 

 [facility];REPORTABLE                    SENSITIVITY REPORTED 17 7:35            
         NRG                









 Bacterial susceptibility panel - 17 17:17                









 Gentamicin susceptibility test by minimum inhibitory concentration            
        >=                    NRG                

 

 Trimethoprim/sulfamethoxazole susceptibility test by minimum 
inhibitoryconcentration                    >=                    NRG           
     

 

 Ampicillin susceptibility test by minimum inhibitory concentration            
        >=                    NRG                

 

 Tobramycin susceptibility test by minimum inhibitory concentration            
        8                     NRG                

 

 Cefazolin susceptibility test by minimum inhibitory concentration             
       <=                    NRG                

 

 Ceftriaxone susceptibility test by minimum inhibitory concentration           
         <=                    NRG                

 

 Ampicillin/sulbactam susceptibility test by minimum inhibitory concentration  
                  >=                    NRG                

 

 Piperacillin/tazobactam susceptibility test by minimum inhibitory 
concentration                    <=                    NRG                

 

 Ciprofloxacin susceptibility test by minimum inhibitory concentration         
           >=                    NRG                

 

 Meropenem susceptibility test by minimum inhibitory concentration             
       <=                    NRG                

 

 Nitrofurantoin susceptibility test by minimum inhibitory concentration        
            128                     NRG                

 

 Aztreonam susceptibility test by minimum inhibitory concentration             
       <=                    NRG                

 

 Extended spectrum beta lactamase (ESBL) producing bacteria susceptibility test 
by minimum inhibitory concentration                    -                     
NRG                

 

 Amikacin susceptibility test by minimum inhibitory concentration              
      S                     NRG                









 Capillary blood glucose measurement by glucometer (mass/volume) - 17 19:
59                









 Capillary blood glucose measurement by glucometer (mass/volume)               
     200 mg/dL                                    









 Capillary blood glucose measurement by glucometer (mass/volume) - 17 05:
07                









 Capillary blood glucose measurement by glucometer (mass/volume)               
     161 mg/dL                                    









 Complete blood count (CBC) with automated white blood cell (WBC) differential 
- 17 08:09                









 Blood leukocytes automated count (number/volume)                    10.2 10*3/
uL                    4.3-11.0                

 

 Blood erythrocytes automated count (number/volume)                    3.55 10*6
/uL                    4.35-5.85                

 

 Venous blood hemoglobin measurement (mass/volume)                    9.3 g/dL 
                   11.5-16.0                

 

 Blood hematocrit (volume fraction)                    29 %                    
35-52                

 

 Automated erythrocyte mean corpuscular volume                    82 [foz_us]  
                  80-99                

 

 Automated erythrocyte mean corpuscular hemoglobin (mass per erythrocyte)      
              26 pg                    25-34                

 

 Automated erythrocyte mean corpuscular hemoglobin concentration measurement (
mass/volume)                    32 g/dL                    32-36                

 

 Automated erythrocyte distribution width ratio                    18.6 %      
              10.0-14.5                

 

 Automated blood platelet count (count/volume)                    422 10*3/uL  
                  130-400                

 

 Automated blood platelet mean volume measurement                    10.6 [foz_
us]                    7.4-10.4                

 

 Automated blood neutrophils/100 leukocytes                    77 %            
        42-75                

 

 Automated blood lymphocytes/100 leukocytes                    16 %            
        12-44                

 

 Blood monocytes/100 leukocytes                    6 %                    0-12 
               

 

 Automated blood eosinophils/100 leukocytes                    1 %             
       0-10                

 

 Automated blood basophils/100 leukocytes                    0 %               
     0-10                

 

 Blood neutrophils automated count (number/volume)                    7.9 10*3 
                   1.8-7.8                

 

 Blood lymphocytes automated count (number/volume)                    1.6 10*3 
                   1.0-4.0                

 

 Blood monocytes automated count (number/volume)                    0.6 10*3   
                 0.0-1.0                

 

 Automated eosinophil count                    0.1 10*3/uL                    
0.0-0.3                

 

 Automated blood basophil count (count/volume)                    0.0 10*3/uL  
                  0.0-0.1                









 Comprehensive metabolic panel - 17 08:09                









 Serum or plasma sodium measurement (moles/volume)                    138 mmol/
L                    135-145                

 

 Serum or plasma potassium measurement (moles/volume)                    4.0 
mmol/L                    3.6-5.0                

 

 Serum or plasma chloride measurement (moles/volume)                    113 mmol
/L                                    

 

 Carbon dioxide                    17 mmol/L                    21-32          
      

 

 Serum or plasma anion gap determination (moles/volume)                    8 
mmol/L                    5-14                

 

 Serum or plasma urea nitrogen measurement (mass/volume)                    28 
mg/dL                    7-18                

 

 Serum or plasma creatinine measurement (mass/volume)                    2.19 mg
/dL                    0.60-1.30                

 

 Serum or plasma urea nitrogen/creatinine mass ratio                    13     
                NRG                

 

 Serum or plasma creatinine measurement with calculation of estimated 
glomerular filtration rate                    22                     NRG       
         

 

 Serum or plasma glucose measurement (mass/volume)                    155 mg/dL
                                    

 

 Serum or plasma calcium measurement (mass/volume)                    8.7 mg/dL
                    8.5-10.1                

 

 Serum or plasma total bilirubin measurement (mass/volume)                    
0.4 mg/dL                    0.1-1.0                

 

 Serum or plasma alkaline phosphatase measurement (enzymatic activity/volume)  
                  56 U/L                                    

 

 Serum or plasma aspartate aminotransferase measurement (enzymatic activity/
volume)                    10 U/L                    5-34                

 

 Serum or plasma alanine aminotransferase measurement (enzymatic activity/volume
)                    10 U/L                    0-55                

 

 Serum or plasma protein measurement (mass/volume)                    6.4 g/dL 
                   6.4-8.2                

 

 Serum or plasma albumin measurement (mass/volume)                    3.2 g/dL 
                   3.2-4.5                









 Magnesium - 17 08:09                









 Magnesium                    1.5 mg/dL                    1.8-2.4             
   









 Capillary blood glucose measurement by glucometer (mass/volume) - 17 12:
01                









 Capillary blood glucose measurement by glucometer (mass/volume)               
     186 mg/dL                                    









 Capillary blood glucose measurement by glucometer (mass/volume) - 17 16:
47                









 Capillary blood glucose measurement by glucometer (mass/volume)               
     209 mg/dL                                    









 Capillary blood glucose measurement by glucometer (mass/volume) - 17 20:
57                









 Capillary blood glucose measurement by glucometer (mass/volume)               
     120 mg/dL                                    









 Capillary blood glucose measurement by glucometer (mass/volume) - 17 05:
21                









 Capillary blood glucose measurement by glucometer (mass/volume)               
     121 mg/dL                                    









 Complete blood count (CBC) with automated white blood cell (WBC) differential 
- 17 06:33                









 Blood leukocytes automated count (number/volume)                    13.5 10*3/
uL                    4.3-11.0                

 

 Blood erythrocytes automated count (number/volume)                    3.85 10*6
/uL                    4.35-5.85                

 

 Venous blood hemoglobin measurement (mass/volume)                    10.2 g/dL
                    11.5-16.0                

 

 Blood hematocrit (volume fraction)                    32 %                    
35-52                

 

 Automated erythrocyte mean corpuscular volume                    83 [foz_us]  
                  80-99                

 

 Automated erythrocyte mean corpuscular hemoglobin (mass per erythrocyte)      
              27 pg                    25-34                

 

 Automated erythrocyte mean corpuscular hemoglobin concentration measurement (
mass/volume)                    32 g/dL                    32-36                

 

 Automated erythrocyte distribution width ratio                    19.4 %      
              10.0-14.5                

 

 Automated blood platelet count (count/volume)                    444 10*3/uL  
                  130-400                

 

 Automated blood platelet mean volume measurement                    10.9 [foz_
us]                    7.4-10.4                

 

 Automated blood neutrophils/100 leukocytes                    76 %            
        42-75                

 

 Automated blood lymphocytes/100 leukocytes                    16 %            
        12-44                

 

 Blood monocytes/100 leukocytes                    7 %                    0-12 
               

 

 Automated blood eosinophils/100 leukocytes                    1 %             
       0-10                

 

 Automated blood basophils/100 leukocytes                    0 %               
     0-10                

 

 Blood neutrophils automated count (number/volume)                    10.3 10*3
                    1.8-7.8                

 

 Blood lymphocytes automated count (number/volume)                    2.1 10*3 
                   1.0-4.0                

 

 Blood monocytes automated count (number/volume)                    1.0 10*3   
                 0.0-1.0                

 

 Automated eosinophil count                    0.1 10*3/uL                    
0.0-0.3                

 

 Automated blood basophil count (count/volume)                    0.0 10*3/uL  
                  0.0-0.1                









 Comprehensive metabolic panel - 17 06:33                









 Serum or plasma sodium measurement (moles/volume)                    140 mmol/
L                    135-145                

 

 Serum or plasma potassium measurement (moles/volume)                    4.2 
mmol/L                    3.6-5.0                

 

 Serum or plasma chloride measurement (moles/volume)                    116 mmol
/L                                    

 

 Carbon dioxide                    11 mmol/L                    21-32          
      

 

 Serum or plasma anion gap determination (moles/volume)                    13 
mmol/L                    5-14                

 

 Serum or plasma urea nitrogen measurement (mass/volume)                    26 
mg/dL                    7-18                

 

 Serum or plasma creatinine measurement (mass/volume)                    2.02 mg
/dL                    0.60-1.30                

 

 Serum or plasma urea nitrogen/creatinine mass ratio                    13     
                NRG                

 

 Serum or plasma creatinine measurement with calculation of estimated 
glomerular filtration rate                    24                     NRG       
         

 

 Serum or plasma glucose measurement (mass/volume)                    142 mg/dL
                                    

 

 Serum or plasma calcium measurement (mass/volume)                    8.6 mg/dL
                    8.5-10.1                

 

 Serum or plasma total bilirubin measurement (mass/volume)                    
0.3 mg/dL                    0.1-1.0                

 

 Serum or plasma alkaline phosphatase measurement (enzymatic activity/volume)  
                  60 U/L                                    

 

 Serum or plasma aspartate aminotransferase measurement (enzymatic activity/
volume)                    13 U/L                    5-34                

 

 Serum or plasma alanine aminotransferase measurement (enzymatic activity/volume
)                    9 U/L                    0-55                

 

 Serum or plasma protein measurement (mass/volume)                    6.4 g/dL 
                   6.4-8.2                

 

 Serum or plasma albumin measurement (mass/volume)                    3.1 g/dL 
                   3.2-4.5                









 Capillary blood glucose measurement by glucometer (mass/volume) - 17 10:
11                









 Capillary blood glucose measurement by glucometer (mass/volume)               
     153 mg/dL                                    









 Capillary blood glucose measurement by glucometer (mass/volume) - 17 15:
41                









 Capillary blood glucose measurement by glucometer (mass/volume)               
     147 mg/dL                                    









 Capillary blood glucose measurement by glucometer (mass/volume) - 17 19:
45                









 Capillary blood glucose measurement by glucometer (mass/volume)               
     236 mg/dL                                    



                                                                               
                                                                               
                                                                               
                                                                               
                                                                               
                                                                               
                                                                               
                                                                 



Encounters

                      





 ACCT No.                    Visit Date/Time                    Discharge      
              Status                    Pt. Type                    Provider   
                 Facility                    Loc./Unit                    
Complaint                

 

 [id number]                    2017 15:20:00                    2017 15:40:00                    DIS                    Inpatient              
      RODY MOSER DO                    [facility]                    4TH                    ACUTE RENAL FAILURE,
[place]                

 

 [id number]                    2017 09:20:00                    2017 12:48:00                    DIS                    Emergency              
      CESAR MILLS MD                    [facility]   
                 ER                    FALL                

 

 [id number]                    2017 10:23:00                    2017 12:30:00                    DIS                    Emergency              
      VICTORINA LUNSFORD DO                    [facility]      
              ER                    FALL/HEAD INJURY                

 

 [id number]                    2015 16:11:00                    2015 18:03:00                    DIS                    Emergency              
      COLLEEN HUGHES                    [facility]
                    ER                    R HIP PAIN                

 

 [id number]                    2014 15:35:00                    2014 23:59:59                    CLS                    Outpatient             
       RODY MOSER DO                    [facility]                    RAD                    NECK PAIN                

 

 [id number]                    2014 14:43:00                    2014 16:32:00                    DIS                    Emergency              
      VICTORINA LUNSFORD DO                    [facility]      
              ER                    BACK/LEFT HIP PAIN                

 

 [id number]                    08/10/2013 01:41:00                    08/10/
2013 02:23:00                    DIS                    Emergency              
      RAMONA BOWEN MD                    [facility]   
                 ER                    INJ AT HOME                

 

 [id number]                    2017 17:30:00                           
              ACT                    Inpatient                    RODY MOSER DO                    [facility]                  
  4TH                    ACUTE RENAL FAILURE,FALL                

 

 [id number]                    2017 22:30:00                           
              ACT                    Outpatient                    RODY MOESR DO                    [facility]                
    LABNPT                    N19.5                

 

 [id number]                    2017 20:15:00                           
              ACT                    Outpatient                    RODY MOSER DO                    [facility]                
    LABNPT                    R19.7                

 

 [id number]                    2017 15:07:00                           
              ACT                    Outpatient                    RODY MOSER DO                    [facility]                
    LABNPT                    K59.00, E78.4, E56.9                

 

 [id number]                    2017 23:44:00                           
              ACT                    Outpatient                    RODY MOSER DO                    [facility]                
    LABNPT                    R39.9                

 

 [id number]                    2015 16:11:00                           
                                   Document Registration                       
                                                                             

 

 [id number]                    2013 11:07:00                           
                                   Document Registration                       
                                                                             

 

 [id number]                    2012 16:00:00                           
                                   Document Registration                       
                                                                             

 

 [id number]                    2012 21:16:00                           
                                   Document Registration                       
                                                                             

 

 [id number]                    2012 21:35:00                           
                                   Document Registration

## 2017-07-10 NOTE — WOUND CARE PROGRESS NOTE
Subjective


Subjective


Subjective/Events-last exam


The patient is a 66-year-old female transferred from a nursing home after a 

fall with abrasion to her forehead, found to have urinary tract infection, and 

admitted. On admission the patient was noted to have bilateral heel ulcers and 

an ulcer of the sacrum.  The patient has had a previous stroke, is nonambulatory

, and according to nursing staff is maximum assist to transfer. The patient was 

urged to be up in the chair as much as possible, and when in bed to be on the 

right or left side, to allow healing of her sacral wound. She stated she would 

do this, in my presence. 2 minutes later, when asked to return to her side by 

the nursing staff, she refused to move off of her back. The patient was clearly 

told that her wounds would not heal with her laying on her back, in view of her 

incontinence.





Past medical history: The patient has COPD coronary artery disease hypertension 

stroke urinary incontinence arthritis chronic back pain debility diabetes 

depression.





Family history: None pertinent.





Social history:  The patient is a smoker, marital history not documented.





Review of Systems


Date Seen by Provider:  Jul 10, 2017


Time Seen by Provider:  18:30


General:  No Chills, Fatigue


HEENT:  No Head Aches, No Visual Changes


Pulmonary:  No Dyspnea


Cardiovascular:  No: Chest Pain


Gastrointestinal:  Nausea


Genitourinary:  Dysuria, Incontinence


Musculoskeletal:  back pain, leg pain, shoulder pain


Neurological:  Incoordination, Numbness, Weakness


Integumentary: Wounds as described.





Endocrine: Diabetes mellitus, non-insulin-dependent, poorly controlled.





Objective


Exam


Last Set of Vital Signs





Vital Signs








  Date Time  Temp Pulse Resp B/P (MAP) Pulse Ox O2 Delivery O2 Flow Rate FiO2


 


7/10/17 17:25 97.8 94 20 136/63 95 Room Air  





Capillary Refill : Less Than 3 Seconds


I&O











Intake and Output 


 


 7/10/17





 00:00


 


Intake Total 1450 ml


 


Balance 1450 ml


 


 


 


Intake Oral 250 ml


 


IV Total 1200 ml


 


# Voids 4


 


# Bowel Movements 5








General:  Alert, Mild Distress (Does not like to be turned.)


HEENT:  Mucous Memb Moist/Pink, Other (Abrasion right forehead.)


Neck:  No JVD, No LAD


Lungs:  Clear to Auscultation, Normal Air Movement


Heart:  Regular Rate, No Murmurs


Abdomen:  Normal Bowel Sounds, Soft


Extremities:  Other (Moderate bilateral edema.)


Skin:  Other (Sacral ulcer: 3.0 x 1.2 x 0.2 cm, 100% slough, moderate 

serosanguineous drainage, periwound macerated with numerous small areas of 

partial thickness ulcerations.  Right heel ulcer: 1.8 x 2.4 x 0.1 cm, 

desiccated brown blister; left heel ulcer 1.5 x 2.5 x 0.1: Nonblanching pigment 

change, stage I.)





Results


Lab


Laboratory Tests


7/9/17 20:16: Glucometer 103


7/10/17 05:45: 


White Blood Count 12.0H, Red Blood Count 3.42L, Hemoglobin 9.0L, Hematocrit 28L

, Mean Corpuscular Volume 83, Mean Corpuscular Hemoglobin 26, Mean Corpuscular 

Hemoglobin Concent 32, Red Cell Distribution Width 19.7H, Platelet Count 434H, 

Mean Platelet Volume 11.1H, Neutrophils (%) (Auto) 85H, Lymphocytes (%) (Auto) 

9L, Monocytes (%) (Auto) 5, Eosinophils (%) (Auto) 1, Basophils (%) (Auto) 0, 

Neutrophils # (Auto) 10.3H, Lymphocytes # (Auto) 1.1, Monocytes # (Auto) 0.6, 

Eosinophils # (Auto) 0.1, Basophils # (Auto) 0.0, Sodium Level 142, Potassium 

Level 4.0, Chloride Level 120H, Carbon Dioxide Level 14L, Anion Gap 8, Blood 

Urea Nitrogen 15, Creatinine 1.10, Estimat Glomerular Filtration Rate 49, BUN/

Creatinine Ratio 14, Glucose Level 151H, Calcium Level 8.4L, Magnesium Level 

1.6L


7/10/17 06:21: Glucometer 171H


7/10/17 10:48: Glucometer 222H


7/10/17 14:31: Glucometer 162H


7/10/17 18:35: Glucometer 151H





Microbiology


7/6/17 Urine Culture - Final, Complete


         Escherichia Coli





Assessment/Plan


1. Pressure ulcer sacrum, unstageable, with surrounding moisture associated 

skin damage, with superficial ulceration.





2. Urinary incontinence, with constantly moist perineum.





3. Pressure ulcer, right heel, stage II.





4. Usher ulcer, left heel, stage I.





5. Previous stroke with debility.





6. Diabetes mellitus, with hyperglycemia.





7. COPD, coronary artery disease, with ongoing smoking.





Plan:  We have ordered a barrier paste for the sacrum, and would continue the 

Allevyn pads and floating of the heels, the nursing staff has been urged to 

place her in the right lateral lower left lateral decubitus positions and a 30 

angle as much as possible. As shown tonight the patient declines that. The 

likelihood of this wound healing is very low, given this finding. Likelihood of 

it progressing to a more advanced stage, and potentially life-threatening 

infection and osteomyelitis is a great.  These points were emphasized to the 

patient.











ROLA COLLIER MD Jul 10, 2017 18:58

## 2017-07-11 VITALS — DIASTOLIC BLOOD PRESSURE: 68 MMHG | SYSTOLIC BLOOD PRESSURE: 140 MMHG

## 2017-07-11 VITALS — DIASTOLIC BLOOD PRESSURE: 68 MMHG | SYSTOLIC BLOOD PRESSURE: 137 MMHG

## 2017-07-11 VITALS — SYSTOLIC BLOOD PRESSURE: 137 MMHG | DIASTOLIC BLOOD PRESSURE: 68 MMHG

## 2017-07-11 LAB
ALBUMIN SERPL-MCNC: 2.3 GM/DL (ref 3.2–4.5)
ALT SERPL-CCNC: < 6 U/L (ref 0–55)
ANION GAP SERPL CALC-SCNC: 6 MMOL/L (ref 5–14)
AST SERPL-CCNC: 10 U/L (ref 5–34)
BILIRUB SERPL-MCNC: 0.4 MG/DL (ref 0.1–1)
BUN SERPL-MCNC: 10 MG/DL (ref 7–18)
BUN/CREAT SERPL: 11
CALCIUM SERPL-MCNC: 8 MG/DL (ref 8.5–10.1)
CHLORIDE SERPL-SCNC: 123 MMOL/L (ref 98–107)
CO2 SERPL-SCNC: 16 MMOL/L (ref 21–32)
CREAT SERPL-MCNC: 0.89 MG/DL (ref 0.6–1.3)
ERYTHROCYTE [DISTWIDTH] IN BLOOD BY AUTOMATED COUNT: 19.8 % (ref 10–14.5)
GFR SERPLBLD BASED ON 1.73 SQ M-ARVRAT: > 60 ML/MIN
GLUCOSE SERPL-MCNC: 112 MG/DL (ref 70–105)
MAGNESIUM SERPL-MCNC: 1.8 MG/DL (ref 1.8–2.4)
MCH RBC QN AUTO: 26 PG (ref 25–34)
MCHC RBC AUTO-ENTMCNC: 31 G/DL (ref 32–36)
MCV RBC AUTO: 84 FL (ref 80–99)
PLATELET # BLD: 360 10^3/UL (ref 130–400)
PMV BLD AUTO: 10.6 FL (ref 7.4–10.4)
POTASSIUM SERPL-SCNC: 3.4 MMOL/L (ref 3.6–5)
PROT SERPL-MCNC: 4.8 GM/DL (ref 6.4–8.2)
RBC # BLD AUTO: 3.09 10^6/UL (ref 4.35–5.85)
SODIUM SERPL-SCNC: 145 MMOL/L (ref 135–145)
WBC # BLD AUTO: 9.8 10^3/UL (ref 4.3–11)

## 2017-07-11 RX ADMIN — INSULIN HUMAN SCH UNIT: 100 INJECTION, SOLUTION PARENTERAL at 06:02

## 2017-07-11 RX ADMIN — CLOPIDOGREL BISULFATE SCH MG: 75 TABLET, FILM COATED ORAL at 09:27

## 2017-07-11 RX ADMIN — POTASSIUM CHLORIDE SCH MEQ: 750 TABLET, FILM COATED, EXTENDED RELEASE ORAL at 09:27

## 2017-07-11 RX ADMIN — AMLODIPINE BESYLATE SCH MG: 5 TABLET ORAL at 09:27

## 2017-07-11 RX ADMIN — NYSTATIN SCH ML: 100000 SUSPENSION ORAL at 09:27

## 2017-07-11 RX ADMIN — ANORECTAL OINTMENT SCH GM: 15.7; .44; 24; 20.6 OINTMENT TOPICAL at 09:28

## 2017-07-11 RX ADMIN — Medication SCH EA: at 06:45

## 2017-07-11 RX ADMIN — TERBINAFINE HYDROCHLORIDE SCH GM: 1 CREAM TOPICAL at 09:28

## 2017-07-11 RX ADMIN — FAMOTIDINE SCH MG: 20 TABLET, FILM COATED ORAL at 09:27

## 2017-07-11 RX ADMIN — Medication SCH MG: at 09:27

## 2017-07-11 RX ADMIN — INSULIN HUMAN SCH UNIT: 100 INJECTION, SOLUTION PARENTERAL at 11:14

## 2017-07-11 RX ADMIN — FERROUS SULFATE TAB 325 MG (65 MG ELEMENTAL FE) SCH MG: 325 (65 FE) TAB at 09:27

## 2017-07-11 NOTE — XMS REPORT
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       
                              

 

 2017                    GELLENDER DO, RODY MAZARIEGOS                    Ot   
                 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 
CORONARY                                      

 

 2017                    GELDER , RODY MAZARIEGOS                    Ot   
                 J44.9                    CHRONIC OBSTRUCTIVE PULMONARY DISEASE
, U                                     

 

 2017                    GELLENDER DO, RODY MAZARIEGOS                    Ot   
                 M54.2                    CERVICALGIA                          
           

 

 2017                    GELLENDER DO, RODY MAZARIEGOS                    Ot   
                 M54.9                    DORSALGIA, UNSPECIFIED               
                      

 

 2017                    GELLENDER DO, RODY MAZARIEGOS                    Ot   
                 N17.9                    ACUTE KIDNEY FAILURE, UNSPECIFIED    
                                 

 

 2017                    GELLENDER DO, RODY MAZARIEGOS                    Ot   
                 N39.0                    URINARY TRACT INFECTION, SITE NOT 
SPECIF                                     

 

 2017                    GELLENDER DO, RODY MAZARIEGOS                    Ot   
                 R26.81                    UNSTEADINESS ON FEET                
                     

 

 2017                    GELLENDER DO, RODY MAZARIEGOS                    Ot   
                 R32                    UNSPECIFIED URINARY INCONTINENCE       
                              

 

 2017                    GELLENDER DORODY                    Ot   
                 R53.1                    WEAKNESS                             
        

 

 2017                    GELLENDER DO, RODY MAZARIEGOS                    Ot   
                 Z79.84                    LONG TERM (CURRENT) USE OF ORAL 
HYPOGLYC                                     

 

 2017                    GELLENDER DO, RODY MAZARIEGOS                    Ot   
                 Z86.73                    PRSNL HX OF TIA (TIA), AND CEREB 
INFRC W                                     

 

 2017                    GELLENDER DO, RODY MAZARIEGOS                    Ot   
                 Z87.891                    PERSONAL HISTORY OF NICOTINE 
DEPENDENCE                                     

 

 2017                    GELLENDER DO, RODY MAZARIEGOS                    Ot   
                 R39.9                    UNSP SYMPTOMS AND SIGNS INVOLVING THE 
GE                                     

 

 2017                    GELLENDER DO, RODY MAZARIEGOS                    Ot   
                 R39.9                    UNSP SYMPTOMS AND SIGNS INVOLVING THE 
GE                                     

 

 2017                    GELLENDER DO, RODY MAZARIEGOS                    Ot   
                 E56.9                    VITAMIN DEFICIENCY, UNSPECIFIED      
                               

 

 2017                    GELLENDER DO, RODY MAZARIEGOS                    Ot   
                 E78.4                    OTHER HYPERLIPIDEMIA                 
                    

 

 2017                    GELLENDER DO, RODY YAIR                    Ot   
                 K59.00                    CONSTIPATION, UNSPECIFIED           
                          

 

 06/15/2017                    GELLENDER DO, RODY MAZARIEGOS                    Ot   
                 E56.9                    VITAMIN DEFICIENCY, UNSPECIFIED      
                               

 

 06/15/2017                    GELLENDER DO, RODY YAIR                    Ot   
                 E78.4                    OTHER HYPERLIPIDEMIA                 
                    

 

 06/15/2017                    GELLENDER DO, RODY MAZARIEGOS                    Ot   
                 K59.00                    CONSTIPATION, UNSPECIFIED           
                          

 

 2017                    GELLENDER DO, RODY MAZARIEGOS                    Ot   
                 N19                    UNSPECIFIED KIDNEY FAILURE             
                        

 

 2017                    GELLENDER DO, RODY MAZARIEGOS                    Ot   
                 N19                    UNSPECIFIED KIDNEY FAILURE             
                        

 

 2017                    GELLENDER DO, RODY MAZARIEGOS                    Ot   
                 R19.5                    OTHER FECAL ABNORMALITIES            
                         

 

 2017                    GELLENDER DO, RODY MAZARIEGOS                    Ot   
                 R19.7                    DIARRHEA, UNSPECIFIED                
                     

 

 2017                    GELLENDER DO, RODY MAZARIEGOS                    Ot   
                 R19.5                    OTHER FECAL ABNORMALITIES            
                         

 

 2017                    GELLENDER DO, RODY MAZARIEGOS                    Ot   
                 R19.7                    DIARRHEA, UNSPECIFIED                
                     

 

 2017                    GELLENDER DO, RODY MAZARIEGOS                    Ot   
                 E11.9                    TYPE 2 DIABETES MELLITUS WITHOUT 
COMPLIC                                     

 

 2017                    GELLENDER DO, RODY MAZARIEGOS                    Ot   
                 E78.00                    PURE HYPERCHOLESTEROLEMIA, 
UNSPECIFIED                                     

 

 2017                    GELLENDER DO, RODY MAZARIEGOS                    Ot   
                 F17.210                    NICOTINE DEPENDENCE, CIGARETTES, 
UNCOMPL                                     

 

 2017                    GELLENDER DO, RODY MAZARIEGOS                    Ot   
                 F32.9                    MAJOR DEPRESSIVE DISORDER, SINGLE 
EPISOD                                     

 

 2017                    GELLENDER DO, RODY MAZARIEGOS                    Ot   
                 G47.10                    HYPERSOMNIA, UNSPECIFIED            
                         

 

 2017                    GELLENDER DO, RODY MAZARIEGOS                    Ot   
                 I10                    ESSENTIAL (PRIMARY) HYPERTENSION       
                              

 

 2017                    GELLENDER DO, RODY MAZARIEGOS                    Ot   
                 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 
CORONARY                                      

 

 2017                    GELLENDER DO, RODY MAZARIEGOS                    Ot   
                 J44.9                    CHRONIC OBSTRUCTIVE PULMONARY DISEASE
, U                                     

 

 2017                    GELLENDER DO, RODY MAZARIEGOS                    Ot   
                 K59.00                    CONSTIPATION, UNSPECIFIED           
                          

 

 2017                    GELLENDER DO, RODY MAZARIEGOS                    Ot   
                 M25.512                    PAIN IN LEFT SHOULDER              
                       

 

 2017                    GELLENDER DO, RODY MAZARIEGOS                    Ot   
                 M54.2                    CERVICALGIA                          
           

 

 2017                    GELLENDER DO, RODY MAZARIEGOS                    Ot   
                 M54.9                    DORSALGIA, UNSPECIFIED               
                      

 

 2017                    GELLENDER DO, RODY MAZARIEGOS                    Ot   
                 N17.9                    ACUTE KIDNEY FAILURE, UNSPECIFIED    
                                 

 

 2017                    GELLENDER DO, RODY MAZARIEGOS                    Ot   
                 R26.81                    UNSTEADINESS ON FEET                
                     

 

 2017                    ERNSTLENDER DO, RODY MAZARIEGOS                    Ot   
                 R53.1                    WEAKNESS                             
        

 

 2017                    EHSAN PONCE, RODY MAZARIEGOS                    Ot   
                 S00.83XA                    CONTUSION OF OTHER PART OF HEAD, 
INITIAL                                     

 

 2017                    EHSAN PONCE, RODY MAZARIEGOS                    Ot   
                 W05.0XXA                    FALL FROM NON-MOVING WHEELCHAIR, 
INITIAL                                     

 

 2017                    EHSAN PONCE, RODY MAZARIEGOS                    Ot   
                 Y92.129                    UNSP PLACE IN NURSING HOME AS PLACE
                                     

 

 2017                    EHSAN DO, RODY MAZARIEGOS                    Ot   
                 Z79.02                    LONG TERM (CURRENT) USE OF 
ANTITHROMBOTI                                     

 

 2017                    ERNSTLENDER , RODY MAZARIEGOS                    Ot   
                 Z79.84                    LONG TERM (CURRENT) USE OF ORAL 
HYPOGLYC                                     

 

 2017                    ERNSTLEN, RODY MAZARIEGOS                    Ot   
                 Z86.73                    PRSNL HX OF TIA (TIA), AND CEREB 
INFRC W                                     

 

 2017                    EHSAN , RODY MAZARIEGOS                    Ot   
                 E11.9                    TYPE 2 DIABETES MELLITUS WITHOUT 
COMPLIC                                     

 

 2017                    ERNSTDER , RODY MAZARIEGOS                    Ot   
                 E78.00                    PURE HYPERCHOLESTEROLEMIA, 
UNSPECIFIED                                     

 

 2017                    GELLENDER DO, RODY MAZARIEGOS                    Ot   
                 F17.210                    NICOTINE DEPENDENCE, CIGARETTES, 
UNCOMPL                                     

 

 2017                    EHSAN PONCERODY                    Ot   
                 F32.9                    MAJOR DEPRESSIVE DISORDER, SINGLE 
EPISOD                                     

 

 2017                    NORAHDER DO, RODY MAZARIEGOS                    Ot   
                 G47.10                    HYPERSOMNIA, UNSPECIFIED            
                         

 

 2017                    EHSAN PONCERODY                    Ot   
                 I10                    ESSENTIAL (PRIMARY) HYPERTENSION       
                              

 

 2017                    EHSAN PONCERODY                    Ot   
                 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 
CORONARY                                      

 

 2017                    ERNSTDER DORODY                    Ot   
                 J44.9                    CHRONIC OBSTRUCTIVE PULMONARY DISEASE
, U                                     

 

 2017                    ERNSTLENDER DO, RODY MAZARIEGOS                    Ot   
                 K59.00                    CONSTIPATION, UNSPECIFIED           
                          

 

 2017                    ERNSTLENDER DORODY                    Ot   
                 M25.512                    PAIN IN LEFT SHOULDER              
                       

 

 2017                    ERNSTLENDER DO, RODY MAZARIEGOS                    Ot   
                 M54.2                    CERVICALGIA                          
           

 

 2017                    GELLENDER DORODY                    Ot   
                 M54.9                    DORSALGIA, UNSPECIFIED               
                      

 

 2017                    GELLENDER DORODY                    Ot   
                 N17.9                    ACUTE KIDNEY FAILURE, UNSPECIFIED    
                                 

 

 2017                    GELLENDER DO, RODY MAZARIEGOS                    Ot   
                 R26.81                    UNSTEADINESS ON FEET                
                     

 

 2017                    GELLENDER DO, RODY MAZARIEGOS                    Ot   
                 R53.1                    WEAKNESS                             
        

 

 2017                    NORAHDER RODY PONCE                    Ot   
                 S00.83XA                    CONTUSION OF OTHER PART OF HEAD, 
INITIAL                                     

 

 2017                    RODY MOSER DO                    Ot   
                 W05.0XXA                    FALL FROM NON-MOVING WHEELCHAIR, 
INITIAL                                     

 

 2017                    EHSAN PONCE, RODY MAZARIEGOS                    Ot   
                 Y92.129                    UNSP PLACE IN NURSING HOME AS PLACE
                                     

 

 2017                    NORAHDER DO, RODY MAZARIEGOS                    Ot   
                 Z79.02                    LONG TERM (CURRENT) USE OF 
ANTITHROMBOTI                                     

 

 2017                    ERNSTLENDER DO, RODY MAZARIEGOS                    Ot   
                 Z79.84                    LONG TERM (CURRENT) USE OF ORAL 
HYPOGLYC                                     

 

 2017                    ERNSTLENDER DO, RODY MAZARIEGOS                    Ot   
                 Z86.73                    PRSNL HX OF TIA (TIA), AND CEREB 
INFRC W                                     



                                                                               
                                                                               
                                                                               
                                                                               
                                                                               
                                                                               
                                                                               
                                                                               
                                                                               
                                                                               
                                                                               
                                                                               
                                                                               
                                                                               
                                                                               
                                                                               
                                                                               
                                                                               
                                                                               
                                                                               
                                                                               
                                                                               
                                                                               
                                                                               
                                                                               
             



Procedures

                                                                               
         



Results

                      





 Test                    Result                    Range                









 Complete urinalysis with reflex to culture - 17 10:20                









 Urine color determination                    YELLOW                     NRG   
             

 

 Urine clarity determination                    CLEAR                     NRG  
              

 

 Urine pH measurement by test strip                    6.5                     5
-9                

 

 Specific gravity of urine by test strip                    1.010              
       1.016-1.022                

 

 Urine protein assay by test strip, semi-quantitative                    2+    
                 NEGATIVE                

 

 Urine glucose detection by automated test strip                    NEGATIVE   
                  NEGATIVE                

 

 Erythrocytes detection in urine sediment by light microscopy                  
  NEGATIVE                     NEGATIVE                

 

 Urine ketones detection by automated test strip                    NEGATIVE   
                  NEGATIVE                

 

 Urine nitrite detection by test strip                    NEGATIVE             
        NEGATIVE                

 

 Urine total bilirubin detection by test strip                    NEGATIVE     
                NEGATIVE                

 

 Urine urobilinogen measurement by automated test strip (mass/volume)          
          NORMAL                     NORMAL                

 

 Urine leukocyte esterase detection by dipstick                    NEGATIVE    
                 NEGATIVE                

 

 Automated urine sediment erythrocyte count by microscopy (number/high power 
field)                    NONE                     NRG                

 

 Automated urine sediment leukocyte count by microscopy (number/high power field
)                    NONE                     NRG                

 

 Bacteria detection in urine sediment by light microscopy                    
TRACE                     NRG                

 

 Squamous epithelial cells detection in urine sediment by light microscopy     
               0-2                     NRG                

 

 Crystals detection in urine sediment by light microscopy                    
NONE                     NRG                

 

 Casts detection in urine sediment by light microscopy                    NONE 
                    NRG                

 

 Mucus detection in urine sediment by light microscopy                    
NEGATIVE                     NRG                

 

 Complete urinalysis with reflex to culture                    NO              
       NRG                

 

 Renal epithelial cells detection in urine sediment by light microscopy        
            NONE                     NRG                









 Complete urinalysis with reflex to culture - 17 01:56                









 Urine color determination                    YELLOW                     NRG   
             

 

 Urine clarity determination                    VERY CLOUDY                     
NRG                

 

 Urine pH measurement by test strip                    5                     5-
9                

 

 Specific gravity of urine by test strip                    1.025              
       1.016-1.022                

 

 Urine protein assay by test strip, semi-quantitative                    3+    
                 NEGATIVE                

 

 Urine glucose detection by automated test strip                    NEGATIVE   
                  NEGATIVE                

 

 Erythrocytes detection in urine sediment by light microscopy                  
  2+                     NEGATIVE                

 

 Urine ketones detection by automated test strip                    NEGATIVE   
                  NEGATIVE                

 

 Urine nitrite detection by test strip                    NEGATIVE             
        NEGATIVE                

 

 Urine total bilirubin detection by test strip                    NEGATIVE     
                NEGATIVE                

 

 Urine urobilinogen measurement by automated test strip (mass/volume)          
          NORMAL                     NORMAL                

 

 Urine leukocyte esterase detection by dipstick                    NEGATIVE    
                 NEGATIVE                

 

 Automated urine sediment erythrocyte count by microscopy (number/high power 
field)                    NONE                     NRG                

 

 Automated urine sediment leukocyte count by microscopy (number/high power field
)                    RARE                     NRG                

 

 Bacteria detection in urine sediment by light microscopy                    
LARGE                     NRG                

 

 Squamous epithelial cells detection in urine sediment by light microscopy     
               2-5                     NRG                

 

 Crystals detection in urine sediment by light microscopy                    
NONE                     NRG                

 

 Casts detection in urine sediment by light microscopy                    NONE 
                    NRG                

 

 Mucus detection in urine sediment by light microscopy                    
NEGATIVE                     NRG                

 

 Complete urinalysis with reflex to culture                    YES             
        NRG                









 Bacterial urine culture - 17 01:56                









 URINE CULTURE RESULTS                    MORE THAN 3 ISOLATES                 
    NRG                









 Stool occult blood screen - 17 08:00                









 Stool gastrointestinal hemoglobin detection                    POSITIVE       
              NEGATIVE                









 Complete blood count (CBC) with automated white blood cell (WBC) differential 
- 17 12:50                









 Blood leukocytes automated count (number/volume)                    10.2 10*3/
uL                    4.3-11.0                

 

 Blood erythrocytes automated count (number/volume)                    4.17 10*6
/uL                    4.35-5.85                

 

 Venous blood hemoglobin measurement (mass/volume)                    11.0 g/dL
                    11.5-16.0                

 

 Blood hematocrit (volume fraction)                    33 %                    
35-52                

 

 Automated erythrocyte mean corpuscular volume                    80 [foz_us]  
                  80-99                

 

 Automated erythrocyte mean corpuscular hemoglobin (mass per erythrocyte)      
              26 pg                    25-34                

 

 Automated erythrocyte mean corpuscular hemoglobin concentration measurement (
mass/volume)                    33 g/dL                    32-36                

 

 Automated erythrocyte distribution width ratio                    14.1 %      
              10.0-14.5                

 

 Automated blood platelet count (count/volume)                    471 10*3/uL  
                  130-400                

 

 Automated blood platelet mean volume measurement                    10.4 [foz_
us]                    7.4-10.4                

 

 Automated blood neutrophils/100 leukocytes                    83 %            
        42-75                

 

 Automated blood lymphocytes/100 leukocytes                    8 %             
       12-44                

 

 Blood monocytes/100 leukocytes                    8 %                    0-12 
               

 

 Automated blood eosinophils/100 leukocytes                    0 %             
       0-10                

 

 Automated blood basophils/100 leukocytes                    0 %               
     0-10                

 

 Blood neutrophils automated count (number/volume)                    8.5 10*3 
                   1.8-7.8                

 

 Blood lymphocytes automated count (number/volume)                    0.8 10*3 
                   1.0-4.0                

 

 Blood monocytes automated count (number/volume)                    0.9 10*3   
                 0.0-1.0                

 

 Automated eosinophil count                    0.0 10*3/uL                    
0.0-0.3                

 

 Automated blood basophil count (count/volume)                    0.0 10*3/uL  
                  0.0-0.1                









 Comprehensive metabolic panel - 17 12:50                









 Serum or plasma sodium measurement (moles/volume)                    138 mmol/
L                    135-145                

 

 Serum or plasma potassium measurement (moles/volume)                    3.3 
mmol/L                    3.6-5.0                

 

 Serum or plasma chloride measurement (moles/volume)                    107 mmol
/L                                    

 

 Carbon dioxide                    15 mmol/L                    21-32          
      

 

 Serum or plasma anion gap determination (moles/volume)                    16 
mmol/L                    5-14                

 

 Serum or plasma urea nitrogen measurement (mass/volume)                    46 
mg/dL                    7-18                

 

 Serum or plasma creatinine measurement (mass/volume)                    3.52 mg
/dL                    0.60-1.30                

 

 Serum or plasma urea nitrogen/creatinine mass ratio                    13     
                NRG                

 

 Serum or plasma creatinine measurement with calculation of estimated 
glomerular filtration rate                    13                     NRG       
         

 

 Serum or plasma glucose measurement (mass/volume)                    149 mg/dL
                                    

 

 Serum or plasma calcium measurement (mass/volume)                    9.0 mg/dL
                    8.5-10.1                

 

 Serum or plasma total bilirubin measurement (mass/volume)                    
0.3 mg/dL                    0.1-1.0                

 

 Serum or plasma alkaline phosphatase measurement (enzymatic activity/volume)  
                  57 U/L                                    

 

 Serum or plasma aspartate aminotransferase measurement (enzymatic activity/
volume)                    9 U/L                    5-34                

 

 Serum or plasma alanine aminotransferase measurement (enzymatic activity/volume
)                    < U/L                    0-55                

 

 Serum or plasma protein measurement (mass/volume)                    6.7 g/dL 
                   6.4-8.2                

 

 Serum or plasma albumin measurement (mass/volume)                    3.6 g/dL 
                   3.2-4.5                









 Lipase - 17 12:50                









 Lipase                    22 U/L                    8-78                









 Complete urinalysis with reflex to culture - 17 14:05                









 Urine color determination                    YELLOW                     NRG   
             

 

 Urine clarity determination                    SLIGHTLY CLOUDY                
     NRG                

 

 Urine pH measurement by test strip                    5                     5-
9                

 

 Specific gravity of urine by test strip                    1.025              
       1.016-1.022                

 

 Urine protein assay by test strip, semi-quantitative                    2+    
                 NEGATIVE                

 

 Urine glucose detection by automated test strip                    NEGATIVE   
                  NEGATIVE                

 

 Erythrocytes detection in urine sediment by light microscopy                  
  NEGATIVE                     NEGATIVE                

 

 Urine ketones detection by automated test strip                    1+         
            NEGATIVE                

 

 Urine nitrite detection by test strip                    NEGATIVE             
        NEGATIVE                

 

 Urine total bilirubin detection by test strip                    1+           
          NEGATIVE                

 

 Urine urobilinogen measurement by automated test strip (mass/volume)          
          NORMAL                     NORMAL                

 

 Urine leukocyte esterase detection by dipstick                    1+          
           NEGATIVE                

 

 Automated urine sediment erythrocyte count by microscopy (number/high power 
field)                    NONE                     NRG                

 

 Automated urine sediment leukocyte count by microscopy (number/high power field
)                     [HPF]                    NRG                

 

 Bacteria detection in urine sediment by light microscopy                    
FEW                     NRG                

 

 Squamous epithelial cells detection in urine sediment by light microscopy     
               5-10                     NRG                

 

 Crystals detection in urine sediment by light microscopy                    
PRESENT                     NRG                

 

 Casts detection in urine sediment by light microscopy                    
PRESENT                     NRG                

 

 Mucus detection in urine sediment by light microscopy                    
NEGATIVE                     NRG                

 

 Complete urinalysis with reflex to culture                    YES             
        NRG                

 

 Amorphous sediment detection in urine sediment by light microscopy            
        FEW OSWALDO URATES                     NRG                

 

 Hyaline casts detection in urine sediment by light microscopy                 
   10-25                     NRG                









 Bacterial urine culture - 17 14:05                









 Bacterial urine culture                    [id number]                     NRG  
              

 

 COLONY COUNT                    <10,000                     NRG                

 

 [facility];REPORTABLE                    SENSITIVITY REPORTED AT 0926, 17       
              NRG                

 

 URINE CULTURE RESULTS                    PLUS                     NRG         
       

 

 FREE TEXT ENTRY 2                    UNUSUAL RESISTANCE PATTERN DETECTED,     
                NRG                

 

 FREE TEXT ENTRY 3                    ESBL IDENTIFIED.                     NRG 
               









 Bacterial susceptibility panel - 17 14:05                









 Gentamicin susceptibility test by minimum inhibitory concentration            
        <=                    NRG                

 

 Trimethoprim/sulfamethoxazole susceptibility test by minimum 
inhibitoryconcentration                    >=                    NRG           
     

 

 Ampicillin susceptibility test by minimum inhibitory concentration            
        >=                    NRG                

 

 Tobramycin susceptibility test by minimum inhibitory concentration            
        <=                    NRG                

 

 Cefazolin susceptibility test by minimum inhibitory concentration             
       >=                    NRG                

 

 Ceftriaxone susceptibility test by minimum inhibitory concentration           
         >=                    NRG                

 

 Ampicillin/sulbactam susceptibility test by minimum inhibitory concentration  
                  >=                    NRG                

 

 Ciprofloxacin susceptibility test by minimum inhibitory concentration         
           >=                    NRG                

 

 Meropenem susceptibility test by minimum inhibitory concentration             
       <=                    NRG                

 

 Nitrofurantoin susceptibility test by minimum inhibitory concentration        
            <=                    NRG                

 

 Aztreonam susceptibility test by minimum inhibitory concentration             
       R                     NRG                

 

 Extended spectrum beta lactamase (ESBL) producing bacteria susceptibility test 
by minimum inhibitory concentration                    +                     
NRG                









 C DIFFICILE AG + TOXIN A/B. - 17 22:02                









 CALL POSITIVES (F1 HELP)                    CALLED TO PARISA ELIZABETH 17 9:55 
BY JOANNE OSEI                     NRG                

 

 SPECIAL CONTACT                    SPECIAL CONTACT PRECAUTIONS NEEDED         
            NRG                

 

 RESULTS                    POSITIVE FOR ANTIGEN AND TOXIN A/B                 
    NRG                









 Stool bacteria identification by culture - 17 22:10                









 Stool bacteria identification by culture                    N2                
     NRG                









 [id number] - 17 22:10                









 [id number]                    FOOTNOTE                     NRG                









 Complete blood count (CBC) with automated white blood cell (WBC) differential 
- 17 04:48                









 Blood leukocytes automated count (number/volume)                    6.4 10*3/
uL                    4.3-11.0                

 

 Blood erythrocytes automated count (number/volume)                    3.87 10*6
/uL                    4.35-5.85                

 

 Venous blood hemoglobin measurement (mass/volume)                    10.0 g/dL
                    11.5-16.0                

 

 Blood hematocrit (volume fraction)                    31 %                    
35-52                

 

 Automated erythrocyte mean corpuscular volume                    80 [foz_us]  
                  80-99                

 

 Automated erythrocyte mean corpuscular hemoglobin (mass per erythrocyte)      
              26 pg                    25-34                

 

 Automated erythrocyte mean corpuscular hemoglobin concentration measurement (
mass/volume)                    33 g/dL                    32-36                

 

 Automated erythrocyte distribution width ratio                    14.1 %      
              10.0-14.5                

 

 Automated blood platelet count (count/volume)                    344 10*3/uL  
                  130-400                

 

 Automated blood platelet mean volume measurement                    10.2 [foz_
us]                    7.4-10.4                

 

 Automated blood neutrophils/100 leukocytes                    66 %            
        42-75                

 

 Automated blood lymphocytes/100 leukocytes                    19 %            
        12-44                

 

 Blood monocytes/100 leukocytes                    14 %                    0-12
                

 

 Automated blood eosinophils/100 leukocytes                    1 %             
       0-10                

 

 Automated blood basophils/100 leukocytes                    0 %               
     0-10                

 

 Blood neutrophils automated count (number/volume)                    4.2 10*3 
                   1.8-7.8                

 

 Blood lymphocytes automated count (number/volume)                    1.2 10*3 
                   1.0-4.0                

 

 Blood monocytes automated count (number/volume)                    0.9 10*3   
                 0.0-1.0                

 

 Automated eosinophil count                    0.1 10*3/uL                    
0.0-0.3                

 

 Automated blood basophil count (count/volume)                    0.0 10*3/uL  
                  0.0-0.1                









 Comprehensive metabolic panel - 17 04:48                









 Serum or plasma sodium measurement (moles/volume)                    141 mmol/
L                    135-145                

 

 Serum or plasma potassium measurement (moles/volume)                    3.3 
mmol/L                    3.6-5.0                

 

 Serum or plasma chloride measurement (moles/volume)                    116 mmol
/L                                    

 

 Carbon dioxide                    13 mmol/L                    21-32          
      

 

 Serum or plasma anion gap determination (moles/volume)                    12 
mmol/L                    5-14                

 

 Serum or plasma urea nitrogen measurement (mass/volume)                    36 
mg/dL                    7-18                

 

 Serum or plasma creatinine measurement (mass/volume)                    2.06 mg
/dL                    0.60-1.30                

 

 Serum or plasma urea nitrogen/creatinine mass ratio                    17     
                NRG                

 

 Serum or plasma creatinine measurement with calculation of estimated 
glomerular filtration rate                    24                     NRG       
         

 

 Serum or plasma glucose measurement (mass/volume)                    85 mg/dL 
                                   

 

 Serum or plasma calcium measurement (mass/volume)                    8.2 mg/dL
                    8.5-10.1                

 

 Serum or plasma total bilirubin measurement (mass/volume)                    
0.3 mg/dL                    0.1-1.0                

 

 Serum or plasma alkaline phosphatase measurement (enzymatic activity/volume)  
                  46 U/L                                    

 

 Serum or plasma aspartate aminotransferase measurement (enzymatic activity/
volume)                    7 U/L                    5-34                

 

 Serum or plasma alanine aminotransferase measurement (enzymatic activity/volume
)                    < U/L                    0-55                

 

 Serum or plasma protein measurement (mass/volume)                    5.6 g/dL 
                   6.4-8.2                

 

 Serum or plasma albumin measurement (mass/volume)                    2.9 g/dL 
                   3.2-4.5                









 Automated blood complete blood count (hemogram) panel - 17 06:03        
        









 Blood leukocytes automated count (number/volume)                    8.0 10*3/
uL                    4.3-11.0                

 

 Blood erythrocytes automated count (number/volume)                    3.62 10*6
/uL                    4.35-5.85                

 

 Venous blood hemoglobin measurement (mass/volume)                    9.4 g/dL 
                   11.5-16.0                

 

 Blood hematocrit (volume fraction)                    29 %                    
35-52                

 

 Automated erythrocyte mean corpuscular volume                    80 [foz_us]  
                  80-99                

 

 Automated erythrocyte mean corpuscular hemoglobin (mass per erythrocyte)      
              26 pg                    25-34                

 

 Automated erythrocyte mean corpuscular hemoglobin concentration measurement (
mass/volume)                    33 g/dL                    32-36                

 

 Automated erythrocyte distribution width ratio                    14.2 %      
              10.0-14.5                

 

 Automated blood platelet count (count/volume)                    441 10*3/uL  
                  130-400                

 

 Automated blood platelet mean volume measurement                    10.2 [foz_
us]                    7.4-10.4                









 Comprehensive metabolic panel - 17 06:03                









 Serum or plasma sodium measurement (moles/volume)                    141 mmol/
L                    135-145                

 

 Serum or plasma potassium measurement (moles/volume)                    3.2 
mmol/L                    3.6-5.0                

 

 Serum or plasma chloride measurement (moles/volume)                    115 mmol
/L                                    

 

 Carbon dioxide                    17 mmol/L                    21-32          
      

 

 Serum or plasma anion gap determination (moles/volume)                    9 
mmol/L                    5-14                

 

 Serum or plasma urea nitrogen measurement (mass/volume)                    18 
mg/dL                    7-18                

 

 Serum or plasma creatinine measurement (mass/volume)                    1.02 mg
/dL                    0.60-1.30                

 

 Serum or plasma urea nitrogen/creatinine mass ratio                    18     
                NRG                

 

 Serum or plasma creatinine measurement with calculation of estimated 
glomerular filtration rate                    54                     NRG       
         

 

 Serum or plasma glucose measurement (mass/volume)                    140 mg/dL
                                    

 

 Serum or plasma calcium measurement (mass/volume)                    8.2 mg/dL
                    8.5-10.1                

 

 Serum or plasma total bilirubin measurement (mass/volume)                    
0.3 mg/dL                    0.1-1.0                

 

 Serum or plasma alkaline phosphatase measurement (enzymatic activity/volume)  
                  45 U/L                                    

 

 Serum or plasma aspartate aminotransferase measurement (enzymatic activity/
volume)                    9 U/L                    5-34                

 

 Serum or plasma alanine aminotransferase measurement (enzymatic activity/volume
)                    < U/L                    0-55                

 

 Serum or plasma protein measurement (mass/volume)                    5.5 g/dL 
                   6.4-8.2                

 

 Serum or plasma albumin measurement (mass/volume)                    2.8 g/dL 
                   3.2-4.5                









 Automated blood complete blood count (hemogram) panel - 17 06:25        
        









 Blood leukocytes automated count (number/volume)                    8.3 10*3/
uL                    4.3-11.0                

 

 Blood erythrocytes automated count (number/volume)                    3.89 10*6
/uL                    4.35-5.85                

 

 Venous blood hemoglobin measurement (mass/volume)                    10.1 g/dL
                    11.5-16.0                

 

 Blood hematocrit (volume fraction)                    31 %                    
35-52                

 

 Automated erythrocyte mean corpuscular volume                    79 [foz_us]  
                  80-99                

 

 Automated erythrocyte mean corpuscular hemoglobin (mass per erythrocyte)      
              26 pg                    25-34                

 

 Automated erythrocyte mean corpuscular hemoglobin concentration measurement (
mass/volume)                    33 g/dL                    32-36                

 

 Automated erythrocyte distribution width ratio                    14.2 %      
              10.0-14.5                

 

 Automated blood platelet count (count/volume)                    446 10*3/uL  
                  130-400                

 

 Automated blood platelet mean volume measurement                    10.0 [foz_
us]                    7.4-10.4                









 Comprehensive metabolic panel - 17 06:25                









 Serum or plasma sodium measurement (moles/volume)                    140 mmol/
L                    135-145                

 

 Serum or plasma potassium measurement (moles/volume)                    3.3 
mmol/L                    3.6-5.0                

 

 Serum or plasma chloride measurement (moles/volume)                    112 mmol
/L                                    

 

 Carbon dioxide                    18 mmol/L                    21-32          
      

 

 Serum or plasma anion gap determination (moles/volume)                    10 
mmol/L                    5-14                

 

 Serum or plasma urea nitrogen measurement (mass/volume)                    6 mg
/dL                    7-18                

 

 Serum or plasma creatinine measurement (mass/volume)                    0.70 mg
/dL                    0.60-1.30                

 

 Serum or plasma urea nitrogen/creatinine mass ratio                    9      
               NRG                

 

 Serum or plasma creatinine measurement with calculation of estimated 
glomerular filtration rate                    >                     NRG        
        

 

 Serum or plasma glucose measurement (mass/volume)                    95 mg/dL 
                                   

 

 Serum or plasma calcium measurement (mass/volume)                    8.0 mg/dL
                    8.5-10.1                

 

 Serum or plasma total bilirubin measurement (mass/volume)                    
0.3 mg/dL                    0.1-1.0                

 

 Serum or plasma alkaline phosphatase measurement (enzymatic activity/volume)  
                  49 U/L                                    

 

 Serum or plasma aspartate aminotransferase measurement (enzymatic activity/
volume)                    10 U/L                    5-34                

 

 Serum or plasma alanine aminotransferase measurement (enzymatic activity/volume
)                    < U/L                    0-55                

 

 Serum or plasma protein measurement (mass/volume)                    5.6 g/dL 
                   6.4-8.2                

 

 Serum or plasma albumin measurement (mass/volume)                    3.0 g/dL 
                   3.2-4.5                









 Complete blood count (CBC) with automated white blood cell (WBC) differential 
- 17 04:54                









 Blood leukocytes automated count (number/volume)                    8.0 10*3/
uL                    4.3-11.0                

 

 Blood erythrocytes automated count (number/volume)                    3.71 10*6
/uL                    4.35-5.85                

 

 Venous blood hemoglobin measurement (mass/volume)                    9.7 g/dL 
                   11.5-16.0                

 

 Blood hematocrit (volume fraction)                    29 %                    
35-52                

 

 Automated erythrocyte mean corpuscular volume                    79 [foz_us]  
                  80-99                

 

 Automated erythrocyte mean corpuscular hemoglobin (mass per erythrocyte)      
              26 pg                    25-34                

 

 Automated erythrocyte mean corpuscular hemoglobin concentration measurement (
mass/volume)                    33 g/dL                    32-36                

 

 Automated erythrocyte distribution width ratio                    14.3 %      
              10.0-14.5                

 

 Automated blood platelet count (count/volume)                    433 10*3/uL  
                  130-400                

 

 Automated blood platelet mean volume measurement                    10.3 [foz_
us]                    7.4-10.4                

 

 Automated blood neutrophils/100 leukocytes                    63 %            
        42-75                

 

 Automated blood lymphocytes/100 leukocytes                    24 %            
        12-44                

 

 Blood monocytes/100 leukocytes                    12 %                    0-12
                

 

 Automated blood eosinophils/100 leukocytes                    1 %             
       0-10                

 

 Automated blood basophils/100 leukocytes                    0 %               
     0-10                

 

 Blood neutrophils automated count (number/volume)                    5.1 10*3 
                   1.8-7.8                

 

 Blood lymphocytes automated count (number/volume)                    1.9 10*3 
                   1.0-4.0                

 

 Blood monocytes automated count (number/volume)                    0.9 10*3   
                 0.0-1.0                

 

 Automated eosinophil count                    0.1 10*3/uL                    
0.0-0.3                

 

 Automated blood basophil count (count/volume)                    0.0 10*3/uL  
                  0.0-0.1                









 Serum or plasma renal function panel (Na, K, Cl, CO2, BUN, Cr, glucose,Ca, phos
, alb) - 17 04:54                









 Serum or plasma sodium measurement (moles/volume)                    139 mmol/
L                    135-145                

 

 Serum or plasma potassium measurement (moles/volume)                    3.3 
mmol/L                    3.6-5.0                

 

 Serum or plasma chloride measurement (moles/volume)                    112 mmol
/L                                    

 

 Carbon dioxide                    18 mmol/L                    21-32          
      

 

 Serum or plasma anion gap determination (moles/volume)                    9 
mmol/L                    5-14                

 

 Serum or plasma urea nitrogen measurement (mass/volume)                    4 mg
/dL                    7-18                

 

 Serum or plasma creatinine measurement (mass/volume)                    0.65 mg
/dL                    0.60-1.30                

 

 Serum or plasma urea nitrogen/creatinine mass ratio                    6      
               NRG                

 

 Serum or plasma creatinine measurement with calculation of estimated 
glomerular filtration rate                    >                     NRG        
        

 

 Serum or plasma glucose measurement (mass/volume)                    90 mg/dL 
                                   

 

 Serum or plasma calcium measurement (mass/volume)                    7.4 mg/dL
                    8.5-10.1                

 

 Serum or plasma albumin measurement (mass/volume)                    2.6 g/dL 
                   3.2-4.5                

 

 Serum or plasma phosphate measurement (mass/volume)                    1.5 mg/
dL                    2.3-4.7                









 Magnesium - 17 04:54                









 Magnesium                    0.8 mg/dL                    1.8-2.4             
   









 Magnesium - 17 11:55                









 Magnesium                    1.5 mg/dL                    1.8-2.4             
   









 Clostridium difficile detection - 17 18:23                









 C DIFF MOLECULAR RESULT                    Negative for toxigenic C diff by 
DNA amplification                     NRG                









 Stool occult blood screen - 17 18:23                









 Stool gastrointestinal hemoglobin detection                    NEGATIVE       
              NEGATIVE                









 Stool bacteria identification by culture - 17 18:23                









 Stool bacteria identification by culture                    N2                
     NRG                









 Complete urinalysis with reflex to culture - 17 22:30                









 Urine color determination                    BROWN                     NRG    
            

 

 Urine clarity determination                    VERY CLOUDY                     
NRG                

 

 Urine pH measurement by test strip                    5                     5-
9                

 

 Specific gravity of urine by test strip                    1.015              
       1.016-1.022                

 

 Urine protein assay by test strip, semi-quantitative                    2+    
                 NEGATIVE                

 

 Urine glucose detection by automated test strip                    NEGATIVE   
                  NEGATIVE                

 

 Erythrocytes detection in urine sediment by light microscopy                  
  2+                     NEGATIVE                

 

 Urine ketones detection by automated test strip                    NEGATIVE   
                  NEGATIVE                

 

 Urine nitrite detection by test strip                    POSITIVE             
        NEGATIVE                

 

 Urine total bilirubin detection by test strip                    NEGATIVE     
                NEGATIVE                

 

 Urine urobilinogen measurement by automated test strip (mass/volume)          
          NORMAL                     NORMAL                

 

 Urine leukocyte esterase detection by dipstick                    3+          
           NEGATIVE                

 

 Automated urine sediment erythrocyte count by microscopy (number/high power 
field)                    NONE                     NRG                

 

 Automated urine sediment leukocyte count by microscopy (number/high power field
)                    TNTC                     NRG                

 

 Bacteria detection in urine sediment by light microscopy                    
LARGE                     NRG                

 

 Crystals detection in urine sediment by light microscopy                    
NONE                     NRG                

 

 Casts detection in urine sediment by light microscopy                    NONE 
                    NRG                

 

 Mucus detection in urine sediment by light microscopy                    SMALL
                     NRG                

 

 Complete urinalysis with reflex to culture                    YES             
        NRG                

 

 Renal epithelial cells detection in urine sediment by light microscopy        
            NONE                     NRG                









 Bacterial urine culture - 17 22:30                









 Bacterial urine culture                    SEE COMMEN                     NRG 
               

 

 COLONY COUNT                    .                     NRG                

 

 [facility];REPORTABLE                    SENSITIVITY REPORTED 6/20 09:00             
        NRG                









 Bacterial susceptibility panel - 17 22:30                









 Gentamicin susceptibility test by minimum inhibitory concentration            
        <=                    NRG                

 

 Trimethoprim/sulfamethoxazole susceptibility test by minimum 
inhibitoryconcentration                    <=                    NRG           
     

 

 Ampicillin susceptibility test by minimum inhibitory concentration            
        >=                    NRG                

 

 Tobramycin susceptibility test by minimum inhibitory concentration            
        <=                    NRG                

 

 Cefazolin susceptibility test by minimum inhibitory concentration             
       16                     NRG                

 

 Ceftriaxone susceptibility test by minimum inhibitory concentration           
         <=                    NRG                

 

 Ampicillin/sulbactam susceptibility test by minimum inhibitory concentration  
                  >=                    NRG                

 

 Piperacillin/tazobactam susceptibility test by minimum inhibitory 
concentration                    <=                    NRG                

 

 Ciprofloxacin susceptibility test by minimum inhibitory concentration         
           >=                    NRG                

 

 Meropenem susceptibility test by minimum inhibitory concentration             
       <=                    NRG                

 

 Nitrofurantoin susceptibility test by minimum inhibitory concentration        
            128                     NRG                

 

 Aztreonam susceptibility test by minimum inhibitory concentration             
       <=                    NRG                

 

 Extended spectrum beta lactamase (ESBL) producing bacteria susceptibility test 
by minimum inhibitory concentration                    -                     
NRG                









 Bacterial susceptibility panel - 17 22:30                









 Gentamicin susceptibility test by minimum inhibitory concentration            
        R                     NRG                

 

 Vancomycin susceptibility test by minimum inhibitory concentration            
        >=                    NRG                

 

 Levofloxacin susceptibility test by minimum inhibitory concentration          
          1                     NRG                

 

 Tetracycline susceptibility test by minimum inhibitory concentration          
          >=                    NRG                

 

 Ampicillin susceptibility test by minimum inhibitory concentration            
        <=                    NRG                

 

 Nitrofurantoin susceptibility test by minimum inhibitory concentration        
            <=                    NRG                









 Capillary blood glucose measurement by glucometer (mass/volume) - 17 14:
32                









 Capillary blood glucose measurement by glucometer (mass/volume)               
     335 mg/dL                                    









 Complete blood count (CBC) with automated white blood cell (WBC) differential 
- 17 15:45                









 Blood leukocytes automated count (number/volume)                    13.0 10*3/
uL                    4.3-11.0                

 

 Blood erythrocytes automated count (number/volume)                    3.77 10*6
/uL                    4.35-5.85                

 

 Venous blood hemoglobin measurement (mass/volume)                    10.1 g/dL
                    11.5-16.0                

 

 Blood hematocrit (volume fraction)                    31 %                    
35-52                

 

 Automated erythrocyte mean corpuscular volume                    81 [foz_us]  
                  80-99                

 

 Automated erythrocyte mean corpuscular hemoglobin (mass per erythrocyte)      
              27 pg                    25-34                

 

 Automated erythrocyte mean corpuscular hemoglobin concentration measurement (
mass/volume)                    33 g/dL                    32-36                

 

 Automated erythrocyte distribution width ratio                    18.4 %      
              10.0-14.5                

 

 Automated blood platelet count (count/volume)                    407 10*3/uL  
                  130-400                

 

 Automated blood platelet mean volume measurement                    11.1 [foz_
us]                    7.4-10.4                

 

 Automated blood neutrophils/100 leukocytes                    81 %            
        42-75                

 

 Automated blood lymphocytes/100 leukocytes                    13 %            
        12-44                

 

 Blood monocytes/100 leukocytes                    6 %                    0-12 
               

 

 Automated blood eosinophils/100 leukocytes                    1 %             
       0-10                

 

 Automated blood basophils/100 leukocytes                    0 %               
     0-10                

 

 Blood neutrophils automated count (number/volume)                    10.5 10*3
                    1.8-7.8                

 

 Blood lymphocytes automated count (number/volume)                    1.7 10*3 
                   1.0-4.0                

 

 Blood monocytes automated count (number/volume)                    0.8 10*3   
                 0.0-1.0                

 

 Automated eosinophil count                    0.1 10*3/uL                    
0.0-0.3                

 

 Automated blood basophil count (count/volume)                    0.0 10*3/uL  
                  0.0-0.1                









 PT panel in platelet poor plasma by coagulation assay - 17 15:45        
        









 Prothrombin time (PT) in platelet poor plasma by coagulation assay            
        17.1 s                    12.2-14.7                

 

 INR in platelet poor plasma or blood by coagulation assay                    
1.4                     0.8-1.4                









 Activated partial thromboplastin time (aPTT) in platelet poor plasma 
bycoagulation assay - 17 15:45                









 Activated partial thromboplastin time (aPTT) in platelet poor plasma 
bycoagulation assay                    26 s                    24-35           
     









 Comprehensive metabolic panel - 17 15:45                









 Serum or plasma sodium measurement (moles/volume)                    135 mmol/
L                    135-145                

 

 Serum or plasma potassium measurement (moles/volume)                    4.1 
mmol/L                    3.6-5.0                

 

 Serum or plasma chloride measurement (moles/volume)                    110 mmol
/L                                    

 

 Carbon dioxide                    18 mmol/L                    21-32          
      

 

 Serum or plasma anion gap determination (moles/volume)                    7 
mmol/L                    5-14                

 

 Serum or plasma urea nitrogen measurement (mass/volume)                    28 
mg/dL                    7-18                

 

 Serum or plasma creatinine measurement (mass/volume)                    2.37 mg
/dL                    0.60-1.30                

 

 Serum or plasma urea nitrogen/creatinine mass ratio                    12     
                NRG                

 

 Serum or plasma creatinine measurement with calculation of estimated 
glomerular filtration rate                    20                     NRG       
         

 

 Serum or plasma glucose measurement (mass/volume)                    317 mg/dL
                                    

 

 Serum or plasma calcium measurement (mass/volume)                    8.3 mg/dL
                    8.5-10.1                

 

 Serum or plasma total bilirubin measurement (mass/volume)                    
0.2 mg/dL                    0.1-1.0                

 

 Serum or plasma alkaline phosphatase measurement (enzymatic activity/volume)  
                  58 U/L                                    

 

 Serum or plasma aspartate aminotransferase measurement (enzymatic activity/
volume)                    16 U/L                    5-34                

 

 Serum or plasma alanine aminotransferase measurement (enzymatic activity/volume
)                    8 U/L                    0-55                

 

 Serum or plasma protein measurement (mass/volume)                    6.3 g/dL 
                   6.4-8.2                

 

 Serum or plasma albumin measurement (mass/volume)                    3.0 g/dL 
                   3.2-4.5                









 Magnesium - 17 15:45                









 Magnesium                    2.0 mg/dL                    1.8-2.4             
   









 Serum or plasma ethanol measurement (mass/volume) - 17 15:45            
    









 Serum or plasma ethanol measurement (mass/volume)                    < mg/dL  
                  <10                









 Urine drug screening test - 17 17:17                









 Urine phencyclidine detection by screening method                    NEGATIVE 
                    NEGATIVE                

 

 Urine benzodiazepines detection by screening method                    
NEGATIVE                     NEGATIVE                

 

 Urine cocaine detection                    NEGATIVE                     
NEGATIVE                

 

 Urine amphetamines detection by screening method                    NEGATIVE  
                   NEGATIVE                

 

 Urine methamphetamine detection by screening method                    
NEGATIVE                     NEGATIVE                

 

 Urine cannabinoids detection by screening method                    NEGATIVE  
                   NEGATIVE                

 

 Urine opiates detection by screening method                    NEGATIVE       
              NEGATIVE                

 

 Urine barbiturates detection                    NEGATIVE                     
NEGATIVE                

 

 Screening urine tricyclic antidepressants detection                    
NEGATIVE                     NEGATIVE                

 

 Urine methadone detection by screening method                    NEGATIVE     
                NEGATIVE                

 

 Urine oxycodone detection                    NEGATIVE                     
NEGATIVE                

 

 Urine propoxyphene detection                    NEGATIVE                     
NEGATIVE                









 Complete urinalysis with reflex to culture - 17 17:17                









 Urine color determination                    ESTEFANY                     NRG    
            

 

 Urine clarity determination                    SLIGHTLY CLOUDY                
     NRG                

 

 Urine pH measurement by test strip                    5                     5-
9                

 

 Specific gravity of urine by test strip                    1.020              
       1.016-1.022                

 

 Urine protein assay by test strip, semi-quantitative                    2+    
                 NEGATIVE                

 

 Urine glucose detection by automated test strip                    NEGATIVE   
                  NEGATIVE                

 

 Erythrocytes detection in urine sediment by light microscopy                  
  NEGATIVE                     NEGATIVE                

 

 Urine ketones detection by automated test strip                    NEGATIVE   
                  NEGATIVE                

 

 Urine nitrite detection by test strip                    POSITIVE             
        NEGATIVE                

 

 Urine total bilirubin detection by test strip                    NEGATIVE     
                NEGATIVE                

 

 Urine urobilinogen measurement by automated test strip (mass/volume)          
          NORMAL                     NORMAL                

 

 Urine leukocyte esterase detection by dipstick                    3+          
           NEGATIVE                

 

 Automated urine sediment erythrocyte count by microscopy (number/high power 
field)                    NONE                     NRG                

 

 Automated urine sediment leukocyte count by microscopy (number/high power field
)                     [HPF]                    NRG                

 

 Bacteria detection in urine sediment by light microscopy                    
MODERATE                     NRG                

 

 Squamous epithelial cells detection in urine sediment by light microscopy     
               5-10                     NRG                

 

 Crystals detection in urine sediment by light microscopy                    
NONE                     NRG                

 

 Casts detection in urine sediment by light microscopy                    NONE 
                    NRG                

 

 Mucus detection in urine sediment by light microscopy                    
NEGATIVE                     NRG                

 

 Complete urinalysis with reflex to culture                    YES             
        NRG                









 Bacterial urine culture - 17 17:17                









 Bacterial urine culture                    [id number]                     NRG  
              

 

 COLONY COUNT                    >100,000/ML                     NRG           
     

 

 [facility];REPORTABLE                    SENSITIVITY REPORTED 17 7:35            
         NRG                









 Bacterial susceptibility panel - 17 17:17                









 Gentamicin susceptibility test by minimum inhibitory concentration            
        >=                    NRG                

 

 Trimethoprim/sulfamethoxazole susceptibility test by minimum 
inhibitoryconcentration                    >=                    NRG           
     

 

 Ampicillin susceptibility test by minimum inhibitory concentration            
        >=                    NRG                

 

 Tobramycin susceptibility test by minimum inhibitory concentration            
        8                     NRG                

 

 Cefazolin susceptibility test by minimum inhibitory concentration             
       <=                    NRG                

 

 Ceftriaxone susceptibility test by minimum inhibitory concentration           
         <=                    NRG                

 

 Ampicillin/sulbactam susceptibility test by minimum inhibitory concentration  
                  >=                    NRG                

 

 Piperacillin/tazobactam susceptibility test by minimum inhibitory 
concentration                    <=                    NRG                

 

 Ciprofloxacin susceptibility test by minimum inhibitory concentration         
           >=                    NRG                

 

 Meropenem susceptibility test by minimum inhibitory concentration             
       <=                    NRG                

 

 Nitrofurantoin susceptibility test by minimum inhibitory concentration        
            128                     NRG                

 

 Aztreonam susceptibility test by minimum inhibitory concentration             
       <=                    NRG                

 

 Extended spectrum beta lactamase (ESBL) producing bacteria susceptibility test 
by minimum inhibitory concentration                    -                     
NRG                

 

 Amikacin susceptibility test by minimum inhibitory concentration              
      S                     NRG                









 Capillary blood glucose measurement by glucometer (mass/volume) - 17 19:
59                









 Capillary blood glucose measurement by glucometer (mass/volume)               
     200 mg/dL                                    









 Capillary blood glucose measurement by glucometer (mass/volume) - 17 05:
07                









 Capillary blood glucose measurement by glucometer (mass/volume)               
     161 mg/dL                                    









 Complete blood count (CBC) with automated white blood cell (WBC) differential 
- 17 08:09                









 Blood leukocytes automated count (number/volume)                    10.2 10*3/
uL                    4.3-11.0                

 

 Blood erythrocytes automated count (number/volume)                    3.55 10*6
/uL                    4.35-5.85                

 

 Venous blood hemoglobin measurement (mass/volume)                    9.3 g/dL 
                   11.5-16.0                

 

 Blood hematocrit (volume fraction)                    29 %                    
35-52                

 

 Automated erythrocyte mean corpuscular volume                    82 [foz_us]  
                  80-99                

 

 Automated erythrocyte mean corpuscular hemoglobin (mass per erythrocyte)      
              26 pg                    25-34                

 

 Automated erythrocyte mean corpuscular hemoglobin concentration measurement (
mass/volume)                    32 g/dL                    32-36                

 

 Automated erythrocyte distribution width ratio                    18.6 %      
              10.0-14.5                

 

 Automated blood platelet count (count/volume)                    422 10*3/uL  
                  130-400                

 

 Automated blood platelet mean volume measurement                    10.6 [foz_
us]                    7.4-10.4                

 

 Automated blood neutrophils/100 leukocytes                    77 %            
        42-75                

 

 Automated blood lymphocytes/100 leukocytes                    16 %            
        12-44                

 

 Blood monocytes/100 leukocytes                    6 %                    0-12 
               

 

 Automated blood eosinophils/100 leukocytes                    1 %             
       0-10                

 

 Automated blood basophils/100 leukocytes                    0 %               
     0-10                

 

 Blood neutrophils automated count (number/volume)                    7.9 10*3 
                   1.8-7.8                

 

 Blood lymphocytes automated count (number/volume)                    1.6 10*3 
                   1.0-4.0                

 

 Blood monocytes automated count (number/volume)                    0.6 10*3   
                 0.0-1.0                

 

 Automated eosinophil count                    0.1 10*3/uL                    
0.0-0.3                

 

 Automated blood basophil count (count/volume)                    0.0 10*3/uL  
                  0.0-0.1                









 Comprehensive metabolic panel - 17 08:09                









 Serum or plasma sodium measurement (moles/volume)                    138 mmol/
L                    135-145                

 

 Serum or plasma potassium measurement (moles/volume)                    4.0 
mmol/L                    3.6-5.0                

 

 Serum or plasma chloride measurement (moles/volume)                    113 mmol
/L                                    

 

 Carbon dioxide                    17 mmol/L                    21-32          
      

 

 Serum or plasma anion gap determination (moles/volume)                    8 
mmol/L                    5-14                

 

 Serum or plasma urea nitrogen measurement (mass/volume)                    28 
mg/dL                    7-18                

 

 Serum or plasma creatinine measurement (mass/volume)                    2.19 mg
/dL                    0.60-1.30                

 

 Serum or plasma urea nitrogen/creatinine mass ratio                    13     
                NRG                

 

 Serum or plasma creatinine measurement with calculation of estimated 
glomerular filtration rate                    22                     NRG       
         

 

 Serum or plasma glucose measurement (mass/volume)                    155 mg/dL
                                    

 

 Serum or plasma calcium measurement (mass/volume)                    8.7 mg/dL
                    8.5-10.1                

 

 Serum or plasma total bilirubin measurement (mass/volume)                    
0.4 mg/dL                    0.1-1.0                

 

 Serum or plasma alkaline phosphatase measurement (enzymatic activity/volume)  
                  56 U/L                                    

 

 Serum or plasma aspartate aminotransferase measurement (enzymatic activity/
volume)                    10 U/L                    5-34                

 

 Serum or plasma alanine aminotransferase measurement (enzymatic activity/volume
)                    10 U/L                    0-55                

 

 Serum or plasma protein measurement (mass/volume)                    6.4 g/dL 
                   6.4-8.2                

 

 Serum or plasma albumin measurement (mass/volume)                    3.2 g/dL 
                   3.2-4.5                









 Magnesium - 17 08:09                









 Magnesium                    1.5 mg/dL                    1.8-2.4             
   









 Capillary blood glucose measurement by glucometer (mass/volume) - 17 12:
01                









 Capillary blood glucose measurement by glucometer (mass/volume)               
     186 mg/dL                                    









 Capillary blood glucose measurement by glucometer (mass/volume) - 17 16:
47                









 Capillary blood glucose measurement by glucometer (mass/volume)               
     209 mg/dL                                    









 Capillary blood glucose measurement by glucometer (mass/volume) - 17 20:
57                









 Capillary blood glucose measurement by glucometer (mass/volume)               
     120 mg/dL                                    









 Capillary blood glucose measurement by glucometer (mass/volume) - 17 05:
21                









 Capillary blood glucose measurement by glucometer (mass/volume)               
     121 mg/dL                                    









 Complete blood count (CBC) with automated white blood cell (WBC) differential 
- 17 06:33                









 Blood leukocytes automated count (number/volume)                    13.5 10*3/
uL                    4.3-11.0                

 

 Blood erythrocytes automated count (number/volume)                    3.85 10*6
/uL                    4.35-5.85                

 

 Venous blood hemoglobin measurement (mass/volume)                    10.2 g/dL
                    11.5-16.0                

 

 Blood hematocrit (volume fraction)                    32 %                    
35-52                

 

 Automated erythrocyte mean corpuscular volume                    83 [foz_us]  
                  80-99                

 

 Automated erythrocyte mean corpuscular hemoglobin (mass per erythrocyte)      
              27 pg                    25-34                

 

 Automated erythrocyte mean corpuscular hemoglobin concentration measurement (
mass/volume)                    32 g/dL                    32-36                

 

 Automated erythrocyte distribution width ratio                    19.4 %      
              10.0-14.5                

 

 Automated blood platelet count (count/volume)                    444 10*3/uL  
                  130-400                

 

 Automated blood platelet mean volume measurement                    10.9 [foz_
us]                    7.4-10.4                

 

 Automated blood neutrophils/100 leukocytes                    76 %            
        42-75                

 

 Automated blood lymphocytes/100 leukocytes                    16 %            
        12-44                

 

 Blood monocytes/100 leukocytes                    7 %                    0-12 
               

 

 Automated blood eosinophils/100 leukocytes                    1 %             
       0-10                

 

 Automated blood basophils/100 leukocytes                    0 %               
     0-10                

 

 Blood neutrophils automated count (number/volume)                    10.3 10*3
                    1.8-7.8                

 

 Blood lymphocytes automated count (number/volume)                    2.1 10*3 
                   1.0-4.0                

 

 Blood monocytes automated count (number/volume)                    1.0 10*3   
                 0.0-1.0                

 

 Automated eosinophil count                    0.1 10*3/uL                    
0.0-0.3                

 

 Automated blood basophil count (count/volume)                    0.0 10*3/uL  
                  0.0-0.1                









 Comprehensive metabolic panel - 17 06:33                









 Serum or plasma sodium measurement (moles/volume)                    140 mmol/
L                    135-145                

 

 Serum or plasma potassium measurement (moles/volume)                    4.2 
mmol/L                    3.6-5.0                

 

 Serum or plasma chloride measurement (moles/volume)                    116 mmol
/L                                    

 

 Carbon dioxide                    11 mmol/L                    21-32          
      

 

 Serum or plasma anion gap determination (moles/volume)                    13 
mmol/L                    5-14                

 

 Serum or plasma urea nitrogen measurement (mass/volume)                    26 
mg/dL                    7-18                

 

 Serum or plasma creatinine measurement (mass/volume)                    2.02 mg
/dL                    0.60-1.30                

 

 Serum or plasma urea nitrogen/creatinine mass ratio                    13     
                NRG                

 

 Serum or plasma creatinine measurement with calculation of estimated 
glomerular filtration rate                    24                     NRG       
         

 

 Serum or plasma glucose measurement (mass/volume)                    142 mg/dL
                                    

 

 Serum or plasma calcium measurement (mass/volume)                    8.6 mg/dL
                    8.5-10.1                

 

 Serum or plasma total bilirubin measurement (mass/volume)                    
0.3 mg/dL                    0.1-1.0                

 

 Serum or plasma alkaline phosphatase measurement (enzymatic activity/volume)  
                  60 U/L                                    

 

 Serum or plasma aspartate aminotransferase measurement (enzymatic activity/
volume)                    13 U/L                    5-34                

 

 Serum or plasma alanine aminotransferase measurement (enzymatic activity/volume
)                    9 U/L                    0-55                

 

 Serum or plasma protein measurement (mass/volume)                    6.4 g/dL 
                   6.4-8.2                

 

 Serum or plasma albumin measurement (mass/volume)                    3.1 g/dL 
                   3.2-4.5                









 Capillary blood glucose measurement by glucometer (mass/volume) - 17 10:
11                









 Capillary blood glucose measurement by glucometer (mass/volume)               
     153 mg/dL                                    









 Capillary blood glucose measurement by glucometer (mass/volume) - 17 15:
41                









 Capillary blood glucose measurement by glucometer (mass/volume)               
     147 mg/dL                                    









 Capillary blood glucose measurement by glucometer (mass/volume) - 17 19:
45                









 Capillary blood glucose measurement by glucometer (mass/volume)               
     236 mg/dL                                    



                                                                               
                                                                               
                                                                               
                                                                               
                                                                               
                                                                               
                                                                               
                                                                 



Encounters

                      





 ACCT No.                    Visit Date/Time                    Discharge      
              Status                    Pt. Type                    Provider   
                 Facility                    Loc./Unit                    
Complaint                

 

 [id number]                    2017 15:20:00                    2017 15:40:00                    DIS                    Inpatient              
      RODY MOSER DO                    [facility]                    4TH                    ACUTE RENAL FAILURE,
DIARRHEA                

 

 [id number]                    2017 09:20:00                    2017 12:48:00                    DIS                    Emergency              
      CESAR MILLS MD                    [facility]   
                 ER                    FALL                

 

 [id number]                    2017 10:23:00                    2017 12:30:00                    DIS                    Emergency              
      VICTORINA LUNSFORD DO                    [facility]      
              ER                    FALL/HEAD INJURY                

 

 [id number]                    2015 16:11:00                    2015 18:03:00                    DIS                    Emergency              
      COLLEEN HUGHES                    [facility]
                    ER                    R HIP PAIN                

 

 [id number]                    2014 15:35:00                    2014 23:59:59                    CLS                    Outpatient             
       RODY MOSER DO                    [facility]                    RAD                    NECK PAIN                

 

 [id number]                    2014 14:43:00                    2014 16:32:00                    DIS                    Emergency              
      VICTORINA LUNSFORD DO                    [facility]      
              ER                    BACK/LEFT HIP PAIN                

 

 [id number]                    08/10/2013 01:41:00                    08/10/
2013 02:23:00                    DIS                    Emergency              
      RAMONA BOWEN MD                    [facility]   
                 ER                    INJ AT HOME                

 

 [id number]                    2017 17:30:00                           
              ACT                    Inpatient                    RODY MOSER DO                    [facility]                  
  4TH                    ACUTE RENAL FAILURE,FALL                

 

 [id number]                    2017 22:30:00                           
              ACT                    Outpatient                    RODY MOSER DO                    [facility]                
    LABNPT                    N19.5                

 

 [id number]                    2017 20:15:00                           
              ACT                    Outpatient                    RODY MOSER DO                    [facility]                
    LABNPT                    R19.7                

 

 [id number]                    2017 15:07:00                           
              ACT                    Outpatient                    RODY MOSER DO                    [facility]                
    LABNPT                    K59.00, E78.4, E56.9                

 

 [id number]                    2017 23:44:00                           
              ACT                    Outpatient                    RODY MOSER DO                    [facility]                
    LABNPT                    R39.9                

 

 [id number]                    2015 16:11:00                           
                                   Document Registration                       
                                                                             

 

 [id number]                    2013 11:07:00                           
                                   Document Registration                       
                                                                             

 

 [id number]                    2012 16:00:00                           
                                   Document Registration                       
                                                                             

 

 [id number]                    2012 21:16:00                           
                                   Document Registration                       
                                                                             

 

 [id number]                    2012 21:35:00                           
                                   Document Registration

## 2017-07-11 NOTE — PROGRESS NOTE (SOAP)
Subjective


Time Seen by Provider:  07:45


Subjective/Events-last exam


patient doing better today.


UA shows bladder TIA resolved.


Kidney function good.


Patient voices no complaints.


COPD.


Diabetes.


CAD.


Hypertension.


Plan to discharge today





Objective


Exam





Vital Signs








  Date Time  Temp Pulse Resp B/P (MAP) Pulse Ox O2 Delivery O2 Flow Rate FiO2


 


17 07:33     94 Room Air  


 


17 00:28 97.6 101 16 140/68 93 Room Air  


 


7/10/17 23:06      Room Air  


 


7/10/17 20:00     93 Room Air  


 


7/10/17 17:25 97.8 94 20 136/63 95 Room Air  


 


7/10/17 08:21 98.4 91 20 145/75 94 Room Air  


 


7/10/17 08:00     94 Room Air  














I & O 


 


 17





 07:00


 


Intake Total 1240 ml


 


Balance 1240 ml





Capillary Refill : Less Than 3 Seconds


General Appearance:  No Apparent Distress, WD/WN


HEENT:  Normal ENT Inspection


Neck:  Full Range of Motion, Normal Inspection


Respiratory:  Chest Non Tender, Normal Breath Sounds, No Accessory Muscle Use, 

No Respiratory Distress


Cardiovascular:  Regular Rate, Rhythm, No JVD


Gastrointestinal:  non tender, soft





Results


Lab


Laboratory Tests


17 05:50








Laboratory Tests


7/10/17 10:48: Glucometer 222H


7/10/17 14:31: Glucometer 162H


7/10/17 18:35: Glucometer 151H


7/10/17 19:55: 


Urine Color YELLOW, Urine Clarity CLEAR, Urine pH 5, Urine Specific Gravity 

1.015L, Urine Protein 1+H, Urine Glucose (UA) NEGATIVE, Urine Ketones NEGATIVE, 

Urine Nitrite NEGATIVE, Urine Bilirubin NEGATIVE, Urine Urobilinogen NORMAL, 

Urine Leukocyte Esterase NEGATIVE, Urine RBC (Auto) NEGATIVE, Urine RBC NONE, 

Urine WBC NONE, Urine Squamous Epithelial Cells 0-2, Urine Crystals NONE, Urine 

Bacteria NONE, Urine Casts NONE, Urine Mucus NEGATIVE, Urine Culture Indicated 

NO


17 05:50: 


White Blood Count 9.8, Red Blood Count 3.09L, Hemoglobin 8.1L, Hematocrit 26L, 

Mean Corpuscular Volume 84, Mean Corpuscular Hemoglobin 26, Mean Corpuscular 

Hemoglobin Concent 31L, Red Cell Distribution Width 19.8H, Platelet Count 360, 

Mean Platelet Volume 10.6H, Sodium Level 145, Potassium Level 3.4L, Chloride 

Level 123H, Carbon Dioxide Level 16L, Anion Gap 6, Blood Urea Nitrogen 10, 

Creatinine 0.89, Estimat Glomerular Filtration Rate > 60, BUN/Creatinine Ratio 

11, Glucose Level 112H, Calcium Level 8.0L, Magnesium Level 1.8, Total 

Bilirubin 0.4, Aspartate Amino Transf (AST/SGOT) 10, Alanine Aminotransferase (

ALT/SGPT) < 6, Alkaline Phosphatase 48, Total Protein 4.8L, Albumin 2.3L


17 05:59: Glucometer 106





Microbiology


17 Urine Culture - Final, Complete


         Escherichia Coli





Assessment/Plan


Assessment/Plan


Assess & Plan/Chief Complaint


acute renal failure resolving.


Diarrhea checking for C. difficile.


Anemia.


UTI checking today.


COPD.


Diabetes.


Hypertension history.


Coronary artery disease.


Hypomagnesemia.


Patient feeling better.


.


17.


Acute renal failure resolved.


UA shows infection gone.


Fall.


Diarrhea waiting for C. difficile.


Anemia.


COPD.


Patient feeling better today.


Plan to discharge today





Clinical Quality Measures


DVT/VTE Risk/Contraindication:


Risk Factor Score Per Nursin


RFS Level Per Nursing on Admit:  4+=Very High











RODY MOSER DO 2017 07:54

## 2017-07-24 ENCOUNTER — HOSPITAL ENCOUNTER (OUTPATIENT)
Dept: HOSPITAL 75 - ER | Age: 67
Setting detail: OBSERVATION
LOS: 3 days | Discharge: NURSING, REHAB OR LONG-TERM CARE | End: 2017-07-27
Attending: FAMILY MEDICINE | Admitting: FAMILY MEDICINE
Payer: MEDICARE

## 2017-07-24 VITALS — WEIGHT: 172.12 LBS | BODY MASS INDEX: 32.5 KG/M2 | HEIGHT: 61 IN

## 2017-07-24 DIAGNOSIS — A41.9: Primary | ICD-10-CM

## 2017-07-24 DIAGNOSIS — I10: ICD-10-CM

## 2017-07-24 DIAGNOSIS — S89.92XA: ICD-10-CM

## 2017-07-24 DIAGNOSIS — N39.0: ICD-10-CM
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
## 2017-07-24 DIAGNOSIS — I25.10: ICD-10-CM
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
## 2017-07-24 DIAGNOSIS — Z79.899: ICD-10-CM

## 2017-07-24 DIAGNOSIS — F17.210: ICD-10-CM

## 2017-07-24 DIAGNOSIS — Z79.02: ICD-10-CM

## 2017-07-24 DIAGNOSIS — E11.9: ICD-10-CM

## 2017-07-24 DIAGNOSIS — M47.812: ICD-10-CM

## 2017-07-24 DIAGNOSIS — S89.91XA: ICD-10-CM

## 2017-07-24 DIAGNOSIS — S29.9XXA: ICD-10-CM

## 2017-07-24 DIAGNOSIS — E87.6: ICD-10-CM

## 2017-07-24 DIAGNOSIS — J44.9: ICD-10-CM

## 2017-07-24 DIAGNOSIS — M16.11: ICD-10-CM

## 2017-07-24 DIAGNOSIS — E83.42: ICD-10-CM
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
## 2017-07-24 LAB
ALBUMIN SERPL-MCNC: 3.1 GM/DL (ref 3.2–4.5)
ALT SERPL-CCNC: 16 U/L (ref 0–55)
AMMONIA PLAS-SCNC: 25 UMOL/L (ref 11–32)
ANION GAP SERPL CALC-SCNC: 13 MMOL/L (ref 5–14)
AST SERPL-CCNC: 27 U/L (ref 5–34)
BASOPHILS # BLD AUTO: 0 10^3/UL (ref 0–0.1)
BASOPHILS NFR BLD AUTO: 0 % (ref 0–10)
BILIRUB SERPL-MCNC: 0.4 MG/DL (ref 0.1–1)
BILIRUB UR QL STRIP: NEGATIVE
BUN SERPL-MCNC: 11 MG/DL (ref 7–18)
BUN/CREAT SERPL: 10
CALCIUM SERPL-MCNC: 8.7 MG/DL (ref 8.5–10.1)
CHLORIDE SERPL-SCNC: 106 MMOL/L (ref 98–107)
CO2 SERPL-SCNC: 19 MMOL/L (ref 21–32)
CREAT SERPL-MCNC: 1.09 MG/DL (ref 0.6–1.3)
EOSINOPHIL # BLD AUTO: 0.2 10^3/UL (ref 0–0.3)
EOSINOPHIL NFR BLD AUTO: 3 % (ref 0–10)
ERYTHROCYTE [DISTWIDTH] IN BLOOD BY AUTOMATED COUNT: 18.4 % (ref 10–14.5)
GFR SERPLBLD BASED ON 1.73 SQ M-ARVRAT: 50 ML/MIN
GLUCOSE SERPL-MCNC: 220 MG/DL (ref 70–105)
HS C REACTIVE PROTEIN: 0.9 MG/DL (ref 0–0.5)
HYALINE CASTS #/AREA URNS LPF: (no result) /LPF
KETONES UR QL STRIP: NEGATIVE
LEUKOCYTE ESTERASE UR QL STRIP: (no result)
LYMPHOCYTES # BLD AUTO: 1.4 X 10^3 (ref 1–4)
LYMPHOCYTES NFR BLD AUTO: 19 % (ref 12–44)
MAGNESIUM SERPL-MCNC: 1.7 MG/DL (ref 1.8–2.4)
MCH RBC QN AUTO: 27 PG (ref 25–34)
MCHC RBC AUTO-ENTMCNC: 31 G/DL (ref 32–36)
MCV RBC AUTO: 85 FL (ref 80–99)
MONOCYTES # BLD AUTO: 0.6 X 10^3 (ref 0–1)
MONOCYTES NFR BLD AUTO: 9 % (ref 0–12)
NEUTROPHILS # BLD AUTO: 5.1 X 10^3 (ref 1.8–7.8)
NEUTROPHILS NFR BLD AUTO: 70 % (ref 42–75)
NITRITE UR QL STRIP: NEGATIVE
PH UR STRIP: 5 [PH] (ref 5–9)
PLATELET # BLD: 572 10^3/UL (ref 130–400)
PMV BLD AUTO: 10.6 FL (ref 7.4–10.4)
POTASSIUM SERPL-SCNC: 3.4 MMOL/L (ref 3.6–5)
PROT SERPL-MCNC: 6.9 GM/DL (ref 6.4–8.2)
PROT UR QL STRIP: (no result)
RBC # BLD AUTO: 3.95 10^6/UL (ref 4.35–5.85)
SODIUM SERPL-SCNC: 138 MMOL/L (ref 135–145)
SP GR UR STRIP: 1.01 (ref 1.02–1.02)
SQUAMOUS #/AREA URNS HPF: (no result) /HPF
TROPONIN I SERPL-MCNC: < 0.3 NG/ML (ref ?–0.3)
UROBILINOGEN UR-MCNC: NORMAL MG/DL
WBC # BLD AUTO: 7.3 10^3/UL (ref 4.3–11)
YEAST #/AREA URNS HPF: (no result) /HPF

## 2017-07-24 PROCEDURE — 83735 ASSAY OF MAGNESIUM: CPT

## 2017-07-24 PROCEDURE — 80048 BASIC METABOLIC PNL TOTAL CA: CPT

## 2017-07-24 PROCEDURE — 96366 THER/PROPH/DIAG IV INF ADDON: CPT

## 2017-07-24 PROCEDURE — 82140 ASSAY OF AMMONIA: CPT

## 2017-07-24 PROCEDURE — 85027 COMPLETE CBC AUTOMATED: CPT

## 2017-07-24 PROCEDURE — 96361 HYDRATE IV INFUSION ADD-ON: CPT

## 2017-07-24 PROCEDURE — 36415 COLL VENOUS BLD VENIPUNCTURE: CPT

## 2017-07-24 PROCEDURE — 80053 COMPREHEN METABOLIC PANEL: CPT

## 2017-07-24 PROCEDURE — 96365 THER/PROPH/DIAG IV INF INIT: CPT

## 2017-07-24 PROCEDURE — 83605 ASSAY OF LACTIC ACID: CPT

## 2017-07-24 PROCEDURE — 86141 C-REACTIVE PROTEIN HS: CPT

## 2017-07-24 PROCEDURE — 73000 X-RAY EXAM OF COLLAR BONE: CPT

## 2017-07-24 PROCEDURE — 73502 X-RAY EXAM HIP UNI 2-3 VIEWS: CPT

## 2017-07-24 PROCEDURE — 85025 COMPLETE CBC W/AUTO DIFF WBC: CPT

## 2017-07-24 PROCEDURE — 87324 CLOSTRIDIUM AG IA: CPT

## 2017-07-24 PROCEDURE — 70450 CT HEAD/BRAIN W/O DYE: CPT

## 2017-07-24 PROCEDURE — 71010: CPT

## 2017-07-24 PROCEDURE — 96367 TX/PROPH/DG ADDL SEQ IV INF: CPT

## 2017-07-24 PROCEDURE — 84484 ASSAY OF TROPONIN QUANT: CPT

## 2017-07-24 PROCEDURE — 96375 TX/PRO/DX INJ NEW DRUG ADDON: CPT

## 2017-07-24 PROCEDURE — 87449 NOS EACH ORGANISM AG IA: CPT

## 2017-07-24 PROCEDURE — 70553 MRI BRAIN STEM W/O & W/DYE: CPT

## 2017-07-24 PROCEDURE — 72125 CT NECK SPINE W/O DYE: CPT

## 2017-07-24 PROCEDURE — 87088 URINE BACTERIA CULTURE: CPT

## 2017-07-24 PROCEDURE — 81000 URINALYSIS NONAUTO W/SCOPE: CPT

## 2017-07-24 RX ADMIN — POTASSIUM CHLORIDE SCH MLS/HR: 200 INJECTION, SOLUTION INTRAVENOUS at 22:48

## 2017-07-24 RX ADMIN — POTASSIUM CHLORIDE SCH MLS/HR: 200 INJECTION, SOLUTION INTRAVENOUS at 23:59

## 2017-07-24 NOTE — ED FALL/INJURY
General


Chief Complaint:  Trauma-Non Activation


Stated Complaint:  FALL


Nursing Triage Note:  


FOUND ON GROUND IN NURSING HOME BY STAFF. C/O BILATERAL KNEE PAIN. DENIES OTHER 


COMPLAINTS AT THIS TIME. NO LOC REPORTED. BROUGHT IN BY [facility]


Source:  patient, EMS


Exam Limitations:  no limitations





History of Present Illness


Time seen by provider:  21:18


Initial Comments


Patient brought to your by EMS with chief complaint from the nursing home of a 

fall while in her room. Patient and EMS states she reports she fell on her 

knees but did not strike her head nor pass out. Patient is having some neck 

pain as well as bilateral knee pain and right hip pain. She also claims that 

her left clavicle is hurting. She denies any shortness of breath however says 

she's had more cough lately. Her cough is been nonproductive. She is not having 

any fevers chills, malaise. She is however having nausea. She denies any chest 

pain or shortness of breath. She has a history of diabetes blood pressure and 

no heart history.


Location Injury Occurred:  [facility] [facility]





Allergies and Home Medications


Allergies


Coded Allergies:  


     morphine (Unverified  Allergy, Mild, 14)


 "ACTS WEIRD"


     Penicillins (Unverified  Allergy, Unknown, 08)





Home Medications


Acetaminophen 650 Mg Tablet.er, 650 MG PO Q4H PRN for PAIN-MILD, (Reported)


   NOT TO EXCEED 4 GRAMS PER DAY  


Albuterol Sulfate 18 Gm Hfa.aer.ad, 2 PUFF IH Q8H PRN for SHORTNESS OF BREATH, (

Reported)


Amlodipine Besylate 5 Mg Tablet, 5 MG PO DAILY, (Reported)


Atorvastatin Calcium 40 Mg Tablet, 40 MG PO HS, (Reported)


Clonazepam 0.5 Mg Tab.rapdis, 0.5 MG PO Q12H PRN for ANXIETY, (Reported)


Clopidogrel Bisulfate 75 Mg Tablet, 75 MG PO DAILY, (Reported)


Diphenhydramine HCl 25 Mg Capsule, 25 MG PO Q8H PRN for ITCHING, (Reported)


Docusate Sodium 100 Mg Capsule, 100 MG PO Q12H PRN for CONSTIPATION-1ST LINE, (

Reported)


Escitalopram Oxalate 10 Mg Tablet, 10 MG PO DAILY, (Reported)


Famotidine 20 Mg Tablet, 20 MG PO BID, (Reported)


Ferrous Sulfate 325 Mg Tablet, 325 MG PO DAILY, (Reported)


Guaifenesin 100 Mg/5 Ml Liquid, 10 ML PO Q4H PRN for COUGH, (Reported)


Magnesium Hydroxide 400 Mg/5 Ml Oral.susp, 30 ML PO Q12H PRN for CONSTIPATION-

7TH LINE, (Reported)


Magnesium Oxide 400 Mg Tablet, 400 MG PO BID, (Reported)


Menthol/Camphor 56 Gm Cream..g., TP Q6H PRN for JOINT PAIN, (Reported)


Metronidazole 500 Mg Tablet, 500 MG PO TID for 10 Days


   Prescribed by: BINTA CHAUDHARY on 17 0845


Multivits,Stress Formula/Zinc 1 Each Tablet, 1 TAB PO DAILY, (Reported)


Nut.tx.impaired Digest Fxn 200 Ml Liquid, 200 ML PO TID, (Reported)


Nystatin 100,000 Unit/1 Ml Oral.susp, 5 ML PO BID for 10 Days, (Reported)


   10 DAY SUPPLY START 17 


Nystatin/Triamcinolone 15 Gm Cr, TP TID, (Reported)


Ondansetron HCl 4 Mg Tab, 4 MG PO Q6H PRN for NAUSEA/VOMITING-1ST LINE, (

Reported)


Potassium Chloride 10 Meq Tablet.er, 10 MEQ PO BID, (Reported)


Protein Supplement 946 Ml Liquid, 30 ML PO DAILY, (Reported)


Scopolamine 1 Each Patch.td72, 1 PATCH TD Q72H PRN for N/V, (Reported)


Tramadol HCl 50 Mg Tablet, 50 MG PO Q12H PRN for PAIN-MODERATE, (Reported)


[Barrier Cream]  , TOP BID, (Reported)


   APPLY TO BILATERAL BUTTOCKS FOR PREVENTATIVE MEASURES 





Constitutional:  No chills, No diaphoresis, No fever, malaise


Eyes:  Denies Blindness, Denies Blurred Vision


Ears, Nose, Mouth, Throat:  denies ear pain, denies nose discharge


Respiratory:  cough, No short of breath


Cardiovascular:  No chest pain, No edema, No palpitations, No syncope


Gastrointestinal:  No constipation, No diarrhea, nausea, No vomiting


Genitourinary:  No discharge, No dysuria


Pregnant:  No


Musculoskeletal:  see HPI


Skin:  No pruritus, No rash


Psychiatric/Neurological:  Denies Headache, Denies Numbness





Past Medical-Social-Family Hx


Patient Social History


Alcohol Use:  Denies Use


Recreational Drug Use:  No


Smoking Status:  Current Everyday Smoker


Type Used:  Cigarettes


2nd Hand Smoke Exposure:  No


Recent Hopitalizations:  Yes





Immunizations Up To Date


Tetanus Booster (TDap):  Unknown





Seasonal Allergies


Seasonal Allergies:  No





Surgeries


HX Surgeries:  Yes (CATARACTS)


Surgeries:   Section, Eye Surgery, Gallbladder





Respiratory


Hx Respiratory Disorders:  Yes


Respiratory Disorders:  COPD





Cardiovascular


Hx Cardiac Disorders:  Yes


Cardiac Disorders:  Coronary Artery Disease, High Cholesterol, Hypertension





Neurological


Hx Neurological Disorders:  Yes


Neurological Disorders:  Stroke





Reproductive System


Hx Reproductive Disorders:  No





Genitourinary


Hx Genitourinary Disorders:  Yes (INCONTINENT)





Gastrointestinal


Hx Gastrointestinal Disorders:  Yes


Gastrointestinal Disorders:  Chronic Constipation





Musculoskeletal


Hx Musculoskeletal Disorders:  Yes (CHRONIC NECK AND BACK PAIN ; UNSTEADY GAIT 

AND GENERALIZED WEAKNESS)


Musculoskeletal Disorders:  Arthritis, Chronic Back Pain





Endocrine


Hx Endocrine Disorders:  Yes


Endocrine Disorders:  Diabetes, Non-Insulin dep





Cancer


Hx Cancer:  No





Psychosocial


Hx Psychiatric Problems:  Yes


Behavioral Health Disorders:  Depression





Integumentary


HX Skin/Integumentary Disorder:  No





Blood Transfusions


Hx Blood Disorders:  No


Adverse Reaction to a Blood Tr:  No





Family Medical History


Significant Family History:  No Pertinent Family Hx





Physical Exam


Vital Signs





Vital Sign - Last 12Hours








 17





 21:20


 


Temp 99.6


 


Pulse 110


 


Resp 18


 


B/P (MAP) 167/75


 


Pulse Ox 96


 


O2 Delivery Room Air





Capillary Refill :


General Appearance:  WD/WN, mild distress, other (images without opening eyes 

and mumbles)


HEENT:  PERRL/EOMI, TMs normal, other (oropharynx is very dry)


Neck:  non-tender, supple, normal inspection


Cardiovascular:  normal peripheral pulses, regular rate, rhythm, no edema


Respiratory:  chest non-tender, lungs clear, normal breath sounds


Peripheral Pulses:  2+ Dorsalis Pedis (R), 2+ Left Dors-Pedis (L), 2+ Radial 

Pulses (R), 2+ Radial Pulses (L)


Gastrointestinal:  normal bowel sounds, non tender, soft


Back:  normal inspection, no vertebral tenderness


Extremities:  no pedal edema, no calf tenderness, normal capillary refill, 

other (Bilateral knees tender and tender this on greater trochanter right hip)


Neurologic/Psychiatric:  CNs II-XII nml as tested, no motor/sensory deficits, 

alert, oriented x 3, other (flat affect)


Skin:  normal color, warm/dry





Arpit Coma Score


Best Eye Response:  (3) Open to Voice


Best Verbal Response:  (5) Oriented


Best Motor Response:  (6) Obeys Commands


[place] Total:  14





Progress/Results/Core Measures


Results/Orders


Lab Results





Laboratory Tests








Test


  7/24/17


21:45 17


23:03 Range/Units


 


 


White Blood Count


  7.3 


  


  4.3-11.0


10^3/uL


 


Red Blood Count


  3.95 L


  


  4.35-5.85


10^6/uL


 


Hemoglobin 10.5 L  11.5-16.0  G/DL


 


Hematocrit 34 L  35-52  %


 


Mean Corpuscular Volume 85   80-99  FL


 


Mean Corpuscular Hemoglobin 27   25-34  PG


 


Mean Corpuscular Hemoglobin


Concent 31 L


  


  32-36  G/DL


 


 


Red Cell Distribution Width 18.4 H  10.0-14.5  %


 


Platelet Count


  572 H


  


  130-400


10^3/uL


 


Mean Platelet Volume 10.6 H  7.4-10.4  FL


 


Neutrophils (%) (Auto) 70   42-75  %


 


Lymphocytes (%) (Auto) 19   12-44  %


 


Monocytes (%) (Auto) 9   0-12  %


 


Eosinophils (%) (Auto) 3   0-10  %


 


Basophils (%) (Auto) 0   0-10  %


 


Neutrophils # (Auto) 5.1   1.8-7.8  X 10^3


 


Lymphocytes # (Auto) 1.4   1.0-4.0  X 10^3


 


Monocytes # (Auto) 0.6   0.0-1.0  X 10^3


 


Eosinophils # (Auto)


  0.2 


  


  0.0-0.3


10^3/uL


 


Basophils # (Auto)


  0.0 


  


  0.0-0.1


10^3/uL


 


Sodium Level 138   135-145  MMOL/L


 


Potassium Level 3.4 L  3.6-5.0  MMOL/L


 


Chloride Level 106     MMOL/L


 


Carbon Dioxide Level 19 L  21-32  MMOL/L


 


Anion Gap 13   5-14  MMOL/L


 


Blood Urea Nitrogen 11   7-18  MG/DL


 


Creatinine


  1.09 


  


  0.60-1.30


MG/DL


 


Estimat Glomerular Filtration


Rate 50 


  


   


 


 


BUN/Creatinine Ratio 10    


 


Glucose Level 220 H    MG/DL


 


Calcium Level 8.7   8.5-10.1  MG/DL


 


Magnesium Level 1.7 L  1.8-2.4  MG/DL


 


Total Bilirubin 0.4   0.1-1.0  MG/DL


 


Aspartate Amino Transf


(AST/SGOT) 27 


  


  5-34  U/L


 


 


Alanine Aminotransferase


(ALT/SGPT) 16 


  


  0-55  U/L


 


 


Alkaline Phosphatase 62     U/L


 


Ammonia 25   11-32  UMOL/L


 


Troponin I < 0.30   <0.30  NG/ML


 


C-Reactive Protein High


Sensitivity 0.90 H


  


  0.00-0.50


MG/DL


 


Total Protein 6.9   6.4-8.2  GM/DL


 


Albumin 3.1 L  3.2-4.5  GM/DL


 


Urine Color  ESTEFANY H  


 


Urine Clarity  CLEAR   


 


Urine pH  5  5-9  


 


Urine Specific Gravity  1.015 L 1.016-1.022  


 


Urine Protein  2+ H NEGATIVE  


 


Urine Glucose (UA)  NEGATIVE  NEGATIVE  


 


Urine Ketones  NEGATIVE  NEGATIVE  


 


Urine Nitrite  NEGATIVE  NEGATIVE  


 


Urine Bilirubin  NEGATIVE  NEGATIVE  


 


Urine Urobilinogen  NORMAL  NORMAL  MG/DL


 


Urine Leukocyte Esterase  2+ H NEGATIVE  


 


Urine RBC (Auto)  NEGATIVE  NEGATIVE  


 


Urine RBC  NONE   /HPF


 


Urine WBC  10-25 H  /HPF


 


Urine Squamous Epithelial


Cells 


  2-5 


   /HPF


 


 


Urine Crystals  NONE   /LPF


 


Urine Bacteria  NEGATIVE   /HPF


 


Urine Casts  PRESENT   /LPF


 


Urine Hyaline Casts  2-5 H  /LPF


 


Urine Mucus  NEGATIVE   /LPF


 


Urine Yeast  MODERATE H  /HPF


 


Urine Culture Indicated  YES   








My Orders





Orders - [facility]


Ct Head/Cervical Spine Wo (17 21:27)


Saline Lock/Iv-Start (17 21:27)


Ammonia (17 21:27)


Cbc With Automated Diff (17 21:27)


Comprehensive Metabolic Panel (17 21:27)


Hs C Reactive Protein (17 21:27)


Magnesium (17 21:27)


Troponin I (17 21:27)


Ua Culture If Indicated (17 21:27)


Chest 1 View, Ap/Pa Only (17 21:27)


Clavicle, Left (17 21:27)


Hip, Right, 2 Views (17 21:27)


Fentanyl  Injection (Sublimaze Injection (17 21:27)


Ns Iv 500 Ml (Sodium Chloride 0.9%) (17 21:27)


Ondansetron Injection (Zofran Injectio (17 21:30)


Knee, 3 Views, Bilateral (17 21:27)


Magnesium 1 Gm/100 Ml Ivpb (Magnesium Erazo (17 22:45)


Potassium Cl 10meq/50ml Ivpb (Kcl 10 Meq (17 22:45)


Urine Culture (17 23:03)


Ceftriaxone Injection (Rocephin Injectio (17 00:30)


Fentanyl  Injection (Sublimaze Injection (17 00:30)


Ns 1l Iv (17 00:45)


Lactic Acid Analyzer (17 00:33)





Medications Given in ED





Current Medications








 Medications  Dose


 Ordered  Sig/Corona


 Route  Start Time


 Stop Time Status Last Admin


Dose Admin


 


 Magnesium Sulfate/


 Dextrose  100 ml @ 


 100 mls/hr  ONCE  ONCE


 IV  17 22:45


 17 23:44 DC 17 22:48


100 MLS/HR


 


 Ondansetron HCl  4 mg  ONCE  ONCE


 IVP  17 21:30


 17 21:32 DC 17 21:45


4 MG


 


 Sodium Chloride  500 ml @ 0


 mls/hr  Q0M ONCE


 IV  17 21:27


 17 21:32 DC 17 21:45


0 MLS/HR








Vital Signs/I&O





Vital Sign - Last 12Hours








 17





 21:20 21:45 00:00


 


Temp 99.6 99.6 98.2


 


Pulse 110  79


 


Resp 18  18


 


B/P (MAP) 167/75  124/41


 


Pulse Ox 96  95


 


O2 Delivery Room Air  Room Air














Intake and Output 


 


 17





 00:00


 


Intake Total 650 ml


 


Balance 650 ml








Progress Note :  


   Time:  00:28


Progress Note


Patient has a UTI and a fall with no fractures. She is feeling better. Given 

her some fluids and started her on Rocephin while her pain is better controlled 

she is still quite dry and would probably be well served for an observation 

stay to help manage her pain get some fluids back in her and treat her 

infection.





Diagnostic Imaging





   Diagonstic Imaging:  Xray


   Plain Films/CT/US/NM/MRI:  chest


Comments


No acute cardio pulmonary processes noted.


   Reviewed:  Reviewed by Me








   Diagonstic Imaging:  Xray


   Plain Films/CT/US/NM/MRI:  knee (bilateral)


Comments


No acute osseous abnormalities noted.


   Reviewed:  Reviewed by Me








   Diagonstic Imaging:  Xray


   Plain Films/CT/US/NM/MRI:  hip (right)


Comments


No acute osseous abnormality is noted


   Reviewed:  Reviewed by Me








   Diagonstic Imaging:  Xray


   Plain Films/CT/US/NM/MRI:  other (left clavicle)


Comments


No acute osseous abnormalities noted. Degenerative changes noted throughout.


   Reviewed:  Reviewed by Me








   Diagonstic Imaging:  CT


   Plain Films/CT/US/NM/MRI:  c-spine, head


Comments


No acute osseous abnormality is noted; no mass, midline shift, or intracranial 

bleed noted. No skull fracture.





No acute intracranial hemorrhage or cortical ischemia. No skull fracture. 

Chronic ischemic changes cannot exclude acute on chronic ischemia. No acute 

fracture or subluxation of the C-spine


   Reviewed:  Reviewed [place] Study, Reviewed by Me





Departure


Communication


Time/Spoke to Admitting Phy:  00:34


Communication


Nadya; discussed the case and possibility of sepsis secondary to UTI as 

well as her period being very dry and unsafe to go home as she is received 

narcotics in her to have fallen this very slow mentating. Discussed the stat 

read read of possible inability to exclude acute on chronic ischemia. He is 

okay to keep the patient overnight he'll see her in the morning and would like 

a CBC and CMP in the morning.





Impression


Impression:  


 Primary Impression:  


 Fall from standing


 Qualified Codes:  W19.XXXA - Unspecified fall, initial encounter


 Additional Impressions:  


 Sepsis


 Qualified Codes:  A41.9 - Sepsis, unspecified organism


 UTI (urinary tract infection)


 Qualified Codes:  N30.00 - Acute cystitis without hematuria


Disposition:   ADMITTED AS INPATIENT (obs)


Condition:  Stable


Decision to Admit/Date:  2017


Time/Decision to Admit Time:  00:39





Departure-Patient Inst.


Referrals:  


RODY MOSER DO (PCP/Family)


Primary Care Physician





Copy


Copies To 1:   RODY MOSER TITUS J 2017 21:27

## 2017-07-25 VITALS — SYSTOLIC BLOOD PRESSURE: 180 MMHG | DIASTOLIC BLOOD PRESSURE: 79 MMHG

## 2017-07-25 VITALS — DIASTOLIC BLOOD PRESSURE: 62 MMHG | SYSTOLIC BLOOD PRESSURE: 132 MMHG

## 2017-07-25 VITALS — DIASTOLIC BLOOD PRESSURE: 74 MMHG | SYSTOLIC BLOOD PRESSURE: 169 MMHG

## 2017-07-25 VITALS — DIASTOLIC BLOOD PRESSURE: 69 MMHG | SYSTOLIC BLOOD PRESSURE: 157 MMHG

## 2017-07-25 LAB
ALBUMIN SERPL-MCNC: 2.9 GM/DL (ref 3.2–4.5)
ALT SERPL-CCNC: 14 U/L (ref 0–55)
ANION GAP SERPL CALC-SCNC: 14 MMOL/L (ref 5–14)
AST SERPL-CCNC: 22 U/L (ref 5–34)
BASOPHILS # BLD AUTO: 0 10^3/UL (ref 0–0.1)
BASOPHILS NFR BLD AUTO: 0 % (ref 0–10)
BILIRUB SERPL-MCNC: 0.3 MG/DL (ref 0.1–1)
BUN SERPL-MCNC: 9 MG/DL (ref 7–18)
BUN/CREAT SERPL: 10
CALCIUM SERPL-MCNC: 8.4 MG/DL (ref 8.5–10.1)
CHLORIDE SERPL-SCNC: 110 MMOL/L (ref 98–107)
CO2 SERPL-SCNC: 16 MMOL/L (ref 21–32)
CREAT SERPL-MCNC: 0.88 MG/DL (ref 0.6–1.3)
EOSINOPHIL # BLD AUTO: 0.2 10^3/UL (ref 0–0.3)
EOSINOPHIL NFR BLD AUTO: 2 % (ref 0–10)
ERYTHROCYTE [DISTWIDTH] IN BLOOD BY AUTOMATED COUNT: 18.6 % (ref 10–14.5)
GFR SERPLBLD BASED ON 1.73 SQ M-ARVRAT: > 60 ML/MIN
GLUCOSE SERPL-MCNC: 156 MG/DL (ref 70–105)
LYMPHOCYTES # BLD AUTO: 1.6 X 10^3 (ref 1–4)
LYMPHOCYTES NFR BLD AUTO: 18 % (ref 12–44)
MCH RBC QN AUTO: 27 PG (ref 25–34)
MCHC RBC AUTO-ENTMCNC: 31 G/DL (ref 32–36)
MCV RBC AUTO: 86 FL (ref 80–99)
MONOCYTES # BLD AUTO: 1.1 X 10^3 (ref 0–1)
MONOCYTES NFR BLD AUTO: 13 % (ref 0–12)
NEUTROPHILS # BLD AUTO: 5.8 X 10^3 (ref 1.8–7.8)
NEUTROPHILS NFR BLD AUTO: 67 % (ref 42–75)
PLATELET # BLD: 479 10^3/UL (ref 130–400)
PMV BLD AUTO: 11 FL (ref 7.4–10.4)
POTASSIUM SERPL-SCNC: 3.4 MMOL/L (ref 3.6–5)
PROT SERPL-MCNC: 6.4 GM/DL (ref 6.4–8.2)
RBC # BLD AUTO: 3.86 10^6/UL (ref 4.35–5.85)
SODIUM SERPL-SCNC: 140 MMOL/L (ref 135–145)
WBC # BLD AUTO: 8.7 10^3/UL (ref 4.3–11)

## 2017-07-25 RX ADMIN — FAMOTIDINE SCH MG: 20 TABLET, FILM COATED ORAL at 21:43

## 2017-07-25 RX ADMIN — Medication SCH ML: at 07:49

## 2017-07-25 RX ADMIN — NYSTATIN SCH GM: 100000 CREAM TOPICAL at 21:36

## 2017-07-25 RX ADMIN — ATORVASTATIN CALCIUM SCH MG: 40 TABLET, FILM COATED ORAL at 21:35

## 2017-07-25 RX ADMIN — FAMOTIDINE SCH MG: 20 TABLET, FILM COATED ORAL at 21:35

## 2017-07-25 RX ADMIN — ATORVASTATIN CALCIUM SCH MG: 40 TABLET, FILM COATED ORAL at 21:43

## 2017-07-25 RX ADMIN — POTASSIUM CHLORIDE SCH MEQ: 750 TABLET, FILM COATED, EXTENDED RELEASE ORAL at 21:35

## 2017-07-25 RX ADMIN — POTASSIUM CHLORIDE SCH MEQ: 750 TABLET, FILM COATED, EXTENDED RELEASE ORAL at 21:43

## 2017-07-25 RX ADMIN — Medication SCH MG: at 21:35

## 2017-07-25 RX ADMIN — ANORECTAL OINTMENT SCH GM: 15.7; .44; 24; 20.6 OINTMENT TOPICAL at 21:35

## 2017-07-25 RX ADMIN — Medication SCH ML: at 14:26

## 2017-07-25 RX ADMIN — SODIUM CHLORIDE SCH MLS/HR: 900 INJECTION, SOLUTION INTRAVENOUS at 19:00

## 2017-07-25 RX ADMIN — SODIUM CHLORIDE SCH MLS/HR: 900 INJECTION, SOLUTION INTRAVENOUS at 03:00

## 2017-07-25 RX ADMIN — Medication SCH ML: at 21:36

## 2017-07-25 RX ADMIN — Medication SCH MG: at 21:43

## 2017-07-25 RX ADMIN — SODIUM CHLORIDE SCH MLS/HR: 900 INJECTION, SOLUTION INTRAVENOUS at 14:27

## 2017-07-25 RX ADMIN — NYSTATIN SCH GM: 100000 CREAM TOPICAL at 15:18

## 2017-07-25 NOTE — DIAGNOSTIC IMAGING REPORT
PROCEDURE: 

CT head and CT cervical spine without contrast.



TECHNIQUE: 

Multiple contiguous axial images were obtained through the brain

and cervical spine without the use of intravenous contrast.

Sagittal and coronal reformations through the cervical spine were

then performed.



INDICATION:  

Fall.



FINDINGS:



CT HEAD:

There is no intracranial hemorrhage, edema, or mass effect. The

brain parenchyma demonstrates periventricular and deep white

hypodensities, compatible with chronic microvascular ischemic

changes. There is no hydrocephalus. No extra-axial fluid

collection is seen.



The calvarium, paranasal sinuses, and orbits appear grossly

unremarkable.



CT CERVICAL SPINE:

There is satisfactory alignment of the cervical spine at the

posterior spinal line and at the facet joints. There is no

widening of the predental space. The alignment of the lateral

masses of C1 and C2 and the atlantooccipital joints appears

unremarkable.



Degenerative changes of the facet joints are seen. There are also

anterior and minimal posterior osteophytes noted. No acute

fracture is identified.



IMPRESSION: 



CT HEAD: 

No intracranial hemorrhage. White matter findings are suggestive

of chronic microvascular ischemic changes.



CT CERVICAL SPINE: 

Degenerative changes. No fracture is seen.



 



Dictated by: 



  Dictated on workstation # [id number]
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
## 2017-07-25 NOTE — DIAGNOSTIC IMAGING REPORT
PROCEDURE: MR imaging of the brain with and without contrast.



TECHNIQUE: Multiplanar, multisequence MR imaging of the brain was

performed with and without contrast.



INDICATION: Altered mental status.



TECHNIQUE: 7 mL of Gadavist was administered intravenously.



COMPARISON: MRI of 12/9/2012.



FINDINGS:  

Please note that there is some motion artifact on multiple

sequences which could obscure particularly subtle abnormalities.

Evaluation for major abnormalities is reasonable.



There is an enhancing 2.8 x 1.5 x 2.7 cm mass in the extra-axial

space anteriorly along the right frontal region. This has avid

enhancement and homogeneous low intensity on T1 and mild

hyperintensity on T2 weighted images with a dural tail compatible

with meningioma. It is mildly enlarged compared to previous

measurements of 1.9 x 1.3 x 1.8 cm on MRI of 12/9/2012.



The adjacent right frontal lobe anteriorly demonstrates mild mass

effect from this mass and there is also suggestion of mild T2 and

FLAIR signal abnormality. There is prominent periventricular,

deep and subcortical white matter T2 hyperintense signal

suggestive of chronic microvascular ischemic changes. There is no

diffusion restriction to suggest an acute infarct or other

diffusion abnormality.



No enhancing intra-axial mass. The pituitary gland is normal in

size. No hypothalamic or pineal region mass.



The CSF spaces appear slightly expanded which appears to relate

to mild age related atrophy and there is perhaps mild

superimposed component of a communicating hydrocephalus. There is

a chronic lacunar infarct in the brainstem which appears to be

present in 2012.



The central vascular flow-voids appear grossly unremarkable. The

internal auditory canals and inner ear structures appear

unremarkable. There is mild mucosal thickening in the ethmoid air

cells.



IMPRESSION:

1. There is a 2.8 cm extra-axial mass along the right frontal

region suggestive of meningioma, demonstrating mild enlargement

compared to 2012 exam. It has mild localized mass effect along

the adjacent aspect of the right frontal lobe.

2. Chronic ischemic changes with no evidence of acute infarct.

3. Mild dilatation of ventricles out of proportion to the rest of

the CSF spaces, could correlate with mild normal pressure

hydrocephalus.



Dictated by: 



  Dictated on workstation # [id number]

## 2017-07-25 NOTE — DIAGNOSTIC IMAGING REPORT
INDICATION: 

Fall. Shoulder pain.



FINDINGS: 

The portable AP chest shows the lungs to be well-aerated. No

pneumothorax or pleural effusion. No rib fracture is

demonstrated. The clavicles appear intact. The glenohumeral

joints are in good alignment.



IMPRESSION: 

Normal portable chest.



Dictated by: 



  Dictated on workstation # [id number]

## 2017-07-25 NOTE — DIAGNOSTIC IMAGING REPORT
EXAMINATION:

Three views of the right knee and three views of the left knee.



INDICATION: 

Fall.



FINDINGS:



RIGHT KNEE:

There is a 7 mm calcific density projecting over the mid aspect

of the right knee joint. This is also along vascular

calcifications seen. The medial joint space is mildly narrowed.

There are osteophytes seen. There is no joint effusion.



LEFT KNEE: 

No fracture, dislocation, or radiopaque foreign body. There is

moderate joint space narrowing in the medial compartment and

osteophyte formation. No suprapatellar effusion.



IMPRESSION: 

There is a 7 mm calcific density seen projecting over the mid

knee joint area. This is favored to be an overlying popliteal

artery calcification rather than an avulsion fracture from the

tibial spine.



Dictated by: 



  Dictated on workstation # [id number]

## 2017-07-25 NOTE — DIAGNOSTIC IMAGING REPORT
INDICATION: 

Fall.



FINDINGS: 

The right hip shows normal articulation. There are no fractures

present. Moderate degenerative changes are noted. There is some

calcification over the greater trochanter suggesting some

calcific bursitis. There is dense vascular calcification noted in

the femoral artery.



IMPRESSION:

1. No acute abnormality.

2. Degenerative changes of the hip as described.



Dictated by: 



  Dictated on workstation # [id number]

## 2017-07-25 NOTE — DIAGNOSTIC IMAGING REPORT
INDICATION:

Fell. Left shoulder pain.



FINDINGS: 

The left clavicle shows good alignment with the AC joint. There

is marked degenerative hypertrophic disease. There are no

fractures. The glenohumeral joint is in good alignment.



IMPRESSION: 

Degenerative changes of the AC joint with no acute abnormalities.



Dictated by: 



  Dictated on workstation # [id number]

## 2017-07-26 VITALS — SYSTOLIC BLOOD PRESSURE: 170 MMHG | DIASTOLIC BLOOD PRESSURE: 74 MMHG

## 2017-07-26 VITALS — DIASTOLIC BLOOD PRESSURE: 63 MMHG | SYSTOLIC BLOOD PRESSURE: 143 MMHG

## 2017-07-26 VITALS — DIASTOLIC BLOOD PRESSURE: 68 MMHG | SYSTOLIC BLOOD PRESSURE: 158 MMHG

## 2017-07-26 VITALS — DIASTOLIC BLOOD PRESSURE: 65 MMHG | SYSTOLIC BLOOD PRESSURE: 142 MMHG

## 2017-07-26 VITALS — SYSTOLIC BLOOD PRESSURE: 185 MMHG | DIASTOLIC BLOOD PRESSURE: 73 MMHG

## 2017-07-26 VITALS — SYSTOLIC BLOOD PRESSURE: 123 MMHG | DIASTOLIC BLOOD PRESSURE: 59 MMHG

## 2017-07-26 LAB
ANION GAP SERPL CALC-SCNC: 8 MMOL/L (ref 5–14)
BASOPHILS # BLD AUTO: 0 10^3/UL (ref 0–0.1)
BASOPHILS NFR BLD AUTO: 0 % (ref 0–10)
BUN SERPL-MCNC: 6 MG/DL (ref 7–18)
BUN/CREAT SERPL: 8
CALCIUM SERPL-MCNC: 8.4 MG/DL (ref 8.5–10.1)
CHLORIDE SERPL-SCNC: 111 MMOL/L (ref 98–107)
CO2 SERPL-SCNC: 22 MMOL/L (ref 21–32)
CREAT SERPL-MCNC: 0.72 MG/DL (ref 0.6–1.3)
EOSINOPHIL # BLD AUTO: 0.3 10^3/UL (ref 0–0.3)
EOSINOPHIL NFR BLD AUTO: 4 % (ref 0–10)
ERYTHROCYTE [DISTWIDTH] IN BLOOD BY AUTOMATED COUNT: 18.5 % (ref 10–14.5)
GFR SERPLBLD BASED ON 1.73 SQ M-ARVRAT: > 60 ML/MIN
GLUCOSE SERPL-MCNC: 133 MG/DL (ref 70–105)
LYMPHOCYTES # BLD AUTO: 1.6 X 10^3 (ref 1–4)
LYMPHOCYTES NFR BLD AUTO: 23 % (ref 12–44)
MAGNESIUM SERPL-MCNC: 1.4 MG/DL (ref 1.8–2.4)
MCH RBC QN AUTO: 26 PG (ref 25–34)
MCHC RBC AUTO-ENTMCNC: 31 G/DL (ref 32–36)
MCV RBC AUTO: 87 FL (ref 80–99)
MONOCYTES # BLD AUTO: 0.8 X 10^3 (ref 0–1)
MONOCYTES NFR BLD AUTO: 11 % (ref 0–12)
NEUTROPHILS # BLD AUTO: 4.5 X 10^3 (ref 1.8–7.8)
NEUTROPHILS NFR BLD AUTO: 63 % (ref 42–75)
PLATELET # BLD: 486 10^3/UL (ref 130–400)
PMV BLD AUTO: 10.6 FL (ref 7.4–10.4)
POTASSIUM SERPL-SCNC: 3.3 MMOL/L (ref 3.6–5)
RBC # BLD AUTO: 3.33 10^6/UL (ref 4.35–5.85)
SODIUM SERPL-SCNC: 141 MMOL/L (ref 135–145)
WBC # BLD AUTO: 7.1 10^3/UL (ref 4.3–11)

## 2017-07-26 RX ADMIN — ANORECTAL OINTMENT SCH GM: 15.7; .44; 24; 20.6 OINTMENT TOPICAL at 10:19

## 2017-07-26 RX ADMIN — COMPOUNDING SYRUP VEHICLE SCH ML: 1 SYRUP at 13:54

## 2017-07-26 RX ADMIN — SODIUM CHLORIDE SCH MLS/HR: 900 INJECTION, SOLUTION INTRAVENOUS at 13:24

## 2017-07-26 RX ADMIN — COMPOUNDING SYRUP VEHICLE SCH ML: 1 SYRUP at 19:35

## 2017-07-26 RX ADMIN — FERROUS SULFATE TAB 325 MG (65 MG ELEMENTAL FE) SCH MG: 325 (65 FE) TAB at 10:16

## 2017-07-26 RX ADMIN — Medication SCH ML: at 13:26

## 2017-07-26 RX ADMIN — ATORVASTATIN CALCIUM SCH MG: 40 TABLET, FILM COATED ORAL at 20:48

## 2017-07-26 RX ADMIN — NYSTATIN SCH GM: 100000 CREAM TOPICAL at 20:49

## 2017-07-26 RX ADMIN — ACETAMINOPHEN PRN MG: 500 TABLET ORAL at 10:16

## 2017-07-26 RX ADMIN — ANORECTAL OINTMENT SCH GM: 15.7; .44; 24; 20.6 OINTMENT TOPICAL at 20:48

## 2017-07-26 RX ADMIN — Medication SCH ML: at 06:14

## 2017-07-26 RX ADMIN — AMLODIPINE BESYLATE SCH MG: 5 TABLET ORAL at 10:17

## 2017-07-26 RX ADMIN — NYSTATIN SCH GM: 100000 CREAM TOPICAL at 10:18

## 2017-07-26 RX ADMIN — Medication SCH MG: at 20:48

## 2017-07-26 RX ADMIN — POTASSIUM CHLORIDE SCH MEQ: 750 TABLET, FILM COATED, EXTENDED RELEASE ORAL at 20:48

## 2017-07-26 RX ADMIN — SODIUM CHLORIDE SCH MLS/HR: 900 INJECTION, SOLUTION INTRAVENOUS at 18:51

## 2017-07-26 RX ADMIN — CLOPIDOGREL BISULFATE SCH MG: 75 TABLET, FILM COATED ORAL at 10:15

## 2017-07-26 RX ADMIN — NYSTATIN SCH GM: 100000 CREAM TOPICAL at 13:24

## 2017-07-26 RX ADMIN — FAMOTIDINE SCH MG: 20 TABLET, FILM COATED ORAL at 20:48

## 2017-07-26 RX ADMIN — POTASSIUM CHLORIDE SCH MEQ: 750 TABLET, FILM COATED, EXTENDED RELEASE ORAL at 10:17

## 2017-07-26 RX ADMIN — Medication SCH EA: at 06:14

## 2017-07-26 RX ADMIN — Medication SCH MG: at 10:17

## 2017-07-26 RX ADMIN — Medication SCH ML: at 20:49

## 2017-07-26 RX ADMIN — FAMOTIDINE SCH MG: 20 TABLET, FILM COATED ORAL at 10:17

## 2017-07-26 NOTE — PROGRESS NOTE (SOAP)
Subjective


Time Seen by Provider:  08:20


Subjective/Events-last exam


altered mental status.


UTI.


fall 


Patient more alert today.


Hypertension but not taking her medicine refusing it.


Patient looks better today.


C. difficile


Urine culture negative





Objective


Exam





Vital Signs








  Date Time  Temp Pulse Resp B/P (MAP) Pulse Ox O2 Delivery O2 Flow Rate FiO2


 


17 07:30 97.3 86 20 185/73 99 Room Air  


 


17 04:00 97.6 87 18 170/74 95 Room Air  


 


17 00:42 98.2 85 18 158/68 93 Room Air  


 


17 19:34 98.0 75 20 132/62 96 Room Air  


 


17 15:47 98.4 89 18 157/69 99 Room Air  


 


17 12:45 98.9 89 20 169/74 97   














I & O 


 


 17





 07:00


 


Intake Total 2375 ml


 


Balance 2375 ml





Capillary Refill : Less Than 3 Seconds


General Appearance:  No Apparent Distress


HEENT:  Normal ENT Inspection


Neck:  Full Range of Motion, Normal Inspection


Respiratory:  Chest Non Tender, Lungs Clear, No Accessory Muscle Use, No 

Respiratory Distress


Cardiovascular:  Regular Rate, Rhythm, No Murmur


Gastrointestinal:  non tender, soft





Results


Lab


Laboratory Tests


17 07:52








Laboratory Tests


17 07:52: 


White Blood Count 7.1, Red Blood Count 3.33L, Hemoglobin 8.8L, Hematocrit 29L, 

Mean Corpuscular Volume 87, Mean Corpuscular Hemoglobin 26, Mean Corpuscular 

Hemoglobin Concent 31L, Red Cell Distribution Width 18.5H, Platelet Count 486H, 

Mean Platelet Volume 10.6H, Neutrophils (%) (Auto) 63, Lymphocytes (%) (Auto) 23

, Monocytes (%) (Auto) 11, Eosinophils (%) (Auto) 4, Basophils (%) (Auto) 0, 

Neutrophils # (Auto) 4.5, Lymphocytes # (Auto) 1.6, Monocytes # (Auto) 0.8, 

Eosinophils # (Auto) 0.3, Basophils # (Auto) 0.0, Sodium Level 141, Potassium 

Level 3.3L, Chloride Level 111H, Carbon Dioxide Level 22, Anion Gap 8, Blood 

Urea Nitrogen 6L, Creatinine 0.72, Estimat Glomerular Filtration Rate > 60, BUN/

Creatinine Ratio 8, Glucose Level 133H, Calcium Level 8.4L, Magnesium Level 1.4L





Microbiology


17 Urine Culture - Preliminary, Resulted


          NO GROWTH





Assessment/Plan


Assessment/Plan


Assess & Plan/Chief Complaint


fall.


Altered mental status resolving .


C. difficile.


Urine culture negative.


Hypertension refusing medications.


Patient looks better today.


Meningioma on MRI slightly enlarged from 2012





Clinical Quality Measures


DVT/VTE Risk/Contraindication:


Risk Factor Score Per Nursin


RFS Level Per Nursing on Admit:  4+=Very High











RODY MOSER DO 2017 08:27

## 2017-07-27 VITALS — SYSTOLIC BLOOD PRESSURE: 121 MMHG | DIASTOLIC BLOOD PRESSURE: 60 MMHG

## 2017-07-27 VITALS — SYSTOLIC BLOOD PRESSURE: 177 MMHG | DIASTOLIC BLOOD PRESSURE: 76 MMHG

## 2017-07-27 VITALS — DIASTOLIC BLOOD PRESSURE: 76 MMHG | SYSTOLIC BLOOD PRESSURE: 177 MMHG

## 2017-07-27 VITALS — SYSTOLIC BLOOD PRESSURE: 145 MMHG | DIASTOLIC BLOOD PRESSURE: 67 MMHG

## 2017-07-27 LAB
ALBUMIN SERPL-MCNC: 2.6 GM/DL (ref 3.2–4.5)
ALT SERPL-CCNC: 14 U/L (ref 0–55)
ANION GAP SERPL CALC-SCNC: 11 MMOL/L (ref 5–14)
AST SERPL-CCNC: 16 U/L (ref 5–34)
BILIRUB SERPL-MCNC: 0.3 MG/DL (ref 0.1–1)
BUN SERPL-MCNC: 5 MG/DL (ref 7–18)
BUN/CREAT SERPL: 8
CALCIUM SERPL-MCNC: 8.3 MG/DL (ref 8.5–10.1)
CHLORIDE SERPL-SCNC: 111 MMOL/L (ref 98–107)
CO2 SERPL-SCNC: 19 MMOL/L (ref 21–32)
CREAT SERPL-MCNC: 0.66 MG/DL (ref 0.6–1.3)
ERYTHROCYTE [DISTWIDTH] IN BLOOD BY AUTOMATED COUNT: 18.1 % (ref 10–14.5)
GFR SERPLBLD BASED ON 1.73 SQ M-ARVRAT: > 60 ML/MIN
GLUCOSE SERPL-MCNC: 106 MG/DL (ref 70–105)
MCH RBC QN AUTO: 27 PG (ref 25–34)
MCHC RBC AUTO-ENTMCNC: 31 G/DL (ref 32–36)
MCV RBC AUTO: 86 FL (ref 80–99)
PLATELET # BLD: 539 10^3/UL (ref 130–400)
PMV BLD AUTO: 10.3 FL (ref 7.4–10.4)
POTASSIUM SERPL-SCNC: 3.3 MMOL/L (ref 3.6–5)
PROT SERPL-MCNC: 5.9 GM/DL (ref 6.4–8.2)
RBC # BLD AUTO: 3.59 10^6/UL (ref 4.35–5.85)
SODIUM SERPL-SCNC: 141 MMOL/L (ref 135–145)
WBC # BLD AUTO: 5.1 10^3/UL (ref 4.3–11)

## 2017-07-27 RX ADMIN — POTASSIUM CHLORIDE SCH MEQ: 750 TABLET, FILM COATED, EXTENDED RELEASE ORAL at 09:04

## 2017-07-27 RX ADMIN — COMPOUNDING SYRUP VEHICLE SCH ML: 1 SYRUP at 07:00

## 2017-07-27 RX ADMIN — Medication SCH MG: at 09:04

## 2017-07-27 RX ADMIN — COMPOUNDING SYRUP VEHICLE SCH ML: 1 SYRUP at 07:34

## 2017-07-27 RX ADMIN — SODIUM CHLORIDE SCH MLS/HR: 900 INJECTION, SOLUTION INTRAVENOUS at 08:46

## 2017-07-27 RX ADMIN — FERROUS SULFATE TAB 325 MG (65 MG ELEMENTAL FE) SCH MG: 325 (65 FE) TAB at 09:04

## 2017-07-27 RX ADMIN — FAMOTIDINE SCH MG: 20 TABLET, FILM COATED ORAL at 09:03

## 2017-07-27 RX ADMIN — COMPOUNDING SYRUP VEHICLE SCH ML: 1 SYRUP at 11:33

## 2017-07-27 RX ADMIN — NYSTATIN SCH GM: 100000 CREAM TOPICAL at 09:05

## 2017-07-27 RX ADMIN — ACETAMINOPHEN PRN MG: 500 TABLET ORAL at 09:04

## 2017-07-27 RX ADMIN — Medication SCH EA: at 07:33

## 2017-07-27 RX ADMIN — ANORECTAL OINTMENT SCH GM: 15.7; .44; 24; 20.6 OINTMENT TOPICAL at 09:05

## 2017-07-27 RX ADMIN — NYSTATIN SCH GM: 100000 CREAM TOPICAL at 11:33

## 2017-07-27 RX ADMIN — Medication SCH EA: at 07:00

## 2017-07-27 RX ADMIN — Medication SCH ML: at 07:33

## 2017-07-27 RX ADMIN — COMPOUNDING SYRUP VEHICLE SCH ML: 1 SYRUP at 01:05

## 2017-07-27 RX ADMIN — CLOPIDOGREL BISULFATE SCH MG: 75 TABLET, FILM COATED ORAL at 09:04

## 2017-07-27 RX ADMIN — AMLODIPINE BESYLATE SCH MG: 5 TABLET ORAL at 09:03

## 2017-07-27 NOTE — PROGRESS NOTE (SOAP)
Subjective


Time Seen by Provider:  07:20


Subjective/Events-last exam


patient doing better today.


Patient still confused.  Patient does not know the President of [place] 

[place].  Patient does not know the year or day.  Patient does not know what 10-

2 equals.


MRI of the head shows meningioma.


Fall.


Patient put on vancomycin for C. difficile.


Diabetes.


Altered mental status.


Patient needs to be seen by neurosurgeon.


Patient stable.


Plan to discharge patient today





Objective


Exam





Vital Signs








  Date Time  Temp Pulse Resp B/P (MAP) Pulse Ox O2 Delivery O2 Flow Rate FiO2


 


17 04:00 97.7 82 18 145/67 99 Room Air  


 


17 00:05 97.5       


 


17 00:00 96.5 79 20 121/60 96 Room Air  


 


17 20:00 98.0 81 18 142/65 97 Room Air  


 


17 16:00 98.4 87 18 123/59 97 Room Air  


 


17 12:00 97.0 85 18 143/63 95 Room Air  


 


17 07:30 97.3 86 20 185/73 99 Room Air  














I & O 


 


 17





 07:00


 


Intake Total 560 ml


 


Balance 560 ml





Capillary Refill : Less Than 3 Seconds


General Appearance:  No Apparent Distress, WD/WN


HEENT:  Normal ENT Inspection


Neck:  Full Range of Motion, Normal Inspection


Respiratory:  Chest Non Tender, Lungs Clear, Normal Breath Sounds, No Accessory 

Muscle Use, No Respiratory Distress


Cardiovascular:  Regular Rate, Rhythm, No Murmur


Gastrointestinal:  non tender, soft





Results


Lab


Laboratory Tests


17 07:52








Laboratory Tests


17 07:52: 


White Blood Count 7.1, Red Blood Count 3.33L, Hemoglobin 8.8L, Hematocrit 29L, 

Mean Corpuscular Volume 87, Mean Corpuscular Hemoglobin 26, Mean Corpuscular 

Hemoglobin Concent 31L, Red Cell Distribution Width 18.5H, Platelet Count 486H, 

Mean Platelet Volume 10.6H, Neutrophils (%) (Auto) 63, Lymphocytes (%) (Auto) 23

, Monocytes (%) (Auto) 11, Eosinophils (%) (Auto) 4, Basophils (%) (Auto) 0, 

Neutrophils # (Auto) 4.5, Lymphocytes # (Auto) 1.6, Monocytes # (Auto) 0.8, 

Eosinophils # (Auto) 0.3, Basophils # (Auto) 0.0, Sodium Level 141, Potassium 

Level 3.3L, Chloride Level 111H, Carbon Dioxide Level 22, Anion Gap 8, Blood 

Urea Nitrogen 6L, Creatinine 0.72, Estimat Glomerular Filtration Rate > 60, BUN/

Creatinine Ratio 8, Glucose Level 133H, Calcium Level 8.4L, Magnesium Level 1.4L





Microbiology


17 C. difficile [facility] Antigen & Toxins - Final, Complete


          


17 Urine Culture - Final, Complete





Assessment/Plan


Assessment/Plan


Assess & Plan/Chief Complaint


fall.


Altered mental status resolving .


C. difficile.


Urine culture negative.


Hypertension refusing medications.


Patient looks better today.


Meningioma on MRI slightly enlarged from .


.


17.


4.


MENTAL status.


Meningioma and brain.


C. difficile.


Urine culture negative.


Hypertension.


Patient stable.


Altered mental status.


Plan to discharge today.


Plan to have patient be seen by neurosurgeon





Clinical Quality Measures


DVT/VTE Risk/Contraindication:


Risk Factor Score Per Nursin


RFS Level Per Nursing on Admit:  4+=Very High











RODY MOSER DO 2017 07:25

## 2017-07-28 NOTE — PHYSICIAN QUERY-FINAL DX
Final Diagnosis


Give Final Diagnosis


Please give Final Diagnosis











NATE CROWELL Jul 28, 2017 15:35

## 2017-09-26 ENCOUNTER — HOSPITAL ENCOUNTER (OUTPATIENT)
Dept: HOSPITAL 75 - WOUNDCARE | Age: 67
End: 2017-09-26
Attending: NURSE PRACTITIONER
Payer: MEDICARE

## 2017-09-26 DIAGNOSIS — L89.619: ICD-10-CM

## 2017-09-26 DIAGNOSIS — L89.153: ICD-10-CM

## 2017-09-26 DIAGNOSIS — L89.629: ICD-10-CM

## 2017-09-26 DIAGNOSIS — E11.621: Primary | ICD-10-CM

## 2017-09-26 PROCEDURE — 11045 DBRDMT SUBQ TISS EACH ADDL: CPT

## 2017-09-26 PROCEDURE — 11042 DBRDMT SUBQ TIS 1ST 20SQCM/<: CPT

## 2017-10-03 ENCOUNTER — HOSPITAL ENCOUNTER (OUTPATIENT)
Dept: HOSPITAL 75 - WOUNDCARE | Age: 67
End: 2017-10-03
Attending: NURSE PRACTITIONER
Payer: MEDICARE

## 2017-10-03 DIAGNOSIS — L89.629: ICD-10-CM

## 2017-10-03 DIAGNOSIS — E11.621: Primary | ICD-10-CM

## 2017-10-03 DIAGNOSIS — L89.153: ICD-10-CM

## 2017-10-03 DIAGNOSIS — L89.619: ICD-10-CM

## 2017-10-03 PROCEDURE — 97597 DBRDMT OPN WND 1ST 20 CM/<: CPT

## 2017-10-10 ENCOUNTER — HOSPITAL ENCOUNTER (OUTPATIENT)
Dept: HOSPITAL 75 - WOUNDCARE | Age: 67
End: 2017-10-10
Attending: NURSE PRACTITIONER
Payer: MEDICARE

## 2017-10-10 DIAGNOSIS — E11.621: Primary | ICD-10-CM

## 2017-10-10 DIAGNOSIS — L89.629: ICD-10-CM

## 2017-10-10 DIAGNOSIS — L89.619: ICD-10-CM

## 2017-10-10 DIAGNOSIS — L89.153: ICD-10-CM

## 2017-10-10 PROCEDURE — 99212 OFFICE O/P EST SF 10 MIN: CPT

## 2017-10-17 ENCOUNTER — HOSPITAL ENCOUNTER (OUTPATIENT)
Dept: HOSPITAL 75 - WOUNDCARE | Age: 67
End: 2017-10-17
Attending: NURSE PRACTITIONER
Payer: MEDICARE

## 2017-10-17 DIAGNOSIS — E11.621: Primary | ICD-10-CM

## 2017-10-17 DIAGNOSIS — L89.153: ICD-10-CM

## 2017-10-17 DIAGNOSIS — L89.619: ICD-10-CM

## 2017-10-17 DIAGNOSIS — L89.629: ICD-10-CM

## 2017-10-17 PROCEDURE — 11042 DBRDMT SUBQ TIS 1ST 20SQCM/<: CPT

## 2017-10-24 ENCOUNTER — HOSPITAL ENCOUNTER (OUTPATIENT)
Dept: HOSPITAL 75 - WOUNDCARE | Age: 67
End: 2017-10-24
Attending: NURSE PRACTITIONER
Payer: MEDICARE

## 2017-10-24 ENCOUNTER — HOSPITAL ENCOUNTER (OUTPATIENT)
Dept: HOSPITAL 75 - RAD | Age: 67
End: 2017-10-24
Attending: SURGERY
Payer: MEDICARE

## 2017-10-24 DIAGNOSIS — L97.409: ICD-10-CM

## 2017-10-24 DIAGNOSIS — I73.9: Primary | ICD-10-CM

## 2017-10-24 DIAGNOSIS — L89.629: ICD-10-CM

## 2017-10-24 DIAGNOSIS — E11.621: Primary | ICD-10-CM

## 2017-10-24 DIAGNOSIS — L89.619: ICD-10-CM

## 2017-10-24 DIAGNOSIS — L89.153: ICD-10-CM

## 2017-10-24 PROCEDURE — 99212 OFFICE O/P EST SF 10 MIN: CPT

## 2017-10-24 PROCEDURE — 93922 UPR/L XTREMITY ART 2 LEVELS: CPT

## 2017-10-24 NOTE — DIAGNOSTIC IMAGING REPORT
EXAMINATION: Upper and lower extremity pressure measurements of

ankle/brachial index and pulse volume recording at the ankle.



INDICATION: Claudication



FINDINGS: The ankle/brachial index on the right side is 0.38,

(.38 PT, and undetectable DP) and on the left is 0.36 (0.36 PT,

and undetectable DP). 



Pulse volume recording waveforms dampened amplitude  of moderate

degree bilaterally.





IMPRESSION: Findings suggestive of severe peripheral arterial

disease.



Dictated by: 



  Dictated on workstation # [id number]

## 2017-11-28 ENCOUNTER — HOSPITAL ENCOUNTER (OUTPATIENT)
Dept: HOSPITAL 75 - WOUNDCARE | Age: 67
End: 2017-11-28
Attending: NURSE PRACTITIONER
Payer: MEDICARE

## 2017-11-28 DIAGNOSIS — L89.153: ICD-10-CM

## 2017-11-28 DIAGNOSIS — E11.621: Primary | ICD-10-CM

## 2017-11-28 DIAGNOSIS — L89.629: ICD-10-CM

## 2017-11-28 DIAGNOSIS — L89.619: ICD-10-CM

## 2017-11-28 PROCEDURE — 97597 DBRDMT OPN WND 1ST 20 CM/<: CPT

## 2017-12-09 ENCOUNTER — HOSPITAL ENCOUNTER (OUTPATIENT)
Dept: HOSPITAL 75 - LABNPT | Age: 67
End: 2017-12-09
Attending: FAMILY MEDICINE
Payer: MEDICARE

## 2017-12-09 DIAGNOSIS — E86.0: Primary | ICD-10-CM

## 2017-12-09 LAB
ALBUMIN SERPL-MCNC: 2.9 GM/DL (ref 3.2–4.5)
ALT SERPL-CCNC: 42 U/L (ref 0–55)
ANION GAP SERPL CALC-SCNC: 11 MMOL/L (ref 5–14)
ANISOCYTOSIS BLD QL SMEAR: SLIGHT
AST SERPL-CCNC: 27 U/L (ref 5–34)
BASOPHILS # BLD AUTO: 0 10^3/UL (ref 0–0.1)
BASOPHILS NFR BLD AUTO: 0 % (ref 0–10)
BASOPHILS NFR BLD MANUAL: 0 %
BILIRUB SERPL-MCNC: 0.2 MG/DL (ref 0.1–1)
BUN SERPL-MCNC: 31 MG/DL (ref 7–18)
BUN/CREAT SERPL: 24
CALCIUM SERPL-MCNC: 8.7 MG/DL (ref 8.5–10.1)
CHLORIDE SERPL-SCNC: 119 MMOL/L (ref 98–107)
CO2 SERPL-SCNC: 21 MMOL/L (ref 21–32)
CREAT SERPL-MCNC: 1.31 MG/DL (ref 0.6–1.3)
EOSINOPHIL # BLD AUTO: 0.6 10^3/UL (ref 0–0.3)
EOSINOPHIL NFR BLD AUTO: 4 % (ref 0–10)
EOSINOPHIL NFR BLD MANUAL: 3 %
ERYTHROCYTE [DISTWIDTH] IN BLOOD BY AUTOMATED COUNT: 15.2 % (ref 10–14.5)
GFR SERPLBLD BASED ON 1.73 SQ M-ARVRAT: 40 ML/MIN
GLUCOSE SERPL-MCNC: 61 MG/DL (ref 70–105)
LYMPHOCYTES # BLD AUTO: 2.9 X 10^3 (ref 1–4)
LYMPHOCYTES NFR BLD AUTO: 20 % (ref 12–44)
MCH RBC QN AUTO: 28 PG (ref 25–34)
MCHC RBC AUTO-ENTMCNC: 31 G/DL (ref 32–36)
MCV RBC AUTO: 89 FL (ref 80–99)
MONOCYTES # BLD AUTO: 1.1 X 10^3 (ref 0–1)
MONOCYTES NFR BLD AUTO: 8 % (ref 0–12)
NEUTROPHILS # BLD AUTO: 10 X 10^3 (ref 1.8–7.8)
NEUTROPHILS NFR BLD AUTO: 69 % (ref 42–75)
NEUTS BAND NFR BLD MANUAL: 62 %
NEUTS BAND NFR BLD: 7 %
PLATELET # BLD: 506 10^3/UL (ref 130–400)
PMV BLD AUTO: 10.5 FL (ref 7.4–10.4)
POTASSIUM SERPL-SCNC: 3.5 MMOL/L (ref 3.6–5)
PROT SERPL-MCNC: 5.7 GM/DL (ref 6.4–8.2)
RBC # BLD AUTO: 3.72 10^6/UL (ref 4.35–5.85)
SODIUM SERPL-SCNC: 151 MMOL/L (ref 135–145)
VARIANT LYMPHS NFR BLD MANUAL: 19 %
WBC # BLD AUTO: 14.6 10^3/UL (ref 4.3–11)

## 2017-12-09 PROCEDURE — 85007 BL SMEAR W/DIFF WBC COUNT: CPT

## 2017-12-09 PROCEDURE — 36415 COLL VENOUS BLD VENIPUNCTURE: CPT

## 2017-12-09 PROCEDURE — 80053 COMPREHEN METABOLIC PANEL: CPT

## 2017-12-09 PROCEDURE — 85027 COMPLETE CBC AUTOMATED: CPT

## 2017-12-12 ENCOUNTER — HOSPITAL ENCOUNTER (OUTPATIENT)
Dept: HOSPITAL 75 - WOUNDCARE | Age: 67
End: 2017-12-12
Attending: NURSE PRACTITIONER
Payer: MEDICARE

## 2017-12-12 DIAGNOSIS — L89.153: ICD-10-CM

## 2017-12-12 DIAGNOSIS — L89.619: ICD-10-CM

## 2017-12-12 DIAGNOSIS — E11.621: Primary | ICD-10-CM

## 2017-12-12 DIAGNOSIS — A04.71: ICD-10-CM

## 2017-12-12 PROCEDURE — 11042 DBRDMT SUBQ TIS 1ST 20SQCM/<: CPT

## 2017-12-21 ENCOUNTER — HOSPITAL ENCOUNTER (OUTPATIENT)
Dept: HOSPITAL 75 - WOUNDCARE | Age: 67
End: 2017-12-21
Attending: NURSE PRACTITIONER
Payer: MEDICARE

## 2017-12-21 DIAGNOSIS — L89.619: Primary | ICD-10-CM

## 2017-12-21 DIAGNOSIS — A04.71: ICD-10-CM

## 2017-12-21 DIAGNOSIS — E11.621: ICD-10-CM

## 2017-12-21 DIAGNOSIS — L89.153: ICD-10-CM

## 2017-12-21 PROCEDURE — 11042 DBRDMT SUBQ TIS 1ST 20SQCM/<: CPT

## 2018-01-02 ENCOUNTER — HOSPITAL ENCOUNTER (OUTPATIENT)
Dept: HOSPITAL 75 - WOUNDCARE | Age: 68
End: 2018-01-02
Attending: NURSE PRACTITIONER
Payer: MEDICARE

## 2018-01-02 DIAGNOSIS — A04.71: ICD-10-CM

## 2018-01-02 DIAGNOSIS — L89.619: Primary | ICD-10-CM

## 2018-01-02 DIAGNOSIS — E11.621: ICD-10-CM

## 2018-01-02 DIAGNOSIS — L89.153: ICD-10-CM

## 2018-01-02 PROCEDURE — 11042 DBRDMT SUBQ TIS 1ST 20SQCM/<: CPT

## 2018-01-16 ENCOUNTER — HOSPITAL ENCOUNTER (OUTPATIENT)
Dept: HOSPITAL 75 - WOUNDCARE | Age: 68
End: 2018-01-16
Attending: NURSE PRACTITIONER
Payer: MEDICARE

## 2018-01-16 DIAGNOSIS — L89.153: ICD-10-CM

## 2018-01-16 DIAGNOSIS — A04.71: ICD-10-CM

## 2018-01-16 DIAGNOSIS — E11.621: Primary | ICD-10-CM

## 2018-01-16 DIAGNOSIS — L89.619: ICD-10-CM

## 2018-01-16 PROCEDURE — 97597 DBRDMT OPN WND 1ST 20 CM/<: CPT

## 2018-01-25 ENCOUNTER — HOSPITAL ENCOUNTER (OUTPATIENT)
Dept: HOSPITAL 75 - WOUNDCARE | Age: 68
End: 2018-01-25
Attending: NURSE PRACTITIONER
Payer: MEDICARE

## 2018-01-25 ENCOUNTER — HOSPITAL ENCOUNTER (OUTPATIENT)
Dept: HOSPITAL 75 - RAD | Age: 68
End: 2018-01-25
Attending: NURSE PRACTITIONER
Payer: MEDICARE

## 2018-01-25 DIAGNOSIS — L89.153: ICD-10-CM

## 2018-01-25 DIAGNOSIS — L89.619: Primary | ICD-10-CM

## 2018-01-25 DIAGNOSIS — A04.71: ICD-10-CM

## 2018-01-25 DIAGNOSIS — L89.619: ICD-10-CM

## 2018-01-25 DIAGNOSIS — E11.621: Primary | ICD-10-CM

## 2018-01-25 PROCEDURE — 97597 DBRDMT OPN WND 1ST 20 CM/<: CPT

## 2018-01-25 PROCEDURE — 73650 X-RAY EXAM OF HEEL: CPT

## 2018-01-25 NOTE — DIAGNOSTIC IMAGING REPORT
INDICATION: Heel ulcer.



TIME OF EXAM: 2:09 p.m.



FINDINGS:  Two views of the calcaneus were obtained. There is a

large plantar calcaneal spur. No definite bony destructive

changes are seen. No soft tissue gas is identified.



IMPRESSION: No bony destruction is identified. If there is

concern for osteomyelitis, MRI could be performed for further

evaluation.



Dictated by: 



  Dictated on workstation # [id number]

## 2018-02-08 ENCOUNTER — HOSPITAL ENCOUNTER (OUTPATIENT)
Dept: HOSPITAL 75 - WOUNDCARE | Age: 68
End: 2018-02-08
Attending: NURSE PRACTITIONER
Payer: MEDICARE

## 2018-02-08 DIAGNOSIS — E11.621: Primary | ICD-10-CM

## 2018-02-08 DIAGNOSIS — L89.619: ICD-10-CM

## 2018-02-08 DIAGNOSIS — A04.71: ICD-10-CM

## 2018-02-08 DIAGNOSIS — L89.153: ICD-10-CM

## 2018-02-08 PROCEDURE — 99213 OFFICE O/P EST LOW 20 MIN: CPT

## 2018-02-22 ENCOUNTER — HOSPITAL ENCOUNTER (OUTPATIENT)
Dept: HOSPITAL 75 - WOUNDCARE | Age: 68
End: 2018-02-22
Attending: NURSE PRACTITIONER
Payer: MEDICARE

## 2018-02-22 DIAGNOSIS — E11.621: Primary | ICD-10-CM

## 2018-02-22 DIAGNOSIS — L89.619: ICD-10-CM

## 2018-02-22 DIAGNOSIS — L89.153: ICD-10-CM

## 2018-03-15 ENCOUNTER — HOSPITAL ENCOUNTER (OUTPATIENT)
Dept: HOSPITAL 75 - WOUNDCARE | Age: 68
End: 2018-03-15
Attending: NURSE PRACTITIONER
Payer: MEDICARE

## 2018-03-15 DIAGNOSIS — L89.153: ICD-10-CM

## 2018-03-15 DIAGNOSIS — L89.619: ICD-10-CM

## 2018-03-15 DIAGNOSIS — A04.71: ICD-10-CM

## 2018-03-15 DIAGNOSIS — E11.621: Primary | ICD-10-CM

## 2018-03-15 PROCEDURE — 99213 OFFICE O/P EST LOW 20 MIN: CPT

## 2018-04-05 ENCOUNTER — HOSPITAL ENCOUNTER (OUTPATIENT)
Dept: HOSPITAL 75 - WOUNDCARE | Age: 68
End: 2018-04-05
Attending: NURSE PRACTITIONER
Payer: MEDICARE

## 2018-04-05 DIAGNOSIS — A04.71: ICD-10-CM

## 2018-04-05 DIAGNOSIS — L89.619: ICD-10-CM

## 2018-04-05 DIAGNOSIS — E11.621: Primary | ICD-10-CM

## 2018-04-05 DIAGNOSIS — L89.153: ICD-10-CM

## 2018-04-05 PROCEDURE — 99212 OFFICE O/P EST SF 10 MIN: CPT

## 2018-07-02 ENCOUNTER — HOSPITAL ENCOUNTER (EMERGENCY)
Dept: HOSPITAL 75 - ER | Age: 68
LOS: 1 days | Discharge: HOME | End: 2018-07-03
Payer: MEDICARE

## 2018-07-02 VITALS — HEIGHT: 61 IN | WEIGHT: 172.12 LBS | BODY MASS INDEX: 32.5 KG/M2

## 2018-07-02 DIAGNOSIS — Z88.5: ICD-10-CM

## 2018-07-02 DIAGNOSIS — I25.10: ICD-10-CM

## 2018-07-02 DIAGNOSIS — S02.2XXA: Primary | ICD-10-CM

## 2018-07-02 DIAGNOSIS — M25.572: ICD-10-CM

## 2018-07-02 DIAGNOSIS — E11.9: ICD-10-CM
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
## 2018-07-02 DIAGNOSIS — Z86.73: ICD-10-CM

## 2018-07-02 DIAGNOSIS — Z88.0: ICD-10-CM

## 2018-07-02 DIAGNOSIS — E78.00: ICD-10-CM

## 2018-07-02 DIAGNOSIS — Z87.59: ICD-10-CM

## 2018-07-02 DIAGNOSIS — Z79.02: ICD-10-CM

## 2018-07-02 DIAGNOSIS — J44.9: ICD-10-CM

## 2018-07-02 DIAGNOSIS — S00.81XA: ICD-10-CM

## 2018-07-02 DIAGNOSIS — I10: ICD-10-CM

## 2018-07-02 DIAGNOSIS — F32.9: ICD-10-CM

## 2018-07-02 PROCEDURE — 70486 CT MAXILLOFACIAL W/O DYE: CPT

## 2018-07-02 PROCEDURE — 70450 CT HEAD/BRAIN W/O DYE: CPT

## 2018-07-02 PROCEDURE — 73610 X-RAY EXAM OF ANKLE: CPT

## 2018-07-02 PROCEDURE — 72125 CT NECK SPINE W/O DYE: CPT
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
## 2018-07-03 VITALS — DIASTOLIC BLOOD PRESSURE: 82 MMHG | SYSTOLIC BLOOD PRESSURE: 193 MMHG

## 2018-07-03 NOTE — DIAGNOSTIC IMAGING REPORT
INDICATION: Left ankle pain status post fall



COMPARISON: None.



FINDINGS: 3 views of the left ankle were obtained. There is no

acute fracture or dislocation. No focal osseous lesions are seen.

The surrounding soft tissue structures are unremarkable. There

are no radiopaque foreign bodies. Note is made of generalized

soft tissue edema.



IMPRESSION:  

1. No acute fracture or dislocation in the left ankle.

2. Generalized soft tissue edema.



Dictated by: 



  Dictated on workstation # [id number]

## 2018-07-03 NOTE — DIAGNOSTIC IMAGING REPORT
PROCEDURE: CT head, face, and cervical spine without contrast.



TECHNIQUE: Multiple contiguous axial images were obtained through

the head, neck, and facial bones without the use of intravenous

contrast. Sagittal and coronal reformations through the cervical

spine and facial bones were also performed.



INDICATION: Fall. Right-sided forehead hematoma.



COMPARISON: 07/24/2017



FINDINGS: CT head: The ventricles and cortical sulci are

diffusely prominent, compatible with age-related volume loss.

There are confluent areas of abnormal, low attenuation in the

periventricular white matter. This is consistent with chronic

small vessel ischemic changes.  



There is no midline shift or mass-effect. No acute intra-axial

hemorrhage is seen. There are no abnormal areas of increased or

decreased density to suggest acute hemorrhage or edema. No

extra-axial masses or collections are present. Moderate-sized

soft tissue hematoma is noted overlying the anterior right

frontal calvarium. The underlying bony calvarium is intact.



CT facial bones:. Mildly displaced bilateral nasal bone fractures

are identified. These, however, are age indeterminate. Note is

made of similar position of the nasal bones on previous CT head

dated 7/6/2017. Evaluation of fine bony detail on that exam,

however, suboptimal secondary to routine CT head technique.



No other acute appearing osseous abnormalities of the facial

bones are identified. There is no other evidence of acute

fracture or dislocation. Zygomatic arches are intact. Medial and

lateral pterygoid plates are intact as well. There is no fracture

or dislocation of the mandible. There is no fracture of the

alveolar ridge of the maxilla.



Paranasal sinuses are clear. There is no significant mucosal

thickening or abnormal air-fluid level. There is no CT evidence

of acute fracture of the paranasal sinuses. Nasal septum is

essentially midline. There is no fracture of the orbits.

Supraorbital soft tissue swelling is again noted on the right. No

unexpected radiopaque foreign bodies are seen. Globes are

symmetric.



CT cervical spine: Static alignment of the cervical spine is

maintained. There is no significant anteroretrolisthesis. There

is no evidence of jumped facets. Vertebral body heights are

maintained as well. There is no evidence of acute fracture of the

cervical spine. There are moderate multilevel degenerative

changes consisting of intervertebral disc height loss as well as

multilevel facet arthropathy.



Pre-and paravertebral soft tissue structures are unremarkable.

Note is made of bilateral calcified carotid atherosclerosis.

Included portions of the lung apices are clear.



IMPRESSION:  

1. No acute intracranial abnormality. No CT evidence of mass,

acute infarct or intracranial hemorrhage.

2. Chronic small vessel ischemic changes in the deep white

matter.

3. Minimally displaced bilateral nasal bone fracture;

age-indeterminate.

3. Moderate soft tissue swelling overlying the anterior right

calvarium. No evidence of underlying calvarial fracture.

5. No CT evidence of acute fracture or dislocation of the

cervical spine.



Dictated by: 



  Dictated on workstation # [id number]

## 2018-09-26 ENCOUNTER — HOSPITAL ENCOUNTER (INPATIENT)
Dept: HOSPITAL 75 - ER | Age: 68
LOS: 6 days | Discharge: NURSING, REHAB OR LONG-TERM CARE | DRG: 871 | End: 2018-10-02
Attending: FAMILY MEDICINE | Admitting: FAMILY MEDICINE
Payer: MEDICARE

## 2018-09-26 VITALS — HEIGHT: 66 IN | WEIGHT: 202 LBS | BODY MASS INDEX: 32.47 KG/M2

## 2018-09-26 VITALS — SYSTOLIC BLOOD PRESSURE: 139 MMHG | DIASTOLIC BLOOD PRESSURE: 63 MMHG

## 2018-09-26 VITALS — SYSTOLIC BLOOD PRESSURE: 121 MMHG | DIASTOLIC BLOOD PRESSURE: 62 MMHG

## 2018-09-26 VITALS — DIASTOLIC BLOOD PRESSURE: 75 MMHG | SYSTOLIC BLOOD PRESSURE: 124 MMHG

## 2018-09-26 VITALS — SYSTOLIC BLOOD PRESSURE: 150 MMHG | DIASTOLIC BLOOD PRESSURE: 74 MMHG

## 2018-09-26 DIAGNOSIS — A41.9: Primary | ICD-10-CM

## 2018-09-26 DIAGNOSIS — L89.150: ICD-10-CM

## 2018-09-26 DIAGNOSIS — J44.9: ICD-10-CM

## 2018-09-26 DIAGNOSIS — I12.9: ICD-10-CM

## 2018-09-26 DIAGNOSIS — E78.00: ICD-10-CM

## 2018-09-26 DIAGNOSIS — E11.9: ICD-10-CM

## 2018-09-26 DIAGNOSIS — E66.01: ICD-10-CM

## 2018-09-26 DIAGNOSIS — B96.4: ICD-10-CM

## 2018-09-26 DIAGNOSIS — R53.83: ICD-10-CM

## 2018-09-26 DIAGNOSIS — D49.6: ICD-10-CM

## 2018-09-26 DIAGNOSIS — F32.9: ICD-10-CM

## 2018-09-26 DIAGNOSIS — B95.2: ICD-10-CM

## 2018-09-26 DIAGNOSIS — E86.0: ICD-10-CM

## 2018-09-26 DIAGNOSIS — R26.81: ICD-10-CM

## 2018-09-26 DIAGNOSIS — Z86.73: ICD-10-CM

## 2018-09-26 DIAGNOSIS — M54.9: ICD-10-CM

## 2018-09-26 DIAGNOSIS — L89.620: ICD-10-CM

## 2018-09-26 DIAGNOSIS — E87.0: ICD-10-CM

## 2018-09-26 DIAGNOSIS — F17.210: ICD-10-CM

## 2018-09-26 DIAGNOSIS — G93.41: ICD-10-CM

## 2018-09-26 DIAGNOSIS — Z87.19: ICD-10-CM

## 2018-09-26 DIAGNOSIS — N30.00: ICD-10-CM

## 2018-09-26 DIAGNOSIS — M19.91: ICD-10-CM

## 2018-09-26 DIAGNOSIS — N18.9: ICD-10-CM

## 2018-09-26 DIAGNOSIS — K59.09: ICD-10-CM

## 2018-09-26 DIAGNOSIS — A04.72: ICD-10-CM

## 2018-09-26 DIAGNOSIS — I25.10: ICD-10-CM

## 2018-09-26 DIAGNOSIS — R41.0: ICD-10-CM

## 2018-09-26 DIAGNOSIS — M54.2: ICD-10-CM

## 2018-09-26 LAB
ALBUMIN SERPL-MCNC: 3.9 GM/DL (ref 3.2–4.5)
ALP SERPL-CCNC: 73 U/L (ref 40–136)
ALT SERPL-CCNC: 9 U/L (ref 0–55)
APTT BLD: 31 SEC (ref 24–35)
APTT PPP: YELLOW S
BACTERIA #/AREA URNS HPF: (no result) /HPF
BASOPHILS # BLD AUTO: 0 10^3/UL (ref 0–0.1)
BASOPHILS NFR BLD AUTO: 0 % (ref 0–10)
BILIRUB SERPL-MCNC: 0.6 MG/DL (ref 0.1–1)
BILIRUB UR QL STRIP: NEGATIVE
BUN/CREAT SERPL: 18
CALCIUM SERPL-MCNC: 9.7 MG/DL (ref 8.5–10.1)
CHLORIDE SERPL-SCNC: 116 MMOL/L (ref 98–107)
CO2 SERPL-SCNC: 23 MMOL/L (ref 21–32)
CREAT SERPL-MCNC: 1.37 MG/DL (ref 0.6–1.3)
EOSINOPHIL # BLD AUTO: 0 10^3/UL (ref 0–0.3)
EOSINOPHIL NFR BLD AUTO: 0 % (ref 0–10)
ERYTHROCYTE [DISTWIDTH] IN BLOOD BY AUTOMATED COUNT: 15 % (ref 10–14.5)
FIBRINOGEN PPP-MCNC: (no result) MG/DL
GFR SERPLBLD BASED ON 1.73 SQ M-ARVRAT: 38 ML/MIN
GLUCOSE SERPL-MCNC: 209 MG/DL (ref 70–105)
GLUCOSE UR STRIP-MCNC: NEGATIVE MG/DL
HCT VFR BLD CALC: 40 % (ref 35–52)
HGB BLD-MCNC: 12.9 G/DL (ref 11.5–16)
INR PPP: 1.1 (ref 0.8–1.4)
KETONES UR QL STRIP: (no result)
LEUKOCYTE ESTERASE UR QL STRIP: (no result)
LYMPHOCYTES # BLD AUTO: 2.6 X 10^3 (ref 1–4)
LYMPHOCYTES NFR BLD AUTO: 18 % (ref 12–44)
MANUAL DIFFERENTIAL PERFORMED BLD QL: YES
MCH RBC QN AUTO: 30 PG (ref 25–34)
MCHC RBC AUTO-ENTMCNC: 32 G/DL (ref 32–36)
MCV RBC AUTO: 92 FL (ref 80–99)
MONOCYTES # BLD AUTO: 1.1 X 10^3 (ref 0–1)
MONOCYTES NFR BLD AUTO: 7 % (ref 0–12)
MONOCYTES NFR BLD: 6 %
NEUTROPHILS # BLD AUTO: 11.2 X 10^3 (ref 1.8–7.8)
NEUTROPHILS NFR BLD AUTO: 75 % (ref 42–75)
NEUTS BAND NFR BLD MANUAL: 74 %
NITRITE UR QL STRIP: NEGATIVE
PH UR STRIP: 5 [PH] (ref 5–9)
PLATELET # BLD: 456 10^3/UL (ref 130–400)
PMV BLD AUTO: 10.5 FL (ref 7.4–10.4)
POTASSIUM SERPL-SCNC: 4.1 MMOL/L (ref 3.6–5)
PROT SERPL-MCNC: 7.5 GM/DL (ref 6.4–8.2)
PROT UR QL STRIP: (no result)
PROTHROMBIN TIME: 14.2 SEC (ref 12.2–14.7)
RBC # BLD AUTO: 4.37 10^6/UL (ref 4.35–5.85)
RBC #/AREA URNS HPF: (no result) /HPF
RBC MORPH BLD: NORMAL
SODIUM SERPL-SCNC: 149 MMOL/L (ref 135–145)
SP GR UR STRIP: 1.01 (ref 1.02–1.02)
SQUAMOUS #/AREA URNS HPF: >50 /HPF
UROBILINOGEN UR-MCNC: NORMAL MG/DL
VARIANT LYMPHS NFR BLD MANUAL: 16 %
VARIANT LYMPHS NFR BLD MANUAL: 4 %
WBC # BLD AUTO: 15 10^3/UL (ref 4.3–11)
WBC #/AREA URNS HPF: (no result) /HPF

## 2018-09-26 PROCEDURE — 87077 CULTURE AEROBIC IDENTIFY: CPT

## 2018-09-26 PROCEDURE — 87088 URINE BACTERIA CULTURE: CPT

## 2018-09-26 PROCEDURE — 96374 THER/PROPH/DIAG INJ IV PUSH: CPT

## 2018-09-26 PROCEDURE — 85027 COMPLETE CBC AUTOMATED: CPT

## 2018-09-26 PROCEDURE — 96361 HYDRATE IV INFUSION ADD-ON: CPT

## 2018-09-26 PROCEDURE — 81000 URINALYSIS NONAUTO W/SCOPE: CPT

## 2018-09-26 PROCEDURE — 80048 BASIC METABOLIC PNL TOTAL CA: CPT

## 2018-09-26 PROCEDURE — 36415 COLL VENOUS BLD VENIPUNCTURE: CPT

## 2018-09-26 PROCEDURE — 80053 COMPREHEN METABOLIC PANEL: CPT

## 2018-09-26 PROCEDURE — 71045 X-RAY EXAM CHEST 1 VIEW: CPT

## 2018-09-26 PROCEDURE — 82962 GLUCOSE BLOOD TEST: CPT

## 2018-09-26 PROCEDURE — 83605 ASSAY OF LACTIC ACID: CPT

## 2018-09-26 PROCEDURE — 85007 BL SMEAR W/DIFF WBC COUNT: CPT

## 2018-09-26 PROCEDURE — 85610 PROTHROMBIN TIME: CPT

## 2018-09-26 PROCEDURE — 51702 INSERT TEMP BLADDER CATH: CPT

## 2018-09-26 PROCEDURE — 87040 BLOOD CULTURE FOR BACTERIA: CPT

## 2018-09-26 PROCEDURE — 83735 ASSAY OF MAGNESIUM: CPT

## 2018-09-26 PROCEDURE — 84100 ASSAY OF PHOSPHORUS: CPT

## 2018-09-26 PROCEDURE — 85025 COMPLETE CBC W/AUTO DIFF WBC: CPT

## 2018-09-26 PROCEDURE — 87186 SC STD MICRODIL/AGAR DIL: CPT

## 2018-09-26 PROCEDURE — 70450 CT HEAD/BRAIN W/O DYE: CPT

## 2018-09-26 PROCEDURE — 85730 THROMBOPLASTIN TIME PARTIAL: CPT

## 2018-09-26 RX ADMIN — FAMOTIDINE SCH MG: 20 TABLET, FILM COATED ORAL at 21:49

## 2018-09-26 RX ADMIN — SODIUM CHLORIDE SCH MLS/HR: 4.5 INJECTION, SOLUTION INTRAVENOUS at 21:50

## 2018-09-26 RX ADMIN — INSULIN ASPART SCH UNIT: 100 INJECTION, SOLUTION INTRAVENOUS; SUBCUTANEOUS at 21:39

## 2018-09-26 RX ADMIN — ATORVASTATIN CALCIUM SCH MG: 40 TABLET, FILM COATED ORAL at 21:49

## 2018-09-26 NOTE — ED GENERAL
General


Chief Complaint:  Altered Mental Status


Stated Complaint:  POSS STROKE


Source of Information:  Patient, EMS, Nursing Home Records


Exam Limitations:  No Limitations





History of Present Illness


Date Seen by Provider:  Sep 26, 2018


Time Seen by Provider:  16:23


Initial Comments


Patient presents to the ER by EMS from the [facility] with chief 

complaint per staff. She was not acting herself for the past day not talking 

lethargic, somnolent. The patient is diabetic but no blood sugar was reported 

and EMS glucometer apparently batteries broke on route. The patient would nod 

yes or no answer questions but was not able to speak. Appeared to Be very dry 

on initial exam per EMS so they started a liter of saline. EMS reported a fever 

as well as staff around 101. No mention of any NSAIDs or Tylenol. History of 

chronic kidney disease, diabetes, anemia known to Dr. Moser. According to 

records were faxed ahead of time looks like her creatinine has ranged from 1.1-

1.6 over the past couple months. Patient also is being treated outpatient with 

vancomycin oral for a history of C. difficile colitis. No mention of diarrhea 

per staff today.





Allergies and Home Medications


Allergies


Coded Allergies:  


     morphine (Unverified  Allergy, Mild, 14)


 


 "ACTS WEIRD"


     Penicillins (Unverified  Allergy, Unknown, 08)





Home Medications


Albuterol Sulfate 18 Gm Hfa.aer.ad, 2 PUFF IH Q8H PRN for SHORTNESS OF BREATH, (

Reported)


Amlodipine Besylate 5 Mg Tablet, 5 MG PO DAILY, (Reported)


Atorvastatin Calcium 40 Mg Tablet, 40 MG PO HS, (Reported)


Clonazepam 0.5 Mg Tab.rapdis, 0.5 MG PO Q12H PRN for ANXIETY, (Reported)


Clopidogrel Bisulfate 75 Mg Tablet, 75 MG PO DAILY, (Reported)


Diphenhydramine HCl 25 Mg Capsule, 25 MG PO Q8H PRN for ITCHING, (Reported)


Docusate Sodium 100 Mg Capsule, 100 MG PO Q12H PRN for CONSTIPATION-1ST LINE, (

Reported)


Escitalopram Oxalate 20 Mg Tablet, 20 MG PO DAILY, (Reported)


Famotidine 20 Mg Tablet, 20 MG PO BID, (Reported)


Ferrous Sulfate 325 Mg Tablet, 325 MG PO DAILY, (Reported)


Magnesium Hydroxide 400 Mg/5 Ml Oral.susp, 30 ML PO Q12H PRN for CONSTIPATION-

7TH LINE, (Reported)


Magnesium Oxide 400 Mg Tablet, 400 MG PO BID, (Reported)


Menthol/Camphor 56 Gm Cream..g., TP Q6H PRN for JOINT PAIN, (Reported)


Mirtazapine 15 Mg Tablet, 15 MG PO HS, (Reported)


Multivits,Stress Formula/Zinc 1 Each Tablet, 1 TAB PO DAILY, (Reported)


Nut.tx.impaired Digest Fxn 200 Ml Liquid, 200 ML PO TID, (Reported)


Nystatin/Triamcinolone 15 Gm Cr, TP TID, (Reported)


Ondansetron HCl 4 Mg Tab, 4 MG PO Q6H PRN for NAUSEA/VOMITING-1ST LINE, (

Reported)


Potassium Chloride 10 Meq Tablet.er, 10 MEQ PO BID, (Reported)


Protein Supplement 946 Ml Liquid, 30 ML PO DAILY, (Reported)


Tramadol HCl 50 Mg Tablet, 50 MG PO Q12H PRN for PAIN-MODERATE, (Reported)


Vancomycin HCl 125 Mg/2.5 Ml Syringe, 125 MG PO Q6H


   Prescribed by: DEMI ROWE on 17 1127


[Barrier Cream]  , TOP BID, (Reported)


   APPLY TO BILATERAL BUTTOCKS FOR PREVENTATIVE MEASURES 





Patient Home Medication List


Home Medication List Reviewed:  Yes





Review of Systems


Review of Systems


Constitutional:  see HPI (patient is unable to give any meaningful review of 

systems.)





Past Medical-Social-Family Hx


Patient Social History


Smoking Status:  Current Everyday Smoker


Type Used:  Cigarettes


2nd Hand Smoke Exposure:  No


Recent Hopitalizations:  No





Immunizations Up To Date


Tetanus Booster (TDap):  Unknown


Date of Pneumonia Vaccine:  Oct 17, 2016





Seasonal Allergies


Seasonal Allergies:  No





Past Medical History


Surgeries:  Yes (CATARACTS)


 Section, Eye Surgery, Gallbladder


Respiratory:  Yes


COPD


Cardiac:  Yes


Coronary Artery Disease, High Cholesterol, Hypertension


Neurological:  Yes


Stroke


Reproductive Disorders:  No


Female Reproductive Disorders:  Denies


GYN History:  Menopausal


Sexually Transmitted Disease:  No


HIV/AIDS:  No


Genitourinary:  No


Gastrointestinal:  Yes


Chronic Constipation


Musculoskeletal:  Yes (CHRONIC NECK AND BACK PAIN ; UNSTEADY GAIT AND 

GENERALIZED WEAKNESS)


Arthritis, Chronic Back Pain


Endocrine:  Yes


Diabetes, Non-Insulin dep


HEENT:  No


Cancer:  No


Psychosocial:  Yes


Depression


Integumentary:  No


Blood Disorders:  No


Adverse Reaction/Blood Tranf:  No





Family Medical History





Patient reports no known family medical history.


No Pertinent Family Hx





Physical Exam-Suspected Sepsis


Physical Exam


Vital Signs





Vital Signs - First Documented








 18





 16:15 16:30


 


Temp 100.1 


 


Pulse 104 


 


Resp 20 


 


B/P (MAP) 128/58 (81) 


 


Pulse Ox  93


 


O2 Delivery  Nasal Cannula


 


O2 Flow Rate  2.00





Capillary Refill :


Height, Weight, BMI


Height: 5'1.00"


Weight: 172lbs. 2.0oz. 78.942788wc; 32.5 BMI


Method:Stated


General Appearance:  Moderate Distress, Obese


Eyes:  Bilateral Eye Normal Inspection, Bilateral Eye PERRL, Bilateral Eye EOMI


HEENT:  PERRL/EOMI, TMs Normal, Normal ENT Inspection; No Pharynx Normal (

oropharynx is exceptionally dry), No Moist Mucous Membranes


Neck:  Full Range of Motion, Supple


Respiratory:  Chest Non Tender, Lungs Clear, Normal Breath Sounds, No Accessory 

Muscle Use, No Respiratory Distress


Cardiovascular:  Regular Rate, Rhythm, No Edema, Normal Peripheral Pulses


Gastrointestinal:  Normal Bowel Sounds, Non Tender, Soft


Genital/Rectal:  Normal Genital Exam


Extremity:  Non Tender, No Calf Tenderness, No Pedal Edema, Other (there is a 

unstageable heel pressure ulcer on the left foot and a padded heel protector 

around the right foot with no obvious pressure ulcer. There is a unstageable 

sacral pressure ulcer)


Neurologic/Psychiatric:  Alert, Other (oriented to person and place, lethargic)


Skin:  normal color, warm/dry





Focused Exam


Lactate Level


18 16:20: Lactic Acid Level 1.32





Lactic Acid Level





Laboratory Tests








Test


 18


16:20


 


Lactic Acid Level


 1.32 MMOL/L


(0.50-2.00)











Progress/Results/Core Measures


Suspected Sepsis


SIRS


Temperature: 


Pulse:  


Respiratory Rate: 


 


Laboratory Tests


18 16:20: White Blood Count 15.0H


Blood Pressure  / 


Mean: 


 





18 16:20: Lactic Acid Level 1.32


Laboratory Tests


18 16:20: 


Creatinine 1.37H, INR Comment 1.1, Platelet Count 456H, Total Bilirubin 0.6








Results/Orders


Lab Results





Laboratory Tests








Test


 18


16:18 18


16:20 18


16:30 Range/Units


 


 


Glucometer 200 H     MG/DL


 


White Blood Count


 


 15.0 H


 


 4.3-11.0


10^3/uL


 


Red Blood Count


 


 4.37 


 


 4.35-5.85


10^6/uL


 


Hemoglobin  12.9   11.5-16.0  G/DL


 


Hematocrit  40   35-52  %


 


Mean Corpuscular Volume  92   80-99  FL


 


Mean Corpuscular Hemoglobin  30   25-34  PG


 


Mean Corpuscular Hemoglobin


Concent 


 32 


 


 32-36  G/DL





 


Red Cell Distribution Width  15.0 H  10.0-14.5  %


 


Platelet Count


 


 456 H


 


 130-400


10^3/uL


 


Mean Platelet Volume  10.5 H  7.4-10.4  FL


 


Neutrophils (%) (Auto)  75   42-75  %


 


Lymphocytes (%) (Auto)  18   12-44  %


 


Monocytes (%) (Auto)  7   0-12  %


 


Eosinophils (%) (Auto)  0   0-10  %


 


Basophils (%) (Auto)  0   0-10  %


 


Neutrophils # (Auto)  11.2 H  1.8-7.8  X 10^3


 


Lymphocytes # (Auto)  2.6   1.0-4.0  X 10^3


 


Monocytes # (Auto)  1.1 H  0.0-1.0  X 10^3


 


Eosinophils # (Auto)


 


 0.0 


 


 0.0-0.3


10^3/uL


 


Basophils # (Auto)


 


 0.0 


 


 0.0-0.1


10^3/uL


 


Neutrophils % (Manual)  74    %


 


Lymphocytes % (Manual)  16    %


 


Monocytes % (Manual)  6    %


 


Reactive Lymphocytes  4    %


 


Blood Morphology Comment  NORMAL    


 


Prothrombin Time  14.2   12.2-14.7  SEC


 


INR Comment  1.1   0.8-1.4  


 


Activated Partial


Thromboplast Time 


 31 


 


 24-35  SEC





 


Sodium Level  149 H  135-145  MMOL/L


 


Potassium Level  4.1   3.6-5.0  MMOL/L


 


Chloride Level  116 H    MMOL/L


 


Carbon Dioxide Level  23   21-32  MMOL/L


 


Anion Gap  10   5-14  MMOL/L


 


Blood Urea Nitrogen  25 H  7-18  MG/DL


 


Creatinine


 


 1.37 H


 


 0.60-1.30


MG/DL


 


Estimat Glomerular Filtration


Rate 


 38 


 


  





 


BUN/Creatinine Ratio  18    


 


Glucose Level  209 H    MG/DL


 


Lactic Acid Level


 


 1.32 


 


 0.50-2.00


MMOL/L


 


Calcium Level  9.7   8.5-10.1  MG/DL


 


Corrected Calcium  9.8   8.5-10.1  MG/DL


 


Total Bilirubin  0.6   0.1-1.0  MG/DL


 


Aspartate Amino Transf


(AST/SGOT) 


 13 


 


 5-34  U/L





 


Alanine Aminotransferase


(ALT/SGPT) 


 9 


 


 0-55  U/L





 


Alkaline Phosphatase  73     U/L


 


Total Protein  7.5   6.4-8.2  GM/DL


 


Albumin  3.9   3.2-4.5  GM/DL


 


Urine Color   YELLOW   


 


Urine Clarity   VERY CLOUDY H  


 


Urine pH   5  5-9  


 


Urine Specific Gravity   1.015 L 1.016-1.022  


 


Urine Protein   3+ H NEGATIVE  


 


Urine Glucose (UA)   NEGATIVE  NEGATIVE  


 


Urine Ketones   1+ H NEGATIVE  


 


Urine Nitrite   NEGATIVE  NEGATIVE  


 


Urine Bilirubin   NEGATIVE  NEGATIVE  


 


Urine Urobilinogen   NORMAL  NORMAL  MG/DL


 


Urine Leukocyte Esterase   3+ H NEGATIVE  


 


Urine RBC (Auto)   5+ H NEGATIVE  


 


Urine RBC   NONE   /HPF


 


Urine WBC    H  /HPF


 


Urine Squamous Epithelial


Cells 


 


 >50 H


  /HPF





 


Urine Crystals   NONE   /LPF


 


Urine Bacteria   FEW H  /HPF


 


Urine Casts   NONE   /LPF


 


Urine Mucus   NEGATIVE   /LPF


 


Urine Culture Indicated   NO   








My Orders





Orders - CESAR MILLS


Cbc With Automated Diff (18 16:29)


Comprehensive Metabolic Panel (18 16:29)


Blood Culture (18 16:29)


Sputum Culture (18 16:29)


Urinalysis (18 16:29)


Urine Culture (18 16:29)


Protime With Inr (18 16:29)


Partial Thromboplastin Time (18 16:29)


Chest 1 View, Ap/Pa Only (18 16:29)


Saline Lock/Iv-Start (18 16:29)


Saline Lock/Iv-Start (18 16:29)


Vital Signs Adult Sepsis Patie Q15M (18 16:29)


O2 (18 16:29)


Remove Rings In Anticipation O (18 16:29)


Lactic Acid Analyzer (18 16:29)


Ns Iv 1000 Ml (Sodium Chloride 0.9%) (18 16:30)


Cefepime Injection (Maxipime Injection) (18 16:30)


Catheter(Urinary) Insert & Ass 03,15 (18 16:29)


Manual Differential (18 16:20)





Medications Given in ED





Current Medications








 Medications  Dose


 Ordered  Sig/Corona


 Route  Start Time


 Stop Time Status Last Admin


Dose Admin


 


 Cefepime HCl 1000


 mg/Sodium Chloride  50 ml @ 


 100 mls/hr  ONCE  ONCE


 IV  18 16:30


 18 16:59 DC 18 17:12


100 MLS/HR


 


 Sodium Chloride  2,000 ml @ 


 2,000 mls/hr  ONCE  ONCE


 IV  18 16:30


 18 17:29 DC 18 16:15


2,000 MLS/HR








Vital Signs/I&O











 18





 16:15 16:30


 


Temp 100.1 


 


Pulse 104 


 


Resp 20 


 


B/P (MAP) 128/58 (81) 


 


Pulse Ox  93


 


O2 Delivery  Nasal Cannula


 


O2 Flow Rate  2.00





Capillary Refill :


Progress Note :  


   Time:  18:29


Progress Note


UTI, sepsis, delirium. Cefepime, 20 mL/kg IV fluid bolus and admission. She is 

mentating and talking a little bit now after some fluids. Initial blood glucose 

in the ER was 200.





Diagnostic Imaging





   Diagonstic Imaging:  Xray


   Plain Films/CT/US/NM/MRI:  chest (1v)


Comments





NAME:      DESTINEY REYNOLDS [facility] REC#:   [id number]


ACCOUNT#:   [id number]


PHYSICIAN:    CESAR MILLS MD


 








CC:   PUMA BISWAS MD; CESAR MILLS


 Page 1 of 1


 RADIOLOGY REPORT





 [facility].


 [place], [place]





CC:   PUMA BISWAS MD; CESAR MILLS


 Page 1 of 1


 RADIOLOGY REPORT





[id number]


ACCOUNT#:   [id number]


PT STATUS:   REG ER


:      1950


PHYSICIAN:    CESAR MILLS MD


ADMIT DATE:   18/ER


 ***Signed***


Date of Exam:   18





CHEST 1 VIEW, AP/PA ONLY


 





INDICATION: Altered mental status.





FINDINGS: Upright portable chest shows normal heart size and


vascularity. The lungs are clear. There is no effusion or


pneumothorax. There is no bony abnormality.





IMPRESSION: No acute abnormality is seen with no change from


2017.





Dictated by: 





  Dictated on workstation # [id number]





[id number]





Dict:      18


Trans:      18





Interpreted by:         PUMA BISWAS MD


Electronically signed by:   PUMA BISWAS MD 18


   Reviewed:  Reviewed by Me





Departure


Communication (Admissions)


Time/Spoke to Admitting Phy:  18:40


Discussed case lab imaging findings and he would like a CBC, CMP and he is okay 

with changing the antibiotics to Rocephin in the morning. Intravenous fluids. 

Sliding scale insulin.





Impression





 Primary Impression:  


 UTI (urinary tract infection)


 Qualified Codes:  N30.00 - Acute cystitis without hematuria


 Additional Impressions:  


 Sepsis


 Qualified Codes:  A41.9 - Sepsis, unspecified organism


 Delirium due to another medical condition, acute, hypoactive


Disposition:   ADMITTED AS INPATIENT


Condition:  Improved





Admissions


Decision to Admit Reason:  Admit from ER (General)


Decision to Admit/Date:  Sep 26, 2018


Time/Decision to Admit Time:  18:49





Departure-Patient Inst.


Referrals:  


RODY MOSER DO (PCP/Family)


Primary Care Physician











CESAR MILLS Sep 26, 2018 16:40

## 2018-09-26 NOTE — DIAGNOSTIC IMAGING REPORT
INDICATION: Altered mental status.



FINDINGS: Upright portable chest shows normal heart size and

vascularity. The lungs are clear. There is no effusion or

pneumothorax. There is no bony abnormality.



IMPRESSION: No acute abnormality is seen with no change from

07/24/2017.



Dictated by: 



  Dictated on workstation # [id number]

## 2018-09-27 VITALS — DIASTOLIC BLOOD PRESSURE: 58 MMHG | SYSTOLIC BLOOD PRESSURE: 131 MMHG

## 2018-09-27 VITALS — DIASTOLIC BLOOD PRESSURE: 87 MMHG | SYSTOLIC BLOOD PRESSURE: 121 MMHG

## 2018-09-27 VITALS — SYSTOLIC BLOOD PRESSURE: 146 MMHG | DIASTOLIC BLOOD PRESSURE: 69 MMHG

## 2018-09-27 VITALS — SYSTOLIC BLOOD PRESSURE: 133 MMHG | DIASTOLIC BLOOD PRESSURE: 62 MMHG

## 2018-09-27 VITALS — SYSTOLIC BLOOD PRESSURE: 145 MMHG | DIASTOLIC BLOOD PRESSURE: 69 MMHG

## 2018-09-27 VITALS — DIASTOLIC BLOOD PRESSURE: 62 MMHG | SYSTOLIC BLOOD PRESSURE: 157 MMHG

## 2018-09-27 VITALS — SYSTOLIC BLOOD PRESSURE: 143 MMHG | DIASTOLIC BLOOD PRESSURE: 64 MMHG

## 2018-09-27 VITALS — SYSTOLIC BLOOD PRESSURE: 140 MMHG | DIASTOLIC BLOOD PRESSURE: 69 MMHG

## 2018-09-27 VITALS — SYSTOLIC BLOOD PRESSURE: 164 MMHG | DIASTOLIC BLOOD PRESSURE: 71 MMHG

## 2018-09-27 VITALS — SYSTOLIC BLOOD PRESSURE: 148 MMHG | DIASTOLIC BLOOD PRESSURE: 74 MMHG

## 2018-09-27 VITALS — DIASTOLIC BLOOD PRESSURE: 62 MMHG | SYSTOLIC BLOOD PRESSURE: 135 MMHG

## 2018-09-27 VITALS — DIASTOLIC BLOOD PRESSURE: 73 MMHG | SYSTOLIC BLOOD PRESSURE: 169 MMHG

## 2018-09-27 VITALS — DIASTOLIC BLOOD PRESSURE: 63 MMHG | SYSTOLIC BLOOD PRESSURE: 138 MMHG

## 2018-09-27 VITALS — DIASTOLIC BLOOD PRESSURE: 72 MMHG | SYSTOLIC BLOOD PRESSURE: 139 MMHG

## 2018-09-27 VITALS — DIASTOLIC BLOOD PRESSURE: 66 MMHG | SYSTOLIC BLOOD PRESSURE: 145 MMHG

## 2018-09-27 LAB
ALBUMIN SERPL-MCNC: 3.7 GM/DL (ref 3.2–4.5)
ALP SERPL-CCNC: 66 U/L (ref 40–136)
ALT SERPL-CCNC: 9 U/L (ref 0–55)
APTT PPP: YELLOW S
BACTERIA #/AREA URNS HPF: (no result) /HPF
BASOPHILS # BLD AUTO: 0.1 10^3/UL (ref 0–0.1)
BASOPHILS NFR BLD AUTO: 0 % (ref 0–10)
BILIRUB SERPL-MCNC: 0.5 MG/DL (ref 0.1–1)
BILIRUB UR QL STRIP: NEGATIVE
BUN/CREAT SERPL: 20
BUN/CREAT SERPL: 20
CALCIUM SERPL-MCNC: 9.2 MG/DL (ref 8.5–10.1)
CALCIUM SERPL-MCNC: 9.2 MG/DL (ref 8.5–10.1)
CHLORIDE SERPL-SCNC: 119 MMOL/L (ref 98–107)
CHLORIDE SERPL-SCNC: 119 MMOL/L (ref 98–107)
CO2 SERPL-SCNC: 20 MMOL/L (ref 21–32)
CO2 SERPL-SCNC: 20 MMOL/L (ref 21–32)
CREAT SERPL-MCNC: 0.94 MG/DL (ref 0.6–1.3)
CREAT SERPL-MCNC: 0.94 MG/DL (ref 0.6–1.3)
EOSINOPHIL # BLD AUTO: 0.1 10^3/UL (ref 0–0.3)
EOSINOPHIL NFR BLD AUTO: 1 % (ref 0–10)
ERYTHROCYTE [DISTWIDTH] IN BLOOD BY AUTOMATED COUNT: 14.8 % (ref 10–14.5)
FIBRINOGEN PPP-MCNC: CLEAR MG/DL
GFR SERPLBLD BASED ON 1.73 SQ M-ARVRAT: 59 ML/MIN
GFR SERPLBLD BASED ON 1.73 SQ M-ARVRAT: 59 ML/MIN
GLUCOSE SERPL-MCNC: 113 MG/DL (ref 70–105)
GLUCOSE SERPL-MCNC: 113 MG/DL (ref 70–105)
GLUCOSE UR STRIP-MCNC: NEGATIVE MG/DL
HCT VFR BLD CALC: 39 % (ref 35–52)
HGB BLD-MCNC: 12.1 G/DL (ref 11.5–16)
HYALINE CASTS #/AREA URNS LPF: (no result) /LPF
KETONES UR QL STRIP: NEGATIVE
LEUKOCYTE ESTERASE UR QL STRIP: (no result)
LYMPHOCYTES # BLD AUTO: 3.2 X 10^3 (ref 1–4)
LYMPHOCYTES NFR BLD AUTO: 25 % (ref 12–44)
MAGNESIUM SERPL-MCNC: 2.1 MG/DL (ref 1.8–2.4)
MANUAL DIFFERENTIAL PERFORMED BLD QL: NO
MCH RBC QN AUTO: 29 PG (ref 25–34)
MCHC RBC AUTO-ENTMCNC: 31 G/DL (ref 32–36)
MCV RBC AUTO: 94 FL (ref 80–99)
MONOCYTES # BLD AUTO: 1 X 10^3 (ref 0–1)
MONOCYTES NFR BLD AUTO: 8 % (ref 0–12)
NEUTROPHILS # BLD AUTO: 8.2 X 10^3 (ref 1.8–7.8)
NEUTROPHILS NFR BLD AUTO: 65 % (ref 42–75)
NITRITE UR QL STRIP: NEGATIVE
PH UR STRIP: 6 [PH] (ref 5–9)
PHOSPHATE SERPL-MCNC: 2.8 MG/DL (ref 2.3–4.7)
PLATELET # BLD: 340 10^3/UL (ref 130–400)
PMV BLD AUTO: 10 FL (ref 7.4–10.4)
POTASSIUM SERPL-SCNC: 3.8 MMOL/L (ref 3.6–5)
POTASSIUM SERPL-SCNC: 3.8 MMOL/L (ref 3.6–5)
PROT SERPL-MCNC: 6.8 GM/DL (ref 6.4–8.2)
PROT UR QL STRIP: NEGATIVE
RBC # BLD AUTO: 4.14 10^6/UL (ref 4.35–5.85)
RBC #/AREA URNS HPF: (no result) /HPF
RENAL EPI CELLS #/AREA URNS HPF: (no result) /HPF
SODIUM SERPL-SCNC: 148 MMOL/L (ref 135–145)
SODIUM SERPL-SCNC: 148 MMOL/L (ref 135–145)
SP GR UR STRIP: 1.01 (ref 1.02–1.02)
SQUAMOUS #/AREA URNS HPF: (no result) /HPF
UROBILINOGEN UR-MCNC: NORMAL MG/DL
WBC # BLD AUTO: 12.5 10^3/UL (ref 4.3–11)

## 2018-09-27 RX ADMIN — CLOPIDOGREL BISULFATE SCH MG: 75 TABLET, FILM COATED ORAL at 08:10

## 2018-09-27 RX ADMIN — INSULIN ASPART SCH UNIT: 100 INJECTION, SOLUTION INTRAVENOUS; SUBCUTANEOUS at 05:22

## 2018-09-27 RX ADMIN — INSULIN ASPART SCH UNIT: 100 INJECTION, SOLUTION INTRAVENOUS; SUBCUTANEOUS at 12:10

## 2018-09-27 RX ADMIN — SODIUM CHLORIDE SCH MLS/HR: 4.5 INJECTION, SOLUTION INTRAVENOUS at 05:22

## 2018-09-27 RX ADMIN — SODIUM CHLORIDE SCH MLS/HR: 4.5 INJECTION, SOLUTION INTRAVENOUS at 16:41

## 2018-09-27 RX ADMIN — INSULIN ASPART SCH UNIT: 100 INJECTION, SOLUTION INTRAVENOUS; SUBCUTANEOUS at 16:45

## 2018-09-27 RX ADMIN — ATORVASTATIN CALCIUM SCH MG: 40 TABLET, FILM COATED ORAL at 21:05

## 2018-09-27 RX ADMIN — SODIUM CHLORIDE SCH MLS/HR: 4.5 INJECTION, SOLUTION INTRAVENOUS at 08:11

## 2018-09-27 RX ADMIN — SODIUM CHLORIDE SCH MLS/HR: 4.5 INJECTION, SOLUTION INTRAVENOUS at 02:04

## 2018-09-27 RX ADMIN — FAMOTIDINE SCH MG: 20 TABLET, FILM COATED ORAL at 21:05

## 2018-09-27 RX ADMIN — INSULIN ASPART SCH UNIT: 100 INJECTION, SOLUTION INTRAVENOUS; SUBCUTANEOUS at 21:05

## 2018-09-27 NOTE — DIAGNOSTIC IMAGING REPORT
INDICATION: Dyspnea



COMPARISON: 09/26/2018



FINDINGS: Single frontal view of the chest demonstrates normal

heart size and pulmonary vascularity. The lungs are well aerated

and clear. No large pleural effusion or pneumothorax is seen. The

visualized osseous structures show no acute abnormalities.



IMPRESSION: 

1. No acute cardiopulmonary process.  



Dictated by: 



  Dictated on workstation # [id number]

## 2018-09-27 NOTE — PULMONARY CONSULTATION
History of Present Illness


History of Present Illness


Date of Consultation


18


 07:02


Time Seen by Provider:  07:03


Date of Admission





History of Present Illness


67yo with hx of Cdiff colitis (currently on PO Vancomycin) morbid obesity, DM 

from [facility] presented to ED after staffing found her to be very 

lethargic and not talking. PT was found to have a UTI, fever and dehydration in 

the ED. PT was admitted to ICU for close observation.





Allergies and Home Medications


Allergies


Coded Allergies:  


     morphine (Unverified  Allergy, Mild, 14)


 


 "ACTS WEIRD"


     Penicillins (Unverified  Allergy, Unknown, 08)





Home Medications


Albuterol Sulfate 18 Gm Hfa.aer.ad, 2 PUFF IH Q8H PRN for SHORTNESS OF BREATH, (

Reported)


Amlodipine Besylate 5 Mg Tablet, 5 MG PO DAILY, (Reported)


Atorvastatin Calcium 40 Mg Tablet, 40 MG PO HS, (Reported)


Clonazepam 0.5 Mg Tab.rapdis, 0.5 MG PO Q12H PRN for ANXIETY, (Reported)


Clopidogrel Bisulfate 75 Mg Tablet, 75 MG PO DAILY, (Reported)


Diphenhydramine HCl 25 Mg Capsule, 25 MG PO Q8H PRN for ITCHING, (Reported)


Docusate Sodium 100 Mg Capsule, 100 MG PO Q12H PRN for CONSTIPATION-1ST LINE, (

Reported)


Escitalopram Oxalate 20 Mg Tablet, 20 MG PO DAILY, (Reported)


Famotidine 20 Mg Tablet, 20 MG PO BID, (Reported)


Ferrous Sulfate 325 Mg Tablet, 325 MG PO DAILY, (Reported)


Magnesium Hydroxide 400 Mg/5 Ml Oral.susp, 30 ML PO Q12H PRN for CONSTIPATION-

7TH LINE, (Reported)


Magnesium Oxide 400 Mg Tablet, 400 MG PO BID, (Reported)


Menthol/Camphor 56 Gm Cream..g., TP Q6H PRN for JOINT PAIN, (Reported)


Mirtazapine 15 Mg Tablet, 15 MG PO HS, (Reported)


Multivits,Stress Formula/Zinc 1 Each Tablet, 1 TAB PO DAILY, (Reported)


Nut.tx.impaired Digest Fxn 200 Ml Liquid, 200 ML PO TID, (Reported)


Nystatin/Triamcinolone 15 Gm Cr, TP TID, (Reported)


Ondansetron HCl 4 Mg Tab, 4 MG PO Q6H PRN for NAUSEA/VOMITING-1ST LINE, (

Reported)


Potassium Chloride 10 Meq Tablet.er, 10 MEQ PO BID, (Reported)


Protein Supplement 946 Ml Liquid, 30 ML PO DAILY, (Reported)


Tramadol HCl 50 Mg Tablet, 50 MG PO Q12H PRN for PAIN-MODERATE, (Reported)


Vancomycin HCl 125 Mg/2.5 Ml Syringe, 125 MG PO Q6H


   Prescribed by: DEMI ROWE on 17 1127


[Barrier Cream]  , TOP BID, (Reported)


   APPLY TO BILATERAL BUTTOCKS FOR PREVENTATIVE MEASURES 





Past Medical-Social-Family Hx


Patient Social History


Alcohol Use:  Denies Use


Recreational Drug Use:  No


Smoking Status:  Never a Smoker


Type Used:  Cigarettes


2nd Hand Smoke Exposure:  No


Recent Foreign Travel:  No


Contact w/Someone Who Travel:  No


Recent Infectious Disease Expo:  No


Recent Hopitalizations:  No


Physical Abuse:  No


Sexual Abuse:  No





Immunizations Up To Date


Tetanus Booster (TDap):  Unknown


Date of Pneumonia Vaccine:  Oct 17, 2016





Seasonal Allergies


Seasonal Allergies:  No





Past Medical History


Surgeries:  Yes (CATARACTS)


 Section, Eye Surgery, Gallbladder


Respiratory:  Yes


COPD


Cardiac:  Yes


Coronary Artery Disease, High Cholesterol, Hypertension


Neurological:  Yes


Stroke


Reproductive Disorders:  No


Female Reproductive Disorders:  Denies


GYN History:  Menopausal


Sexually Transmitted Disease:  No


HIV/AIDS:  No


Genitourinary:  No


Gastrointestinal:  Yes


Chronic Constipation


Musculoskeletal:  Yes (CHRONIC NECK AND BACK PAIN ; UNSTEADY GAIT AND 

GENERALIZED WEAKNESS)


Arthritis, Chronic Back Pain


Endocrine:  Yes


Diabetes, Non-Insulin dep


HEENT:  No


Cancer:  No


Psychosocial:  Yes


Depression


Integumentary:  No


Blood Disorders:  No


Adverse Reaction/Blood Tranf:  No





Family Medical History





Patient reports no known family medical history.


No Pertinent Family Hx





Sepsis Event


Evaluation


Height, Weight, BMI


Height: 5'6.00"


Weight: 201lbs. 0.0oz. 91.940653ia; 32.4 BMI


Method:Actual





Exam


Exam





Vital Signs








  Date Time  Temp Pulse Resp B/P (MAP) Pulse Ox O2 Delivery O2 Flow Rate FiO2


 


18 06:00  62 14 145/69 (94) 95 Nasal Cannula 2.00 


 


18 05:00  74 8 140/69 (92) 94 Nasal Cannula 2.00 


 


18 04:00     95 Nasal Cannula 2.00 


 


18 04:00  75 13 145/66 (92) 95 Nasal Cannula 2.00 


 


18 03:00  74 14 146/69 (94) 96 Nasal Cannula 2.00 


 


18 02:00  70 13 131/58 (82) 96 Nasal Cannula 2.00 


 


18 01:29  79      


 


18 01:00  75 14 138/63 (88) 96 Nasal Cannula 2.00 


 


18 00:01  76 13 157/62 (93) 96 Nasal Cannula 2.00 


 


18 00:00     96 Nasal Cannula 2.00 


 


18 00:00 97.0       


 


18 00:00     95 Nasal Cannula 2.00 


 


18 23:00  76 13 121/62 (81) 96 Nasal Cannula 2.00 


 


18 22:00    139/63 (88)  Nasal Cannula 2.00 


 


18 21:00  81 9 150/74 (99) 98 Nasal Cannula 2.00 


 


18 21:00     96 Nasal Cannula 2.00 


 


18 20:30  79      


 


18 20:15     96 Nasal Cannula 2.00 


 


18 19:55 98.0 84 18 124/75 (91) 94 Nasal Cannula 2.00 


 


18 19:41 100.0 87 16 137/71 92   


 


18 16:30     93 Nasal Cannula 2.00 


 


18 16:15 100.1 104 20 128/58 (81)    














I & O 


 


 18





 07:00


 


Intake Total 50 ml


 


Output Total 900 ml


 


Balance -850 ml








Height & Weight


Height: 5'6.00"


Weight: 201lbs. 0.0oz. 91.405816iz; 32.4 BMI


Method:Actual


General Appearance:  Moderate Distress, Obese


HEENT:  PERRL/EOMI, TMs Normal, Normal ENT Inspection; No Pharynx Normal (

oropharynx is exceptionally dry), No Moist Mucous Membranes


Neck:  Full Range of Motion, Supple


Respiratory:  Chest Non Tender, Lungs Clear, Normal Breath Sounds, No Accessory 

Muscle Use, No Respiratory Distress


Cardiovascular:  Regular Rate, Rhythm, No Edema, Normal Peripheral Pulses


Capillary Refill:  Less Than 3 Seconds


Extremity:  Non Tender, No Calf Tenderness, No Pedal Edema, Other (there is a 

unstageable heel pressure ulcer on the left foot and a padded heel protector 

around the right foot with no obvious pressure ulcer. There is a unstageable 

sacral pressure ulcer)


Neurologic/Psychiatric:  Alert, Other (oriented to person and place, lethargic)





Results


Lab


Laboratory Tests


18 16:20








18 03:21











Assessment/Plan


Assessment/Plan


 UTI 


   -IVF 


   -Continue Rocephin 


   -Pan cultures pending 


Dehydration with hypernatremia 


   -IVF- currently on /2 NS 


Lethargy/metabolic encephalopathy


Morbid obesity











ATA SHINE DO Sep 27, 2018 07:08

## 2018-09-27 NOTE — DIAGNOSTIC IMAGING REPORT
PROCEDURE: CT head without contrast.



TECHNIQUE: Multiple contiguous axial images were obtained through

the brain without the use of intravenous contrast.



INDICATION: Altered mental status in patient with fall and head

injury on 07/02/2018.



COMPARISON: Comparison is made to previous study dated

07/02/2018.



FINDINGS: Ventricles and sulci remain diffusely prominent. There

is no evidence of intracranial hemorrhage. No territorial infarct

is identified. There are areas of lacunar infarct involving the

chin to the left of midline and within the right basal ganglia.

Calvarium is intact and the visualized paranasal sinuses are

clear.



IMPRESSION: Advanced senescent findings in the brain without CT

evidence of acute intracranial abnormality.



Dictated by: 



  Dictated on workstation # [id number]

## 2018-09-27 NOTE — OCCUPATIONAL THERAPY EVAL
OT Evaluation-General/PLF


Medical Diagnosis


Admission Date


Sep 26, 2018 at 18:50


Medical Diagnosis:  AMS, UTI


Onset Date:  Sep 23, 2018





Therapy Diagnosis


Therapy Diagnosis:  Weakness





Height/Weight


Height (Feet):  5


Height (Inches):  6.00


Weight (Pounds):  201


Weight (Ounces):  0.0





Precautions


Precautions/Isolations:  Fall Prevention, Standard Precautions, Pressure Ulcer


Safety Interventions:  Bed Exit Alarm, Reorient-PRN





Weight Bear Status


Weight Bearing Restriction:  Weight Bearing/Tolerated





Referral


Physician:  Dr. Covington


Referral Reason:  Activity Tolerance, Self Care, Evaluation/Treatment, 

Strengthening/ROM





Medical History


Pertinent Medical History:  Arthritis, CAD, COPD, CVA, DM, HTN, Renal 

Insufficiency, Smoking


Additional Medical History


Pt. resident in NH according to records.  Pt. is unable to state where she 

lives.  Came to ER with AMS and UTI.


Current History


Pt. is unable to verbalize her history.  Pt. has brain tumor.


Reviewed History:  Yes





Social History


Home:  Nursing Home


Current Living Status:  


Entry Into Home:  Level Entry





ADL-Prior Level of Function


ADL PLOF Comments


Pt. is unable to state her prior level of function.  Unable to state whether or 

not she uses a walker.





OT Current Status


Subjective


No pain reported.





Appearance


Pt. in bed.  Agrees to work with OT but lethargic.  Declines attempting to sit 

on side of bed.





Mental Status/Objective


Patient Orientation:  Confused, Mumbles


Attachments:  Butler Catheter, IV





ADL-Treatment


              Functional [place] Measure


0=Not Assessed/NA   4=Minimal Assistance


1=Total Assistance   5=Supervision or Setup


2=Maximal Assistance   6=Modified [place]


3=Moderate Assistance   7=Complete IndependenceIRFPAI Quality Coding Scale











6 Independent with activity with or without an assistive device


 


5  Patient requires set up or clean up by helper.  Patient completes activity  

by  themselves


 


4 Supervision or touching assist (CGA). [place] provide cues , steadying assist


 


3 The helper provides less than half the effort to complete the activity


 


2 The helper provides more than half the effort to complete the activity


 


1 Dependent.  The helper does all the effort to complete an activity 


 


7 Patient refused to complete or attempt activity


 


9 The patient did not perform the activity before the current illness or injury


 


88 Not attempted due to Medical conditions or safety concerns











Other Treatments


Pt. moves very minimally.  Pt. agrees to wash face, brush hair, and brush 

teeth.  Pt. is encouraged to attempt movements and tasks for herself.  Pt. is 

able to put the toothbrush in her mouth, but only touches tongue and moves 

brush slightly.  OT offers multiple times to assist with brushing teeth.  Pt. 

declines and becomes slightly agitated at offer.  Pt. does allow OT to brush 

her hair, and eventually allows her to wash face after pt. "dabs" at face for 

quite awhile.  Pt. states, "I guess you are in too big a hurry."  Pt. 

demonstrates very limited UE movement.  Pt. confused and unable to report 

history.  Pt. unable to follow cues and very lethargic.  Positioned pt. to 

comfort with no assist from pt.  All needs met in room.





Education


OT Patient Education:  Correct positioning, Modified ADL techniques, Progress 

toward Goal/Update tx plan, Purpose of tx/functional activities, Reviewed 

precautions, Rehab process, Transfer techniques


Teaching Recipient:  Patient


Teaching Methods:  Demonstration


Response to Teaching:  Verbalize Understanding, Return Demonstration





OT Short Term Goals


Short Term Goals


Time Frame:  Oct 11, 2018


Eating(FIM):  4


Grooming(FIM):  4


Bathing(FIM):  3


Upper Body Dressing(FIM):  4


Lower Body Dressing(FIM):  3


Toileting(FIM):  3


Transfers (B,C,W/C) (FIM):  3


Toilet/Commode Transfer(FIM):  3


Additional Short Term Goals:  1-Demonstrate ADL Tasks, 2-Verbalize Understanding

, 3-ImproveStrength/Rica


1=Demonstrate adherence to instructed precautions during ADL tasks.


2=Patient will verbalize/demonstrate understanding of assistive devices/

modifications for ADL.


3=Patient will improve strength/tolerance for activity to enable patient to 

perform ADL's.





OT Long Term Goals


Long Term Goals


Time Frame:  Oct 25, 2018


Eating (FIM):  5


Grooming(FIM):  5


Bathing(FIM):  4


Upper Body Dressing(FIM):  5


Lower Body Dressing(FIM):  4


Toileting(FIM):  5


Transfers (B,C,W/C) (FIM):  5


Toilet/Commode Transfer(FIM):  5


Shower Transfer(FIM):  4


Additional Goals:  1-Demonstrate ADL Tasks, 2-Verbalize Understanding, 3-

ImproveStrength/Rica


1=Demonstrate adherence to instructed precautions during ADL tasks.


2=Patient will verbalize/demonstrate understanding of assistive devices/

modifications for ADL.


3=Patient will improve strength/tolerance for activity to enable patient to 

perform ADL's.





OT Education/Plan


Problem List/Assessment


Assessment:  Decreased Activ Tolerance, Decreased Safety Aware, Decreased UE 

Strength, Dependent Transfers, Impaired Bed Mobility, Impaired Cognition, 

Impaired Coordination, Impaired Funct Balance, Impaired I ADL's, Impaired Self-

Care Skills, Restricted Funct UE ROM





Discharge Recommendations


Plan/Recommendations:  Continue POC


Therapy D/C Recommendations:  24 hr Supervision





Treatment Plan/Plan of Care


Treatment,Training & Education:  Yes


Patient would benefit from OT for education, treatment and training to promote 

independence in ADL's, mobility, safety and/or upper extremity function for ADL'

s.


Plan of Care:  ADL Retraining, Cognitive Retraining, Functional Mobility, UE 

Funct Exercise/Act


Treatment Duration:  Oct 25, 2018


Frequency:  5 times per week


Estimated Hrs Per Day:  .25 hour per day


Agreement:  Yes


Rehab Potential:  Fair





Time/GCodes


Start Time:  08:35


Stop Time:  09:05


Total Time Billed (hr/min):  30


Billed Treatment Time


1, EVH x 15minutes, ADL x 15minutes











BLU ESPINOZA OT Sep 27, 2018 13:11

## 2018-09-27 NOTE — HISTORY & PHYSICIAL
History of Present Illness


History of Present Illness


Reason for visit/HPI


Patient is a resident of  nursing home.


Patient has been lethargic the last few days and somnolent.


Patient not able to speak in the emergency room.


Patient running an elevated temperature of 101.


Patient has a history of brain tumor.


Patient seen by neurologist and stated that the brain tumor was getting bigger 

by MRI.


Patient has altered mental status this morning.


Patient can mention her name but not able to give any history or talk.


Patient has renal insufficiency.


UA shows white blood cells needs to have a culture done.


Patient lethargic.


Patient not talking much.


Patient is a known diabetic.


Patient has coronary artery disease.


Patient has history of hypertension


Date of Admission


Sep 26, 2018 at 18:50


Time Seen by a Provider:  07:47


I consulted on this patient on


18


 07:47


Attending Physician


Margarito Moser DO


Admitting Physician


Margarito Moser DO


Consult








Allergies and Home Medications


Allergies


Coded Allergies:  


     morphine (Unverified  Allergy, Mild, 14)


 


 "ACTS WEIRD"


     Penicillins (Unverified  Allergy, Unknown, 08)





Home Medications


Albuterol Sulfate 18 Gm Hfa.aer.ad, 2 PUFF IH Q8H PRN for SHORTNESS OF BREATH, (

Reported)


Amlodipine Besylate 5 Mg Tablet, 5 MG PO DAILY, (Reported)


Atorvastatin Calcium 40 Mg Tablet, 40 MG PO HS, (Reported)


Clonazepam 0.5 Mg Tab.rapdis, 0.5 MG PO Q12H PRN for ANXIETY, (Reported)


Clopidogrel Bisulfate 75 Mg Tablet, 75 MG PO DAILY, (Reported)


Diphenhydramine HCl 25 Mg Capsule, 25 MG PO Q8H PRN for ITCHING, (Reported)


Docusate Sodium 100 Mg Capsule, 100 MG PO Q12H PRN for CONSTIPATION-1ST LINE, (

Reported)


Escitalopram Oxalate 20 Mg Tablet, 20 MG PO DAILY, (Reported)


Famotidine 20 Mg Tablet, 20 MG PO BID, (Reported)


Ferrous Sulfate 325 Mg Tablet, 325 MG PO DAILY, (Reported)


Magnesium Hydroxide 400 Mg/5 Ml Oral.susp, 30 ML PO Q12H PRN for CONSTIPATION-

7TH LINE, (Reported)


Magnesium Oxide 400 Mg Tablet, 400 MG PO BID, (Reported)


Menthol/Camphor 56 Gm Cream..g., TP Q6H PRN for JOINT PAIN, (Reported)


Mirtazapine 15 Mg Tablet, 15 MG PO HS, (Reported)


Multivits,Stress Formula/Zinc 1 Each Tablet, 1 TAB PO DAILY, (Reported)


Nut.tx.impaired Digest Fxn 200 Ml Liquid, 200 ML PO TID, (Reported)


Nystatin/Triamcinolone 15 Gm Cr, TP TID, (Reported)


Ondansetron HCl 4 Mg Tab, 4 MG PO Q6H PRN for NAUSEA/VOMITING-1ST LINE, (

Reported)


Potassium Chloride 10 Meq Tablet.er, 10 MEQ PO BID, (Reported)


Protein Supplement 946 Ml Liquid, 30 ML PO DAILY, (Reported)


Tramadol HCl 50 Mg Tablet, 50 MG PO Q12H PRN for PAIN-MODERATE, (Reported)


Vancomycin HCl 125 Mg/2.5 Ml Syringe, 125 MG PO Q6H


   Prescribed by: DEMI ROWE on 17 1127


[Barrier Cream]  , TOP BID, (Reported)


   APPLY TO BILATERAL BUTTOCKS FOR PREVENTATIVE MEASURES 





Patient Home Medication List


Home Medication List Reviewed:  No





Past Medical-Social-Family Hx


Patient Social History


Marrital Status:  


Employed/Student:  retired


Alcohol Use:  Denies Use


Recreational Drug Use:  No


Smoking Status:  Never a Smoker


Type Used:  Cigarettes


2nd Hand Smoke Exposure:  No


Physical Abuse Screen:  No


Sexual Abuse:  No


Recent Foreign Travel:  No


Contact w/other who traveled:  No


Recent Hopitalizations:  No


Recent Infectious Disease Expo:  No





Immunizations Up To Date


Tetanus Booster (TDap):  Unknown


Date of Pneumonia Vaccine:  Oct 17, 2016





Seasonal Allergies


Seasonal Allergies:  No





Surgeries


Yes (CATARACTS)


 Section, Eye Surgery, Gallbladder





Respiratory


Yes


COPD





Cardiovascular


Yes


Coronary Artery Disease, High Cholesterol, Hypertension





Neurological


Yes


Brain Tumor, Stroke





Reproductive System


Hx Reproductive Disorders:  No


Sexually Transmitted Disease:  No


HIV/AIDS:  No


Female Reproductive Disorders:  Denies


GYN History:  Menopausal





Genitourinary


No





Gastrointestinal


Yes


Chronic Constipation





Musculoskeletal


Yes (CHRONIC NECK AND BACK PAIN ; UNSTEADY GAIT AND GENERALIZED WEAKNESS)


Arthritis, Chronic Back Pain





Endocrine


History of Endocrine Disorders:  Yes


Endocrine Disorders:  Diabetes, Non-Insulin dep





HEENT


History of HEENT Disorders:  No





Cancer


No





Psychosocial


History of Psychiatric Problem:  Yes


Behavioral Health Disorders:  Depression





Integumentary


History of Skin or Integumenta:  No





Blood Transfusions


History of Blood Disorders:  No


Adverse Reaction to a Blood Tr:  No





Family Medical History


Significant Family History:  No Pertinent Family Hx


Family Hx:  


Patient reports no known family medical history.





Review of Systems


Constitutional:  fever, malaise, weakness


EENTM:  no symptoms reported


Respiratory:  no symptoms reported


Cardiovascular:  no symptoms reported


Gastrointestinal:  no symptoms reported


Genitourinary:  other (UTI)


Pregnant:  No





Physical Exam


Vital Signs





Vital Signs - First Documented








 18





 16:15 16:30


 


Temp 100.1 


 


Pulse 104 


 


Resp 20 


 


B/P (MAP) 128/58 (81) 


 


Pulse Ox  93


 


O2 Delivery  Nasal Cannula


 


O2 Flow Rate  2.00





Capillary Refill : Less Than 3 Seconds


Height, Weight, BMI


Height: 5'6.00"


Weight: 201lbs. 0.0oz. 91.300209ms; 32.4 BMI


Method:Actual


General Appearance:  WD/WN, Other (Lethargic, not communicating much, weakness)


Eyes:  Bilateral Eye Normal Inspection


HEENT:  Normal ENT Inspection, Other (Mouth dry)


Neck:  Normal Inspection, Non Tender


Respiratory:  Lungs Clear, No Accessory Muscle Use, No Respiratory Distress


Cardiovascular:  Regular Rate, Rhythm, No Murmur


Gastrointestinal:  Non Tender, Soft





Assessment/Plan


Assessment and Plan


Altered mental status.


Lethargic.


Brain tumor.


UTI.


Coronary artery disease.


Hypertension.


Diabetes.


Nonverbal





Admission Diagnosis


Admission Status:  Inpatient Order (span 2 midnights)


Reason for Inpatient Admission:  


Altered mental status.


UTI.


Febrile.


Brain tumor.


Diabetes.


Coronary artery disease.


Hypertension history.


Confusion today.


Nonverbal new-onset





Clinical Quality Measures


DVT/VTE Risk/Contraindication:


Risk Factor Score Per Nursin


RFS Level Per Nursing on Admit:  4+=Very High











MARGARITO MOSER DO Sep 27, 2018 07:55

## 2018-09-28 VITALS — SYSTOLIC BLOOD PRESSURE: 156 MMHG | DIASTOLIC BLOOD PRESSURE: 71 MMHG

## 2018-09-28 VITALS — SYSTOLIC BLOOD PRESSURE: 136 MMHG | DIASTOLIC BLOOD PRESSURE: 63 MMHG

## 2018-09-28 VITALS — DIASTOLIC BLOOD PRESSURE: 69 MMHG | SYSTOLIC BLOOD PRESSURE: 149 MMHG

## 2018-09-28 VITALS — SYSTOLIC BLOOD PRESSURE: 128 MMHG | DIASTOLIC BLOOD PRESSURE: 64 MMHG

## 2018-09-28 VITALS — SYSTOLIC BLOOD PRESSURE: 138 MMHG | DIASTOLIC BLOOD PRESSURE: 63 MMHG

## 2018-09-28 VITALS — SYSTOLIC BLOOD PRESSURE: 127 MMHG | DIASTOLIC BLOOD PRESSURE: 57 MMHG

## 2018-09-28 LAB
BASOPHILS # BLD AUTO: 0 10^3/UL (ref 0–0.1)
BASOPHILS NFR BLD AUTO: 0 % (ref 0–10)
BUN/CREAT SERPL: 13
CALCIUM SERPL-MCNC: 8.8 MG/DL (ref 8.5–10.1)
CHLORIDE SERPL-SCNC: 110 MMOL/L (ref 98–107)
CO2 SERPL-SCNC: 20 MMOL/L (ref 21–32)
CREAT SERPL-MCNC: 0.78 MG/DL (ref 0.6–1.3)
EOSINOPHIL # BLD AUTO: 0.2 10^3/UL (ref 0–0.3)
EOSINOPHIL NFR BLD AUTO: 2 % (ref 0–10)
ERYTHROCYTE [DISTWIDTH] IN BLOOD BY AUTOMATED COUNT: 13.6 % (ref 10–14.5)
GFR SERPLBLD BASED ON 1.73 SQ M-ARVRAT: > 60 ML/MIN
GLUCOSE SERPL-MCNC: 71 MG/DL (ref 70–105)
HCT VFR BLD CALC: 34 % (ref 35–52)
HGB BLD-MCNC: 11.3 G/DL (ref 11.5–16)
LYMPHOCYTES # BLD AUTO: 2.2 X 10^3 (ref 1–4)
LYMPHOCYTES NFR BLD AUTO: 22 % (ref 12–44)
MAGNESIUM SERPL-MCNC: 1.7 MG/DL (ref 1.8–2.4)
MANUAL DIFFERENTIAL PERFORMED BLD QL: NO
MCH RBC QN AUTO: 30 PG (ref 25–34)
MCHC RBC AUTO-ENTMCNC: 33 G/DL (ref 32–36)
MCV RBC AUTO: 91 FL (ref 80–99)
MONOCYTES # BLD AUTO: 0.6 X 10^3 (ref 0–1)
MONOCYTES NFR BLD AUTO: 6 % (ref 0–12)
NEUTROPHILS # BLD AUTO: 6.9 X 10^3 (ref 1.8–7.8)
NEUTROPHILS NFR BLD AUTO: 70 % (ref 42–75)
PHOSPHATE SERPL-MCNC: 2.8 MG/DL (ref 2.3–4.7)
PLATELET # BLD: 251 10^3/UL (ref 130–400)
PMV BLD AUTO: 10.4 FL (ref 7.4–10.4)
POTASSIUM SERPL-SCNC: 3.3 MMOL/L (ref 3.6–5)
RBC # BLD AUTO: 3.72 10^6/UL (ref 4.35–5.85)
SODIUM SERPL-SCNC: 137 MMOL/L (ref 135–145)
WBC # BLD AUTO: 9.8 10^3/UL (ref 4.3–11)

## 2018-09-28 RX ADMIN — DEXAMETHASONE SODIUM PHOSPHATE SCH MG: 4 INJECTION, SOLUTION INTRAMUSCULAR; INTRAVENOUS at 09:55

## 2018-09-28 RX ADMIN — SODIUM CHLORIDE SCH MLS/HR: 4.5 INJECTION, SOLUTION INTRAVENOUS at 05:24

## 2018-09-28 RX ADMIN — CLOPIDOGREL BISULFATE SCH MG: 75 TABLET, FILM COATED ORAL at 09:32

## 2018-09-28 RX ADMIN — INSULIN ASPART SCH UNIT: 100 INJECTION, SOLUTION INTRAVENOUS; SUBCUTANEOUS at 11:52

## 2018-09-28 RX ADMIN — INSULIN ASPART SCH UNIT: 100 INJECTION, SOLUTION INTRAVENOUS; SUBCUTANEOUS at 21:23

## 2018-09-28 RX ADMIN — SODIUM CHLORIDE SCH MLS/HR: 4.5 INJECTION, SOLUTION INTRAVENOUS at 20:20

## 2018-09-28 RX ADMIN — ATORVASTATIN CALCIUM SCH MG: 40 TABLET, FILM COATED ORAL at 21:23

## 2018-09-28 RX ADMIN — FAMOTIDINE SCH MG: 20 TABLET, FILM COATED ORAL at 21:22

## 2018-09-28 RX ADMIN — INSULIN ASPART SCH UNIT: 100 INJECTION, SOLUTION INTRAVENOUS; SUBCUTANEOUS at 17:36

## 2018-09-28 RX ADMIN — INSULIN ASPART SCH UNIT: 100 INJECTION, SOLUTION INTRAVENOUS; SUBCUTANEOUS at 05:25

## 2018-09-28 NOTE — PROGRESS NOTE (SOAP)
Subjective


Time Seen by a Provider:  06:45


Subjective/Events-last exam


Patient is confused this morning.


Patient feel she is in my office.


Patient has a stare.


Patient seen cats and ducks in her room.


Altered mental status.


Blood tests not on chart yet





Focused Exam


Lactate Level


18 16:20: Lactic Acid Level 1.32





Objective


Exam





Vital Signs








  Date Time  Temp Pulse Resp B/P (MAP) Pulse Ox O2 Delivery O2 Flow Rate FiO2


 


18 04:00 98.0 63 16 127/57 (80) 95 Room Air  


 


18 01:00  67      


 


18 00:00 98.2 71 18 156/71 (99) 95 Room Air  


 


18 20:58 98.6 67 18 164/71 (102) 96 Room Air  


 


18 19:00  68      


 


18 15:32 98.4 66 18 169/73 (105) 96 Room Air  


 


18 13:00  69      


 


18 12:00      Nasal Cannula 0.50 


 


18 12:00      Nasal Cannula 0.50 


 


18 12:00  67  133/62 (85) 95 Nasal Cannula 2.00 


 


18 11:00  76  139/72 (94) 94 Nasal Cannula 2.00 


 


18 10:00  68 11 135/62 (86) 95 Nasal Cannula 2.00 


 


18 09:00  75 14 143/64 (90) 95 Nasal Cannula 2.00 


 


18 08:15      Nasal Cannula 0.50 


 


18 08:15      Nasal Cannula 0.50 


 


18 08:00  68 12 121/87 (98) 95 Nasal Cannula 2.00 


 


18 07:52      Nasal Cannula 2.00 


 


18 07:00  61 13 148/74 (98) 92 Nasal Cannula 2.00 


 


18 07:00  61      














I & O 


 


 18





 07:00


 


Intake Total 4250 ml


 


Output Total 2025 ml


 


Balance 2225 ml





Capillary Refill : Less Than 3 Seconds


General Appearance:  No Apparent Distress, WD/WN


HEENT:  Normal ENT Inspection


Neck:  Full Range of Motion, Normal Inspection


Respiratory:  Lungs Clear, No Accessory Muscle Use, No Respiratory Distress


Cardiovascular:  Regular Rate, Rhythm, No Murmur


Gastrointestinal:  non tender, soft





Results


Lab


Laboratory Tests


18 06:25








Laboratory Tests


18 09:46: 


Urine Color YELLOW, Urine Clarity CLEAR, Urine pH 6, Urine Specific Gravity 

1.010L, Urine Protein NEGATIVE, Urine Glucose (UA) NEGATIVE, Urine Ketones 

NEGATIVE, Urine Nitrite NEGATIVE, Urine Bilirubin NEGATIVE, Urine Urobilinogen 

NORMAL, Urine Leukocyte Esterase 2+H, Urine RBC (Auto) NEGATIVE, Urine RBC RARE

, Urine WBC 10-25H, Urine Squamous Epithelial Cells 2-5, Urine Renal Epithelial 

Cells NONE, Urine Crystals NONE, Urine Bacteria TRACE, Urine Casts PRESENT, 

Urine Hyaline Casts 2-5H, Urine Mucus SMALLH, Urine Culture Indicated YES


18 11:52: Glucometer 89


18 16:27: Glucometer 84


18 21:03: Glucometer 114H


18 05:20: Glucometer 75


18 06:25: 





Microbiology


18 Blood Culture - Preliminary, Resulted


          No growth


18 Urine Culture - Preliminary, Resulted


          Sent To [facility]





Assessment/Plan


Assessment/Plan


Assess & Plan/Chief Complaint


Acute mental status change.


Renal insufficiency..


Patient verbal this morning but confused.


Lethargic.


Brain tumor.


UTI.


Coronary artery disease.


Hypertension.


Diabetes





Clinical Quality Measures


Admission Status


Admission Dx


Altered mental status.


Lethargic.


Brain tumor.


UTI.


Coronary artery disease.


Hypertension.


Diabetes.


Nonverbal





DVT/VTE Risk/Contraindication:


Risk Factor Score Per Nursin


RFS Level Per Nursing on Admit:  4+=Very High


Contraindications-Pharm:  Other *list below*











RODY MOSER DO Sep 28, 2018 06:49

## 2018-09-28 NOTE — OCCUPATIONAL THER DAILY NOTE
OT Current Status-Daily Note


Subjective


Pt. does not report pain.





Appearance


Pt. in bed with tray in front of her.





Mental Status/Objective


Patient Orientation:  Confused


              Functional [place] Measure


0=Not Assessed/NA   4=Minimal Assistance


1=Total Assistance   5=Supervision or Setup


2=Maximal Assistance   6=Modified [place]


3=Moderate Assistance   7=Complete [place]





Other Treatment


Pt. in bed with HOB elevated and tray in front.  Pt. has eaten half of her food 

but it is also all over her and tray.  Pt. states that she is done when asked, 

but attempts to take her tea.  Unable to pick tea up and so OT has to hold it 

for her so she can drink.  Pt. very confused.  Unable to answer OT's questions, 

and states that OT was with her all night.  Does not believe OT that she was 

not.  OT gives pt. washcloth to wash hands.  Pt. dabs at hands but barely 

cleans them.  Pt. does not know where she is.  OT attempts to re-position pt. 

in bed but pt. states, "stop."  States that she is comfortable when asked, and 

wants to stay how she is.  OT provides blanket and call light.  Spoke with 

nursing who states that pt. was re-positioned.  Pt. left in current position.  

All needs met.





Education


OT Patient Education:  Correct positioning, Modified ADL techniques, Progress 

toward Goal/Update tx plan, Purpose of tx/functional activities, Reviewed 

precautions, Rehab process


Teaching Recipient:  Patient


Teaching Methods:  Demonstration, Discussion


Response to Teaching:  Unable to Return Demonstration





OT Short Term Goals


Short Term Goals


Time Frame:  Oct 11, 2018


Eating(FIM):  4


Grooming(FIM):  4


Bathing(FIM):  3


Upper Body Dressing(FIM):  4


Lower Body Dressing(FIM):  3


Toileting(FIM):  3


Transfers (B,C,W/C) (FIM):  3


Toilet/Commode Transfer(FIM):  3


Additional Short Term Goals:  1-Demonstrate ADL Tasks, 2-Verbalize Understanding

, 3-ImproveStrength/Rica


1=Demonstrate adherence to instructed precautions during ADL tasks.


2=Patient will verbalize/demonstrate understanding of assistive devices/

modifications for ADL.


3=Patient will improve strength/tolerance for activity to enable patient to 

perform ADL's.





OT Long Term Goals


Long Term Goals


Time Frame:  Oct 25, 2018


Eating (FIM):  5


Grooming(FIM):  5


Bathing(FIM):  4


Upper Body Dressing(FIM):  5


Lower Body Dressing(FIM):  4


Toileting(FIM):  5


Transfers (B,C,W/C) (FIM):  5


Toilet/Commode Transfer(FIM):  5


Shower Transfer(FIM):  4


Additional Goals:  1-Demonstrate ADL Tasks, 2-Verbalize Understanding, 3-

ImproveStrength/Rica


1=Demonstrate adherence to instructed precautions during ADL tasks.


2=Patient will verbalize/demonstrate understanding of assistive devices/

modifications for ADL.


3=Patient will improve strength/tolerance for activity to enable patient to 

perform ADL's.





OT Education/Plan


Problem List/Assessment


Assessment:  Decreased Activ Tolerance, Decreased Safety Aware, Decreased UE 

Strength, Dependent Transfers, Impaired Bed Mobility, Impaired Cognition, 

Impaired Coordination, Impaired Funct Balance, Impaired I ADL's, Impaired Self-

Care Skills, Restricted Funct UE ROM





Discharge Recommendations


Plan/Recommendations:  Continue POC


Therapy D/C Recommendations:  24 hr Supervision





Treatment Plan/Plan of Care


Treatment,Training & Education:  Yes


Patient would benefit from OT for education, treatment and training to promote 

independence in ADL's, mobility, safety and/or upper extremity function for ADL'

s.


Plan of Care:  ADL Retraining, Cognitive Retraining, Functional Mobility, UE 

Funct Exercise/Act


Treatment Duration:  Oct 25, 2018


Frequency:  5 times per week


Estimated Hrs Per Day:  .25 hour per day


Agreement:  Yes


Rehab Potential:  Poor





Time/GCodes


Start Time:  12:15


Stop Time:  12:30


Total Time Billed (hr/min):  15


Billed Treatment Time


1, ADL











BLU ESPINOZA OT Sep 28, 2018 13:42

## 2018-09-28 NOTE — PULMONARY PROGRESS NOTE
Subjective


Time Seen by a Provider:  08:18


Subjective/Events-last exam


No respiratory complications noted.





Sepsis Event


Evaluation


Height, Weight, BMI


Height: 5'6.00"


Weight: 207lbs. 9.0oz. 94.813473qq; 32.4 BMI


Method:Actual





Focused Exam


Lactate Level


9/26/18 16:20: Lactic Acid Level 1.32





Exam


Exam





Vital Signs








  Date Time  Temp Pulse Resp B/P (MAP) Pulse Ox O2 Delivery O2 Flow Rate FiO2


 


9/28/18 07:00  71      


 


9/28/18 04:00 98.0 63 16 127/57 (80) 95 Room Air  


 


9/28/18 01:00  67      


 


9/28/18 00:00 98.2 71 18 156/71 (99) 95 Room Air  


 


9/27/18 20:58 98.6 67 18 164/71 (102) 96 Room Air  


 


9/27/18 19:00  68      


 


9/27/18 15:32 98.4 66 18 169/73 (105) 96 Room Air  


 


9/27/18 13:00  69      


 


9/27/18 12:00      Nasal Cannula 0.50 


 


9/27/18 12:00      Nasal Cannula 0.50 


 


9/27/18 12:00  67  133/62 (85) 95 Nasal Cannula 2.00 


 


9/27/18 11:00  76  139/72 (94) 94 Nasal Cannula 2.00 


 


9/27/18 10:00  68 11 135/62 (86) 95 Nasal Cannula 2.00 


 


9/27/18 09:00  75 14 143/64 (90) 95 Nasal Cannula 2.00 














I & O 


 


 9/28/18





 07:00


 


Intake Total 4250 ml


 


Output Total 2025 ml


 


Balance 2225 ml








Height & Weight


Height: 5'6.00"


Weight: 207lbs. 9.0oz. 94.653372xz; 32.4 BMI


Method:Actual


General Appearance:  No Apparent Distress, WD/WN


HEENT:  Normal ENT Inspection


Neck:  Full Range of Motion, Normal Inspection


Respiratory:  Lungs Clear, No Accessory Muscle Use, No Respiratory Distress


Cardiovascular:  Regular Rate, Rhythm, No Murmur


Capillary Refill:  Less Than 3 Seconds


Gastrointestinal:  non tender, soft


Extremity:  Non Tender, No Calf Tenderness, No Pedal Edema, Other (there is a 

unstageable heel pressure ulcer on the left foot and a padded heel protector 

around the right foot with no obvious pressure ulcer. There is a unstageable 

sacral pressure ulcer)


Neurologic/Psychiatric:  Alert, Other (oriented to person and place, lethargic)





Results


Lab


Laboratory Tests


9/26/18 16:20








9/27/18 03:21








9/28/18 06:25











Assessment/Plan


Assessment/Plan


 UTI 


   -IVF 


   -Continue Rocephin 


   -Pan cultures pending 


Dehydration with hypernatremia 


   -IVF- currently on 1/2 NS 


Lethargy/metabolic encephalopathy


Morbid obesity





I am going to sign off please call with any questions.











ATA SHINE DO Sep 28, 2018 8:19 am

## 2018-09-29 VITALS — SYSTOLIC BLOOD PRESSURE: 139 MMHG | DIASTOLIC BLOOD PRESSURE: 69 MMHG

## 2018-09-29 VITALS — DIASTOLIC BLOOD PRESSURE: 64 MMHG | SYSTOLIC BLOOD PRESSURE: 137 MMHG

## 2018-09-29 VITALS — DIASTOLIC BLOOD PRESSURE: 69 MMHG | SYSTOLIC BLOOD PRESSURE: 165 MMHG

## 2018-09-29 VITALS — SYSTOLIC BLOOD PRESSURE: 163 MMHG | DIASTOLIC BLOOD PRESSURE: 67 MMHG

## 2018-09-29 VITALS — SYSTOLIC BLOOD PRESSURE: 133 MMHG | DIASTOLIC BLOOD PRESSURE: 69 MMHG

## 2018-09-29 VITALS — DIASTOLIC BLOOD PRESSURE: 62 MMHG | SYSTOLIC BLOOD PRESSURE: 142 MMHG

## 2018-09-29 LAB
BASOPHILS # BLD AUTO: 0 10^3/UL (ref 0–0.1)
BASOPHILS NFR BLD AUTO: 0 % (ref 0–10)
BUN/CREAT SERPL: 15
CALCIUM SERPL-MCNC: 9.3 MG/DL (ref 8.5–10.1)
CHLORIDE SERPL-SCNC: 109 MMOL/L (ref 98–107)
CO2 SERPL-SCNC: 16 MMOL/L (ref 21–32)
CREAT SERPL-MCNC: 0.8 MG/DL (ref 0.6–1.3)
EOSINOPHIL # BLD AUTO: 0 10^3/UL (ref 0–0.3)
EOSINOPHIL NFR BLD AUTO: 0 % (ref 0–10)
ERYTHROCYTE [DISTWIDTH] IN BLOOD BY AUTOMATED COUNT: 13.1 % (ref 10–14.5)
GFR SERPLBLD BASED ON 1.73 SQ M-ARVRAT: > 60 ML/MIN
GLUCOSE SERPL-MCNC: 177 MG/DL (ref 70–105)
HCT VFR BLD CALC: 35 % (ref 35–52)
HGB BLD-MCNC: 11.8 G/DL (ref 11.5–16)
LYMPHOCYTES # BLD AUTO: 1 X 10^3 (ref 1–4)
LYMPHOCYTES NFR BLD AUTO: 15 % (ref 12–44)
MAGNESIUM SERPL-MCNC: 1.7 MG/DL (ref 1.8–2.4)
MANUAL DIFFERENTIAL PERFORMED BLD QL: NO
MCH RBC QN AUTO: 30 PG (ref 25–34)
MCHC RBC AUTO-ENTMCNC: 34 G/DL (ref 32–36)
MCV RBC AUTO: 88 FL (ref 80–99)
MONOCYTES # BLD AUTO: 0.3 X 10^3 (ref 0–1)
MONOCYTES NFR BLD AUTO: 5 % (ref 0–12)
NEUTROPHILS # BLD AUTO: 5.4 X 10^3 (ref 1.8–7.8)
NEUTROPHILS NFR BLD AUTO: 80 % (ref 42–75)
PHOSPHATE SERPL-MCNC: 2.8 MG/DL (ref 2.3–4.7)
PLATELET # BLD: 276 10^3/UL (ref 130–400)
PMV BLD AUTO: 10.7 FL (ref 7.4–10.4)
POTASSIUM SERPL-SCNC: 4 MMOL/L (ref 3.6–5)
RBC # BLD AUTO: 4 10^6/UL (ref 4.35–5.85)
SODIUM SERPL-SCNC: 137 MMOL/L (ref 135–145)
WBC # BLD AUTO: 6.7 10^3/UL (ref 4.3–11)

## 2018-09-29 RX ADMIN — DEXAMETHASONE SODIUM PHOSPHATE SCH MG: 4 INJECTION, SOLUTION INTRAMUSCULAR; INTRAVENOUS at 08:46

## 2018-09-29 RX ADMIN — FAMOTIDINE SCH MG: 20 TABLET, FILM COATED ORAL at 21:05

## 2018-09-29 RX ADMIN — SODIUM CHLORIDE SCH MLS/HR: 4.5 INJECTION, SOLUTION INTRAVENOUS at 13:00

## 2018-09-29 RX ADMIN — INSULIN ASPART SCH UNIT: 100 INJECTION, SOLUTION INTRAVENOUS; SUBCUTANEOUS at 11:36

## 2018-09-29 RX ADMIN — INSULIN ASPART SCH UNIT: 100 INJECTION, SOLUTION INTRAVENOUS; SUBCUTANEOUS at 21:05

## 2018-09-29 RX ADMIN — INSULIN ASPART SCH UNIT: 100 INJECTION, SOLUTION INTRAVENOUS; SUBCUTANEOUS at 16:18

## 2018-09-29 RX ADMIN — INSULIN ASPART SCH UNIT: 100 INJECTION, SOLUTION INTRAVENOUS; SUBCUTANEOUS at 05:14

## 2018-09-29 RX ADMIN — ATORVASTATIN CALCIUM SCH MG: 40 TABLET, FILM COATED ORAL at 21:05

## 2018-09-29 RX ADMIN — CLOPIDOGREL BISULFATE SCH MG: 75 TABLET, FILM COATED ORAL at 08:46

## 2018-09-29 NOTE — PROGRESS NOTE-HOSPITALIST
Subjective


HPI/CC On Admission


Date Seen by Provider:  Sep 29, 2018


Time Seen by Provider:  11:00


Subjective/Events-last exam


Patient about the same


Checked med and labs


Denies pain


On air mattress and won't really get out of bed


Reviewed UCx


Rocephin tolerated well





Review of Systems


General:  Malaise





Focused Exam


Lactate Level


18 16:20: Lactic Acid Level 1.32








Objective


Exam


Vital Signs





Vital Signs








  Date Time  Temp Pulse Resp B/P (MAP) Pulse Ox O2 Delivery O2 Flow Rate FiO2


 


18 12:00 97.8 72 20 142/62 (88) 96 Room Air  


 


18 12:00       0.50 





Capillary Refill : Less Than 3 Seconds


General Appearance:  No Apparent Distress, WD/WN, Chronically ill


Respiratory:  Chest Non Tender, Lungs Clear, Normal Breath Sounds, No Accessory 

Muscle Use, No Respiratory Distress


Cardiovascular:  Regular Rate, Rhythm, No Edema, No Gallop, No JVD, No Murmur, 

Normal Peripheral Pulses


Neurologic/Psychiatric:  Alert, Oriented x3, No Motor/Sensory Deficits, 

Depressed Affect





Results/Procedures


Lab


Laboratory Tests


18 05:45








Patient resulted labs reviewed.





Assessment/Plan


Assessment and Plan


Assess & Plan/Chief Complaint


UTI


Sepsis


Debility


Confusion





Plan:


IVF


IV abx





Diagnosis/Problems


Diagnosis/Problems





(1) UTI (urinary tract infection)


Status:  Acute


Qualifiers:  


   Urinary tract infection type:  acute cystitis  Hematuria presence:  without 

hematuria  Qualified Codes:  N30.00 - Acute cystitis without hematuria


(2) Sepsis


Status:  Acute


Qualifiers:  


   Sepsis type:  sepsis due to unspecified organism  Qualified Codes:  A41.9 - 

Sepsis, unspecified organism


(3) Delirium due to another medical condition, acute, hypoactive


Status:  Acute





Clinical Quality Measures


DVT/VTE Risk/Contraindication:


Risk Factor Score Per Nursin


RFS Level Per Nursing on Admit:  4+=Very High


Contraindications-Pharm:  Other *list below*











JOSEPHINE ROBERTSON DO Sep 29, 2018 12:29

## 2018-09-30 VITALS — SYSTOLIC BLOOD PRESSURE: 135 MMHG | DIASTOLIC BLOOD PRESSURE: 62 MMHG

## 2018-09-30 VITALS — SYSTOLIC BLOOD PRESSURE: 168 MMHG | DIASTOLIC BLOOD PRESSURE: 70 MMHG

## 2018-09-30 VITALS — SYSTOLIC BLOOD PRESSURE: 166 MMHG | DIASTOLIC BLOOD PRESSURE: 72 MMHG

## 2018-09-30 VITALS — DIASTOLIC BLOOD PRESSURE: 70 MMHG | SYSTOLIC BLOOD PRESSURE: 157 MMHG

## 2018-09-30 VITALS — DIASTOLIC BLOOD PRESSURE: 72 MMHG | SYSTOLIC BLOOD PRESSURE: 150 MMHG

## 2018-09-30 VITALS — SYSTOLIC BLOOD PRESSURE: 152 MMHG | DIASTOLIC BLOOD PRESSURE: 68 MMHG

## 2018-09-30 VITALS — SYSTOLIC BLOOD PRESSURE: 159 MMHG | DIASTOLIC BLOOD PRESSURE: 70 MMHG

## 2018-09-30 LAB
BASOPHILS # BLD AUTO: 0 10^3/UL (ref 0–0.1)
BASOPHILS NFR BLD AUTO: 0 % (ref 0–10)
BUN/CREAT SERPL: 14
CALCIUM SERPL-MCNC: 9.1 MG/DL (ref 8.5–10.1)
CHLORIDE SERPL-SCNC: 108 MMOL/L (ref 98–107)
CO2 SERPL-SCNC: 21 MMOL/L (ref 21–32)
CREAT SERPL-MCNC: 0.8 MG/DL (ref 0.6–1.3)
EOSINOPHIL # BLD AUTO: 0 10^3/UL (ref 0–0.3)
EOSINOPHIL NFR BLD AUTO: 0 % (ref 0–10)
ERYTHROCYTE [DISTWIDTH] IN BLOOD BY AUTOMATED COUNT: 13.2 % (ref 10–14.5)
GFR SERPLBLD BASED ON 1.73 SQ M-ARVRAT: > 60 ML/MIN
GLUCOSE SERPL-MCNC: 157 MG/DL (ref 70–105)
HCT VFR BLD CALC: 33 % (ref 35–52)
HGB BLD-MCNC: 11.3 G/DL (ref 11.5–16)
LYMPHOCYTES # BLD AUTO: 1.7 X 10^3 (ref 1–4)
LYMPHOCYTES NFR BLD AUTO: 19 % (ref 12–44)
MAGNESIUM SERPL-MCNC: 1.8 MG/DL (ref 1.8–2.4)
MANUAL DIFFERENTIAL PERFORMED BLD QL: NO
MCH RBC QN AUTO: 30 PG (ref 25–34)
MCHC RBC AUTO-ENTMCNC: 35 G/DL (ref 32–36)
MCV RBC AUTO: 88 FL (ref 80–99)
MONOCYTES # BLD AUTO: 0.5 X 10^3 (ref 0–1)
MONOCYTES NFR BLD AUTO: 6 % (ref 0–12)
NEUTROPHILS # BLD AUTO: 6.6 X 10^3 (ref 1.8–7.8)
NEUTROPHILS NFR BLD AUTO: 76 % (ref 42–75)
PHOSPHATE SERPL-MCNC: 2.6 MG/DL (ref 2.3–4.7)
PLATELET # BLD: 284 10^3/UL (ref 130–400)
PMV BLD AUTO: 11.1 FL (ref 7.4–10.4)
POTASSIUM SERPL-SCNC: 3.7 MMOL/L (ref 3.6–5)
RBC # BLD AUTO: 3.72 10^6/UL (ref 4.35–5.85)
SODIUM SERPL-SCNC: 139 MMOL/L (ref 135–145)
WBC # BLD AUTO: 8.7 10^3/UL (ref 4.3–11)

## 2018-09-30 RX ADMIN — INSULIN ASPART SCH UNIT: 100 INJECTION, SOLUTION INTRAVENOUS; SUBCUTANEOUS at 20:53

## 2018-09-30 RX ADMIN — DEXAMETHASONE SODIUM PHOSPHATE SCH MG: 4 INJECTION, SOLUTION INTRAMUSCULAR; INTRAVENOUS at 07:58

## 2018-09-30 RX ADMIN — CLOPIDOGREL BISULFATE SCH MG: 75 TABLET, FILM COATED ORAL at 07:59

## 2018-09-30 RX ADMIN — INSULIN ASPART SCH UNIT: 100 INJECTION, SOLUTION INTRAVENOUS; SUBCUTANEOUS at 11:48

## 2018-09-30 RX ADMIN — INSULIN ASPART SCH UNIT: 100 INJECTION, SOLUTION INTRAVENOUS; SUBCUTANEOUS at 05:44

## 2018-09-30 RX ADMIN — ATORVASTATIN CALCIUM SCH MG: 40 TABLET, FILM COATED ORAL at 20:19

## 2018-09-30 RX ADMIN — SODIUM CHLORIDE SCH MLS/HR: 4.5 INJECTION, SOLUTION INTRAVENOUS at 07:57

## 2018-09-30 RX ADMIN — INSULIN ASPART SCH UNIT: 100 INJECTION, SOLUTION INTRAVENOUS; SUBCUTANEOUS at 16:18

## 2018-09-30 RX ADMIN — FAMOTIDINE SCH MG: 20 TABLET, FILM COATED ORAL at 20:19

## 2018-09-30 NOTE — PROGRESS NOTE-HOSPITALIST
Subjective


HPI/CC On Admission


Date Seen by Provider:  Sep 30, 2018


Time Seen by Provider:  11:00


Subjective/Events-last exam


patient is the same


Abx tolerated


Incontinent of stool although she denies this


No pain is reported


On air mattress





Review of Systems


General:  Fatigue





Objective


Exam


Vital Signs





Vital Signs








  Date Time  Temp Pulse Resp B/P (MAP) Pulse Ox O2 Delivery O2 Flow Rate FiO2


 


18 08:00 97.6 63 18 159/70 (99) 93 Room Air  


 


18 12:00       0.50 





Capillary Refill : Less Than 3 Seconds


General Appearance:  No Apparent Distress, WD/WN, Chronically ill


Respiratory:  Chest Non Tender, Lungs Clear, Normal Breath Sounds, No Accessory 

Muscle Use, No Respiratory Distress


Cardiovascular:  Regular Rate, Rhythm, No Edema, No Gallop, No JVD, No Murmur, 

Normal Peripheral Pulses


Neurologic/Psychiatric:  Alert, Oriented x3, No Motor/Sensory Deficits, Normal 

Mood/Affect





Results/Procedures


Lab


Laboratory Tests


18 06:15








Patient resulted labs reviewed.





Assessment/Plan


Assessment and Plan


Assess & Plan/Chief Complaint


UTI


Sepsis


Debility


Confusion





Plan:


IVF


IV abx





Diagnosis/Problems


Diagnosis/Problems





(1) UTI (urinary tract infection)


Status:  Acute


Qualifiers:  


   Urinary tract infection type:  acute cystitis  Hematuria presence:  without 

hematuria  Qualified Codes:  N30.00 - Acute cystitis without hematuria


(2) Sepsis


Status:  Resolved


Qualifiers:  


   Sepsis type:  sepsis due to unspecified organism  Qualified Codes:  A41.9 - 

Sepsis, unspecified organism


(3) Delirium due to another medical condition, acute, hypoactive


Status:  Acute





Clinical Quality Measures


DVT/VTE Risk/Contraindication:


Risk Factor Score Per Nursin


RFS Level Per Nursing on Admit:  4+=Very High


Contraindications-Pharm:  Other *list below*











JOSEPHINE ROBERTSON DO Sep 30, 2018 12:12

## 2018-10-01 VITALS — SYSTOLIC BLOOD PRESSURE: 161 MMHG | DIASTOLIC BLOOD PRESSURE: 74 MMHG

## 2018-10-01 VITALS — DIASTOLIC BLOOD PRESSURE: 66 MMHG | SYSTOLIC BLOOD PRESSURE: 150 MMHG

## 2018-10-01 VITALS — DIASTOLIC BLOOD PRESSURE: 73 MMHG | SYSTOLIC BLOOD PRESSURE: 168 MMHG

## 2018-10-01 LAB
APTT PPP: YELLOW S
BACTERIA #/AREA URNS HPF: (no result) /HPF
BASOPHILS # BLD AUTO: 0 10^3/UL (ref 0–0.1)
BASOPHILS NFR BLD AUTO: 0 % (ref 0–10)
BILIRUB UR QL STRIP: NEGATIVE
BUN/CREAT SERPL: 17
CALCIUM SERPL-MCNC: 9 MG/DL (ref 8.5–10.1)
CHLORIDE SERPL-SCNC: 107 MMOL/L (ref 98–107)
CO2 SERPL-SCNC: 22 MMOL/L (ref 21–32)
CREAT SERPL-MCNC: 0.76 MG/DL (ref 0.6–1.3)
EOSINOPHIL # BLD AUTO: 0 10^3/UL (ref 0–0.3)
EOSINOPHIL NFR BLD AUTO: 0 % (ref 0–10)
ERYTHROCYTE [DISTWIDTH] IN BLOOD BY AUTOMATED COUNT: 13.4 % (ref 10–14.5)
FIBRINOGEN PPP-MCNC: CLEAR MG/DL
GFR SERPLBLD BASED ON 1.73 SQ M-ARVRAT: > 60 ML/MIN
GLUCOSE SERPL-MCNC: 132 MG/DL (ref 70–105)
GLUCOSE UR STRIP-MCNC: NEGATIVE MG/DL
HCT VFR BLD CALC: 33 % (ref 35–52)
HGB BLD-MCNC: 11 G/DL (ref 11.5–16)
KETONES UR QL STRIP: NEGATIVE
LEUKOCYTE ESTERASE UR QL STRIP: (no result)
LYMPHOCYTES # BLD AUTO: 2.3 X 10^3 (ref 1–4)
LYMPHOCYTES NFR BLD AUTO: 24 % (ref 12–44)
MAGNESIUM SERPL-MCNC: 2 MG/DL (ref 1.8–2.4)
MANUAL DIFFERENTIAL PERFORMED BLD QL: NO
MCH RBC QN AUTO: 29 PG (ref 25–34)
MCHC RBC AUTO-ENTMCNC: 33 G/DL (ref 32–36)
MCV RBC AUTO: 89 FL (ref 80–99)
MONOCYTES # BLD AUTO: 0.6 X 10^3 (ref 0–1)
MONOCYTES NFR BLD AUTO: 6 % (ref 0–12)
NEUTROPHILS # BLD AUTO: 6.7 X 10^3 (ref 1.8–7.8)
NEUTROPHILS NFR BLD AUTO: 70 % (ref 42–75)
NITRITE UR QL STRIP: NEGATIVE
PH UR STRIP: 6.5 [PH] (ref 5–9)
PHOSPHATE SERPL-MCNC: 2.8 MG/DL (ref 2.3–4.7)
PLATELET # BLD: 297 10^3/UL (ref 130–400)
PMV BLD AUTO: 10.9 FL (ref 7.4–10.4)
POTASSIUM SERPL-SCNC: 3.8 MMOL/L (ref 3.6–5)
PROT UR QL STRIP: NEGATIVE
RBC # BLD AUTO: 3.77 10^6/UL (ref 4.35–5.85)
RBC #/AREA URNS HPF: (no result) /HPF
SODIUM SERPL-SCNC: 137 MMOL/L (ref 135–145)
SP GR UR STRIP: 1.01 (ref 1.02–1.02)
SQUAMOUS #/AREA URNS HPF: (no result) /HPF
UROBILINOGEN UR-MCNC: NORMAL MG/DL
WBC # BLD AUTO: 9.7 10^3/UL (ref 4.3–11)

## 2018-10-01 RX ADMIN — INSULIN ASPART SCH UNIT: 100 INJECTION, SOLUTION INTRAVENOUS; SUBCUTANEOUS at 15:53

## 2018-10-01 RX ADMIN — ATORVASTATIN CALCIUM SCH MG: 40 TABLET, FILM COATED ORAL at 20:53

## 2018-10-01 RX ADMIN — DEXAMETHASONE SODIUM PHOSPHATE SCH MG: 4 INJECTION, SOLUTION INTRAMUSCULAR; INTRAVENOUS at 08:59

## 2018-10-01 RX ADMIN — INSULIN ASPART SCH UNIT: 100 INJECTION, SOLUTION INTRAVENOUS; SUBCUTANEOUS at 05:44

## 2018-10-01 RX ADMIN — SODIUM CHLORIDE SCH MLS/HR: 4.5 INJECTION, SOLUTION INTRAVENOUS at 00:56

## 2018-10-01 RX ADMIN — FAMOTIDINE SCH MG: 20 TABLET, FILM COATED ORAL at 20:53

## 2018-10-01 RX ADMIN — INSULIN ASPART SCH UNIT: 100 INJECTION, SOLUTION INTRAVENOUS; SUBCUTANEOUS at 21:06

## 2018-10-01 RX ADMIN — INSULIN ASPART SCH UNIT: 100 INJECTION, SOLUTION INTRAVENOUS; SUBCUTANEOUS at 11:24

## 2018-10-01 RX ADMIN — SODIUM CHLORIDE SCH MLS/HR: 4.5 INJECTION, SOLUTION INTRAVENOUS at 16:49

## 2018-10-01 RX ADMIN — CLOPIDOGREL BISULFATE SCH MG: 75 TABLET, FILM COATED ORAL at 08:59

## 2018-10-01 NOTE — PROGRESS NOTE (SOAP)
Subjective


Time Seen by a Provider:  08:14


Subjective/Events-last exam


Patient can answer questions today.


Patient more alert.





Objective


Exam





Vital Signs








  Date Time  Temp Pulse Resp B/P (MAP) Pulse Ox O2 Delivery O2 Flow Rate FiO2


 


10/1/18 00:00 98.4 53 16 150/66 (94) 96 Room Air  


 


18 20:55  62  157/70 (99)    


 


18 19:26 97.9 57 18 168/70 (102) 98 Room Air  


 


18 15:53 97.4 65 18 135/62 (86) 97 Room Air  


 


18 12:52 98.5 61 18 152/68 (96) 97 Room Air  














I & O 


 


 10/1/18





 07:00


 


Intake Total 2762 ml


 


Output Total 2975 ml


 


Balance -213 ml





Capillary Refill : Less Than 3 Seconds


General Appearance:  No Apparent Distress, WD/WN


HEENT:  Normal ENT Inspection


Neck:  Full Range of Motion, Normal Inspection


Respiratory:  Lungs Clear, No Accessory Muscle Use, No Respiratory Distress


Cardiovascular:  Regular Rate, Rhythm, No Murmur


Gastrointestinal:  non tender, soft





Results


Lab


Laboratory Tests


10/1/18 05:55








Laboratory Tests


18 10:57: Glucometer 218H


18 15:56: Glucometer 210H


18 20:44: Glucometer 242H


10/1/18 05:43: Glucometer 127H


10/1/18 05:55: 


White Blood Count 9.7, Red Blood Count 3.77L, Hemoglobin 11.0L, Hematocrit 33L, 

Mean Corpuscular Volume 89, Mean Corpuscular Hemoglobin 29, Mean Corpuscular 

Hemoglobin Concent 33, Red Cell Distribution Width 13.4, Platelet Count 297, 

Mean Platelet Volume 10.9H, Neutrophils (%) (Auto) 70, Lymphocytes (%) (Auto) 24

, Monocytes (%) (Auto) 6, Eosinophils (%) (Auto) 0, Basophils (%) (Auto) 0, 

Neutrophils # (Auto) 6.7, Lymphocytes # (Auto) 2.3, Monocytes # (Auto) 0.6, 

Eosinophils # (Auto) 0.0, Basophils # (Auto) 0.0, Sodium Level 137, Potassium 

Level 3.8, Chloride Level 107, Carbon Dioxide Level 22, Anion Gap 8, Blood Urea 

Nitrogen 13, Creatinine 0.76, Estimat Glomerular Filtration Rate > 60, BUN/

Creatinine Ratio 17, Glucose Level 132H, Calcium Level 9.0, Phosphorus Level 2.8

, Magnesium Level 2.0





Microbiology


18 Blood Culture - Preliminary, Resulted


          No growth


18 Urine Culture - Final, Complete


          Enterococcus faecalis





Assessment/Plan


Assessment/Plan


Assess & Plan/Chief Complaint


Acute mental status change.


Renal insufficiency..


Patient verbal this morning but confused.


Lethargic.


Brain tumor.


UTI.


Coronary artery disease.


Hypertension.


Diabetes


.


10/1/18.


Patient able to converse today and talking better.


Brain tumor.


UTI.


Coronary artery disease.


Hypertension.


Diabetes.


Plan to discharge tomorrow





Clinical Quality Measures


Admission Status


Admission Dx


Altered mental status.


Lethargic.


Brain tumor.


UTI.


Coronary artery disease.


Hypertension.


Diabetes.


Nonverbal





DVT/VTE Risk/Contraindication:


Risk Factor Score Per Nursin


RFS Level Per Nursing on Admit:  4+=Very High


Contraindications-Pharm:  Other *list below*











RODY MOSER DO Oct 1, 2018 08:18

## 2018-10-01 NOTE — OCC THERAPY PROGRESS NOTE
Therapy Progress Note


Attempted to see pt.  Pt. asleep and has difficulty fully waking up.  Groggy 

and continues to shut eyes.





1, visit


1445











BLU ESPINOZA OT Oct 1, 2018 14:53

## 2018-10-02 VITALS — SYSTOLIC BLOOD PRESSURE: 147 MMHG | DIASTOLIC BLOOD PRESSURE: 70 MMHG

## 2018-10-02 VITALS — DIASTOLIC BLOOD PRESSURE: 81 MMHG | SYSTOLIC BLOOD PRESSURE: 133 MMHG

## 2018-10-02 RX ADMIN — INSULIN ASPART SCH UNIT: 100 INJECTION, SOLUTION INTRAVENOUS; SUBCUTANEOUS at 05:44

## 2018-10-02 RX ADMIN — CLOPIDOGREL BISULFATE SCH MG: 75 TABLET, FILM COATED ORAL at 08:12

## 2018-10-02 RX ADMIN — SODIUM CHLORIDE SCH MLS/HR: 4.5 INJECTION, SOLUTION INTRAVENOUS at 10:13

## 2018-10-02 RX ADMIN — INSULIN ASPART SCH UNIT: 100 INJECTION, SOLUTION INTRAVENOUS; SUBCUTANEOUS at 11:19

## 2018-10-02 NOTE — PROGRESS NOTE (SOAP)
Subjective


Time Seen by a Provider:  08:24


Subjective/Events-last exam


patient feeling better.


Patient wants to go home.


Patient pulled out IV area


Patient refused blood tests.


patient coherent and doing good





Objective


Exam





Vital Signs








  Date Time  Temp Pulse Resp B/P (MAP) Pulse Ox O2 Delivery O2 Flow Rate FiO2


 


10/2/18 00:00 97.9 63 20 147/70 (95) 95 Room Air  


 


10/1/18 15:45 97.1 60 16 168/73 (104) 96 Room Air  














I & O 


 


 10/2/18





 07:00


 


Intake Total 1720 ml


 


Output Total 600 ml


 


Balance 1120 ml





Capillary Refill : Less Than 3 Seconds


General Appearance:  No Apparent Distress, WD/WN


Neck:  Full Range of Motion, Normal Inspection


Respiratory:  Chest Non Tender, Lungs Clear, No Accessory Muscle Use, No 

Respiratory Distress


Cardiovascular:  Regular Rate, Rhythm, No Murmur


Gastrointestinal:  non tender, soft





Results


Lab


Laboratory Tests


10/1/18 10:18: 


Urine Color YELLOW, Urine Clarity CLEAR, Urine pH 6.5, Urine Specific Gravity 

1.010L, Urine Protein NEGATIVE, Urine Glucose (UA) NEGATIVE, Urine Ketones 

NEGATIVE, Urine Nitrite NEGATIVE, Urine Bilirubin NEGATIVE, Urine Urobilinogen 

NORMAL, Urine Leukocyte Esterase 3+H, Urine RBC (Auto) NEGATIVE, Urine RBC NONE

, Urine WBC 10-25H, Urine Squamous Epithelial Cells 2-5, Urine Crystals NONE, 

Urine Bacteria TRACE, Urine Casts NONE, Urine Mucus NEGATIVE, Urine Culture 

Indicated YES


10/1/18 11:09: Glucometer 224H


10/1/18 15:48: Glucometer 331H


10/1/18 20:14: Glucometer 292H


10/2/18 05:24: Glucometer 164H





Microbiology


18 Blood Culture - Preliminary, Resulted


          No growth


18 Urine Culture - Final, Complete


          Enterococcus faecalis





Assessment/Plan


Assessment/Plan


Assess & Plan/Chief Complaint


Acute mental status change.


Renal insufficiency..


Patient verbal this morning but confused.


Lethargic.


Brain tumor.


UTI.


Coronary artery disease.


Hypertension.


Diabetes


.


10/1/18.


Patient able to converse today and talking better.


Brain tumor.


UTI.


Coronary artery disease.


Hypertension.


Diabetes.


Plan to discharge tomorrow.


.


10/2/18.


UTI resolved.


Coronary artery disease.


Hypertension.


Diabetes.


Brain tumor.


Patient feeling better and wants to go home


Patient discharged today





Clinical Quality Measures


Admission Status


Admission Dx


Altered mental status.


Lethargic.


Brain tumor.


UTI.


Coronary artery disease.


Hypertension.


Diabetes.


Nonverbal





DVT/VTE Risk/Contraindication:


Risk Factor Score Per Nursin


RFS Level Per Nursing on Admit:  4+=Very High


Contraindications-Pharm:  Other *list below*











RODY MOSER DO Oct 2, 2018 08:25

## 2018-10-02 NOTE — D/C HH FACE TO FACE ORDER
D/C  Face to Face Orders


Instructions for Patient


Patient Instructions/FollowUp:  


 office this Friday


Physician to follow Patient:  yes


Discharge Diet for Home:  [facility] Diet





Patient Data-Allergies,Ht & Wt


Patient Allergies:  


Coded Allergies:  


     morphine (Unverified  Allergy, Mild, 4/7/14)


 


 "ACTS WEIRD"


     Penicillins (Unverified  Allergy, Unknown, 7/1/08)


Height (Feet):  5


Height (Inches):  6.00


Weight (Pounds):  202


Weight (Ounces):  9.0





Home Health Need/Face to Face


Date of Face to Face:  Oct 2, 2018


Clinical Findings:  Generalized weakness and fatigue


I have seen Pt face-to-face:  Yes


Discharged To:  [facility]


Diagnosis/Conditions:  


brain tumor.


UTI.


Weakness.


Hypertension.


Diabetes.


Coronary artery disease


Patient is Homebound due to:  Yasir fall risk due to instabilty


Homebound Status


   Due to the above stated illness, injury or surgical procedure (medical 

condition or diagnosis) and associated clinical findings, the patient is 

homebound because of his/her inability to leave home except with aid of a 

supportive device and/or person AND leaving the home requires a considerable 

and taxing effort or is medically contraindicated.


Pt req the following assistanc:  Wheelchair





Home Health Nursing Orders


Home Health Services Order:  Physical Therapy-Evaluate & Treat





Home Health Infusion Therapy


Line Start Date:  Sep 26, 2018


Line Start Time:  2300


Line Type:  Peripheral IV


Site Location:  Hand


Certify Stmt


I certify that this patient is under my care and that I, a nurse practitioner 

or a physician; a assistant working with me, had a face to face encounter that -

meets the physician face to face encounter requirements with this patient as 

dated.











RODY MOSER DO Oct 2, 2018 08:31

## 2018-10-02 NOTE — DISCHARGE INST-SKILLED NURSING
Discharge Inst-Skilled NF


Patient Instructions


Patient Problems:  


weakness


confusion history


diabetes


coronary artery disease


brain tumor





Consult/Follow Up/Orders


Skilled NF Admit to:  [facility]


Certification (SNF)


I certify that SNF services are required to be given on an inpatient basis 

because of the above named patient's need for skilled nursing care on a 

continuing basis for the conditions(s) for which he/she was receiving inpatient 

hospital services prior to his/her transfer to the SNF.


Skilled Nursing Facility Order:  Nursing Services, Occupational Ther-Evaluate & 

Treat, Physical Therapy-Evaluate & Treat


Discharge Diet:  ADA Diet





New & Resume Previous Orders


Margarito Moser 


Oct 2, 2018 


09:10











MARGARITO MOSER DO Oct 2, 2018 09:12

## 2018-10-03 NOTE — DISCHARGE SUMMARY
Diagnosis/Chief Complaint


Date of Admission


Sep 26, 2018 at 18:50


Date of Discharge


Oct 2, 2018 at 16:01


Discharge Date:  Oct 2, 2018


Discharge Time:  07:45


Discharge Diagnosis


Acute mental status change.


Renal insufficiency.


Diabetes.


UTI.


Enterococcus faecilas and Proteus mirabilis with UTI.


Brain tumor.


C. difficile colitis.


Dehydration.


Hypertension.


Coronary artery disease





Reason Hospital Visit


Patient is a resident of  nursing home.


Patient has been lethargic the last few days and somnolent.


Patient not able to speak in the emergency room.


Patient running an elevated temperature of 101.


Patient has a history of brain tumor.


Patient seen by neurologist and stated that the brain tumor was getting bigger 

by MRI.


Patient has altered mental status this morning.


Patient can mention her name but not able to give any history or talk.


Patient has renal insufficiency.


UA shows white blood cells needs to have a culture done.


Patient lethargic.


Patient not talking much.


Patient is a known diabetic.


Patient has coronary artery disease.


Patient has history of hypertension





Discharge Summary


Consultations


Pulmonology


Discharge Physical Examination


Allergies:  


Coded Allergies:  


     morphine (Unverified  Allergy, Mild, 14)


 


 "ACTS WEIRD"


     Penicillins (Unverified  Allergy, Unknown, 08)


Vitals & I&Os





Vital Signs








  Date Time  Temp Pulse Resp B/P (MAP) Pulse Ox O2 Delivery O2 Flow Rate FiO2


 


10/2/18 15:59  60 18 133/81 96 Room Air  


 


10/2/18 08:32 99.1       


 


18 12:00       0.50 











Hospital Course


Patient mental status didn't improve with receiving Decadron for brain tumor 

and became more alert


Labs (last 24 hrs)


Laboratory Tests


18 16:18: Glucometer 200H


18 16:20: 


White Blood Count 15.0H, Red Blood Count 4.37, Hemoglobin 12.9, Hematocrit 40, 

Mean Corpuscular Volume 92, Mean Corpuscular Hemoglobin 30, Mean Corpuscular 

Hemoglobin Concent 32, Red Cell Distribution Width 15.0H, Platelet Count 456H, 

Mean Platelet Volume 10.5H, Neutrophils (%) (Auto) 75, Lymphocytes (%) (Auto) 18

, Monocytes (%) (Auto) 7, Eosinophils (%) (Auto) 0, Basophils (%) (Auto) 0, 

Neutrophils # (Auto) 11.2H, Lymphocytes # (Auto) 2.6, Monocytes # (Auto) 1.1H, 

Eosinophils # (Auto) 0.0, Basophils # (Auto) 0.0, Neutrophils % (Manual) 74, 

Lymphocytes % (Manual) 16, Monocytes % (Manual) 6, Reactive Lymphocytes 4, 

Blood Morphology Comment NORMAL, Prothrombin Time 14.2, INR Comment 1.1, 

Activated Partial Thromboplast Time 31, Sodium Level 149H, Potassium Level 4.1, 

Chloride Level 116H, Carbon Dioxide Level 23, Anion Gap 10, Blood Urea Nitrogen 

25H, Creatinine 1.37H, Estimat Glomerular Filtration Rate 38, BUN/Creatinine 

Ratio 18, Glucose Level 209H, Lactic Acid Level 1.32, Calcium Level 9.7, 

Corrected Calcium 9.8, Total Bilirubin 0.6, Aspartate Amino Transf (AST/SGOT) 13

, Alanine Aminotransferase (ALT/SGPT) 9, Alkaline Phosphatase 73, Total Protein 

7.5, Albumin 3.9


18 16:30: 


Urine Color YELLOW, Urine Clarity VERY CLOUDYH, Urine pH 5, Urine Specific 

Gravity 1.015L, Urine Protein 3+H, Urine Glucose (UA) NEGATIVE, Urine Ketones 1+

H, Urine Nitrite NEGATIVE, Urine Bilirubin NEGATIVE, Urine Urobilinogen NORMAL, 

Urine Leukocyte Esterase 3+H, Urine RBC (Auto) 5+H, Urine RBC NONE, Urine WBC 50

-100H, Urine Squamous Epithelial Cells >50H, Urine Crystals NONE, Urine 

Bacteria FEWH, Urine Casts NONE, Urine Mucus NEGATIVE, Urine Culture Indicated 

NO


18 18:50: Lab Scanned Report Referred Lab Report


18 21:39: Glucometer 175H


18 03:21: 


White Blood Count 12.5H, Red Blood Count 4.14L, Hemoglobin 12.1, Hematocrit 39, 

Mean Corpuscular Volume 94, Mean Corpuscular Hemoglobin 29, Mean Corpuscular 

Hemoglobin Concent 31L, Red Cell Distribution Width 14.8H, Platelet Count 340, 

Mean Platelet Volume 10.0, Neutrophils (%) (Auto) 65, Lymphocytes (%) (Auto) 25

, Monocytes (%) (Auto) 8, Eosinophils (%) (Auto) 1, Basophils (%) (Auto) 0, 

Neutrophils # (Auto) 8.2H, Lymphocytes # (Auto) 3.2, Monocytes # (Auto) 1.0, 

Eosinophils # (Auto) 0.1, Basophils # (Auto) 0.1, Sodium Level 148H, Potassium 

Level 3.8, Chloride Level 119H, Carbon Dioxide Level 20L, Anion Gap 9, Blood 

Urea Nitrogen 19H, Creatinine 0.94, Estimat Glomerular Filtration Rate 59, BUN/

Creatinine Ratio 20, Glucose Level 113H, Calcium Level 9.2, Corrected Calcium 

9.4, Phosphorus Level 2.8, Magnesium Level 2.1, Total Bilirubin 0.5, Aspartate 

Amino Transf (AST/SGOT) 11, Alanine Aminotransferase (ALT/SGPT) 9, Alkaline 

Phosphatase 66, Total Protein 6.8, Albumin 3.7


18 09:46: 


Urine Color YELLOW, Urine Clarity CLEAR, Urine pH 6, Urine Specific Gravity 

1.010L, Urine Protein NEGATIVE, Urine Glucose (UA) NEGATIVE, Urine Ketones 

NEGATIVE, Urine Nitrite NEGATIVE, Urine Bilirubin NEGATIVE, Urine Urobilinogen 

NORMAL, Urine Leukocyte Esterase 2+H, Urine RBC (Auto) NEGATIVE, Urine RBC RARE

, Urine WBC 10-25H, Urine Squamous Epithelial Cells 2-5, Urine Renal Epithelial 

Cells NONE, Urine Crystals NONE, Urine Bacteria TRACE, Urine Casts PRESENT, 

Urine Hyaline Casts 2-5H, Urine Mucus SMALLH, Urine Culture Indicated YES


18 11:52: Glucometer 89


18 16:27: Glucometer 84


18 21:03: Glucometer 114H


18 05:20: Glucometer 75


18 06:25: 


White Blood Count 9.8, Red Blood Count 3.72L, Hemoglobin 11.3L, Hematocrit 34L, 

Mean Corpuscular Volume 91, Mean Corpuscular Hemoglobin 30, Mean Corpuscular 

Hemoglobin Concent 33, Red Cell Distribution Width 13.6, Platelet Count 251, 

Mean Platelet Volume 10.4, Neutrophils (%) (Auto) 70, Lymphocytes (%) (Auto) 22

, Monocytes (%) (Auto) 6, Eosinophils (%) (Auto) 2, Basophils (%) (Auto) 0, 

Neutrophils # (Auto) 6.9, Lymphocytes # (Auto) 2.2, Monocytes # (Auto) 0.6, 

Eosinophils # (Auto) 0.2, Basophils # (Auto) 0.0, Sodium Level 137, Potassium 

Level 3.3L, Chloride Level 110H, Carbon Dioxide Level 20L, Anion Gap 7, Blood 

Urea Nitrogen 10, Creatinine 0.78, Estimat Glomerular Filtration Rate > 60, BUN/

Creatinine Ratio 13, Glucose Level 71, Calcium Level 8.8, Phosphorus Level 2.8, 

Magnesium Level 1.7L


18 11:09: Glucometer 172H


18 16:26: Glucometer 226H


18 20:16: Glucometer 262H


18 05:08: Glucometer 186H


18 05:45: 


White Blood Count 6.7, Red Blood Count 4.00L, Hemoglobin 11.8, Hematocrit 35, 

Mean Corpuscular Volume 88, Mean Corpuscular Hemoglobin 30, Mean Corpuscular 

Hemoglobin Concent 34, Red Cell Distribution Width 13.1, Platelet Count 276, 

Mean Platelet Volume 10.7H, Neutrophils (%) (Auto) 80H, Lymphocytes (%) (Auto) 

15, Monocytes (%) (Auto) 5, Eosinophils (%) (Auto) 0, Basophils (%) (Auto) 0, 

Neutrophils # (Auto) 5.4, Lymphocytes # (Auto) 1.0, Monocytes # (Auto) 0.3, 

Eosinophils # (Auto) 0.0, Basophils # (Auto) 0.0, Sodium Level 137, Potassium 

Level 4.0, Chloride Level 109H, Carbon Dioxide Level 16L, Anion Gap 12, Blood 

Urea Nitrogen 12, Creatinine 0.80, Estimat Glomerular Filtration Rate > 60, BUN/

Creatinine Ratio 15, Glucose Level 177H, Calcium Level 9.3, Phosphorus Level 2.8

, Magnesium Level 1.7L


18 11:16: Glucometer 228H


18 16:06: Glucometer 220H


18 20:18: Glucometer 249H


18 05:41: Glucometer 163H


18 06:15: 


White Blood Count 8.7, Red Blood Count 3.72L, Hemoglobin 11.3L, Hematocrit 33L, 

Mean Corpuscular Volume 88, Mean Corpuscular Hemoglobin 30, Mean Corpuscular 

Hemoglobin Concent 35, Red Cell Distribution Width 13.2, Platelet Count 284, 

Mean Platelet Volume 11.1H, Neutrophils (%) (Auto) 76H, Lymphocytes (%) (Auto) 

19, Monocytes (%) (Auto) 6, Eosinophils (%) (Auto) 0, Basophils (%) (Auto) 0, 

Neutrophils # (Auto) 6.6, Lymphocytes # (Auto) 1.7, Monocytes # (Auto) 0.5, 

Eosinophils # (Auto) 0.0, Basophils # (Auto) 0.0, Sodium Level 139, Potassium 

Level 3.7, Chloride Level 108H, Carbon Dioxide Level 21, Anion Gap 10, Blood 

Urea Nitrogen 11, Creatinine 0.80, Estimat Glomerular Filtration Rate > 60, BUN/

Creatinine Ratio 14, Glucose Level 157H, Calcium Level 9.1, Phosphorus Level 2.6

, Magnesium Level 1.8


18 10:57: Glucometer 218H


18 15:56: Glucometer 210H


18 20:44: Glucometer 242H


10/1/18 05:43: Glucometer 127H


10/1/18 05:55: 


White Blood Count 9.7, Red Blood Count 3.77L, Hemoglobin 11.0L, Hematocrit 33L, 

Mean Corpuscular Volume 89, Mean Corpuscular Hemoglobin 29, Mean Corpuscular 

Hemoglobin Concent 33, Red Cell Distribution Width 13.4, Platelet Count 297, 

Mean Platelet Volume 10.9H, Neutrophils (%) (Auto) 70, Lymphocytes (%) (Auto) 24

, Monocytes (%) (Auto) 6, Eosinophils (%) (Auto) 0, Basophils (%) (Auto) 0, 

Neutrophils # (Auto) 6.7, Lymphocytes # (Auto) 2.3, Monocytes # (Auto) 0.6, 

Eosinophils # (Auto) 0.0, Basophils # (Auto) 0.0, Sodium Level 137, Potassium 

Level 3.8, Chloride Level 107, Carbon Dioxide Level 22, Anion Gap 8, Blood Urea 

Nitrogen 13, Creatinine 0.76, Estimat Glomerular Filtration Rate > 60, BUN/

Creatinine Ratio 17, Glucose Level 132H, Calcium Level 9.0, Phosphorus Level 2.8

, Magnesium Level 2.0


10/1/18 10:18: 


Urine Color YELLOW, Urine Clarity CLEAR, Urine pH 6.5, Urine Specific Gravity 

1.010L, Urine Protein NEGATIVE, Urine Glucose (UA) NEGATIVE, Urine Ketones 

NEGATIVE, Urine Nitrite NEGATIVE, Urine Bilirubin NEGATIVE, Urine Urobilinogen 

NORMAL, Urine Leukocyte Esterase 3+H, Urine RBC (Auto) NEGATIVE, Urine RBC NONE

, Urine WBC 10-25H, Urine Squamous Epithelial Cells 2-5, Urine Crystals NONE, 

Urine Bacteria TRACE, Urine Casts NONE, Urine Mucus NEGATIVE, Urine Culture 

Indicated YES


10/1/18 11:09: Glucometer 224H


10/1/18 15:48: Glucometer 331H


10/1/18 20:14: Glucometer 292H


10/2/18 05:24: Glucometer 164H


10/2/18 10:57: Glucometer 203H





Microbiology


18 Blood Culture - Final, Complete


          No growth


10/1/18 Urine Culture - Preliminary, Resulted


          Enterococcus faecalis





Laboratory Tests


18 16:20








18 03:21








18 06:25








18 05:45








18 06:15








10/1/18 05:55








Pending Labs





Microbiology








 Date/Time


Source Procedure


Growth Status





 


 18 17:05


Peripheral Lt Hand Blood Culture - Final


No growth Complete


 


 18 16:20


Peripheral Rt Ac Blood Culture - Final


No growth Complete


 


 10/1/18 10:18


Urine Clean Catch Urine Culture - Preliminary


Enterococcus faecalis Resulted


 


 18 09:46


Urine Voided Urine Urine Culture - Final


Enterococcus faecalis Complete


 


 18 16:30


Urine Clean Catch Urine Culture - Final


Proteus mirabilis Complete





Laboratory Tests


18 16:18: Glucometer 200


18 16:20: 


White Blood Count 15.0, Red Blood Count 4.37, Hemoglobin 12.9, Hematocrit 40, 

Mean Corpuscular Volume 92, Mean Corpuscular Hemoglobin 30, Mean Corpuscular 

Hemoglobin Concent 32, Red Cell Distribution Width 15.0, Platelet Count 456, 

Mean Platelet Volume 10.5, Neutrophils (%) (Auto) 75, Lymphocytes (%) (Auto) 18

, Monocytes (%) (Auto) 7, Eosinophils (%) (Auto) 0, Basophils (%) (Auto) 0, 

Neutrophils # (Auto) 11.2, Lymphocytes # (Auto) 2.6, Monocytes # (Auto) 1.1, 

Eosinophils # (Auto) 0.0, Basophils # (Auto) 0.0, Neutrophils % (Manual) 74, 

Lymphocytes % (Manual) 16, Monocytes % (Manual) 6, Reactive Lymphocytes 4, 

Blood Morphology Comment NORMAL, Prothrombin Time 14.2, INR Comment 1.1, 

Activated Partial Thromboplast Time 31, Sodium Level 149, Potassium Level 4.1, 

Chloride Level 116, Carbon Dioxide Level 23, Anion Gap 10, Blood Urea Nitrogen 

25, Creatinine 1.37, Estimat Glomerular Filtration Rate 38, BUN/Creatinine 

Ratio 18, Glucose Level 209, Lactic Acid Level 1.32, Calcium Level 9.7, 

Corrected Calcium 9.8, Total Bilirubin 0.6, Aspartate Amino Transf (AST/SGOT) 13

, Alanine Aminotransferase (ALT/SGPT) 9, Alkaline Phosphatase 73, Total Protein 

7.5, Albumin 3.9


18 16:30: 


Urine Color YELLOW, Urine Clarity VERY CLOUDY, Urine pH 5, Urine Specific 

Gravity 1.015, Urine Protein 3+, Urine Glucose (UA) NEGATIVE, Urine Ketones 1+, 

Urine Nitrite NEGATIVE, Urine Bilirubin NEGATIVE, Urine Urobilinogen NORMAL, 

Urine Leukocyte Esterase 3+, Urine RBC (Auto) 5+, Urine RBC NONE, Urine WBC 50-

100, Urine Squamous Epithelial Cells >50, Urine Crystals NONE, Urine Bacteria 

FEW, Urine Casts NONE, Urine Mucus NEGATIVE, Urine Culture Indicated NO


18 18:50: Lab Scanned Report Referred Lab Report


18 21:39: Glucometer 175


18 03:21: 


White Blood Count 12.5, Red Blood Count 4.14, Hemoglobin 12.1, Hematocrit 39, 

Mean Corpuscular Volume 94, Mean Corpuscular Hemoglobin 29, Mean Corpuscular 

Hemoglobin Concent 31, Red Cell Distribution Width 14.8, Platelet Count 340, 

Mean Platelet Volume 10.0, Neutrophils (%) (Auto) 65, Lymphocytes (%) (Auto) 25

, Monocytes (%) (Auto) 8, Eosinophils (%) (Auto) 1, Basophils (%) (Auto) 0, 

Neutrophils # (Auto) 8.2, Lymphocytes # (Auto) 3.2, Monocytes # (Auto) 1.0, 

Eosinophils # (Auto) 0.1, Basophils # (Auto) 0.1, Sodium Level 148, Potassium 

Level 3.8, Chloride Level 119, Carbon Dioxide Level 20, Anion Gap 9, Blood Urea 

Nitrogen 19, Creatinine 0.94, Estimat Glomerular Filtration Rate 59, BUN/

Creatinine Ratio 20, Glucose Level 113, Calcium Level 9.2, Corrected Calcium 9.4

, Phosphorus Level 2.8, Magnesium Level 2.1, Total Bilirubin 0.5, Aspartate 

Amino Transf (AST/SGOT) 11, Alanine Aminotransferase (ALT/SGPT) 9, Alkaline 

Phosphatase 66, Total Protein 6.8, Albumin 3.7


18 09:46: 


Urine Color YELLOW, Urine Clarity CLEAR, Urine pH 6, Urine Specific Gravity 

1.010, Urine Protein NEGATIVE, Urine Glucose (UA) NEGATIVE, Urine Ketones 

NEGATIVE, Urine Nitrite NEGATIVE, Urine Bilirubin NEGATIVE, Urine Urobilinogen 

NORMAL, Urine Leukocyte Esterase 2+, Urine RBC (Auto) NEGATIVE, Urine RBC RARE, 

Urine WBC 10-25, Urine Squamous Epithelial Cells 2-5, Urine Renal Epithelial 

Cells NONE, Urine Crystals NONE, Urine Bacteria TRACE, Urine Casts PRESENT, 

Urine Hyaline Casts 2-5, Urine Mucus SMALL, Urine Culture Indicated YES


18 11:52: Glucometer 89


18 16:27: Glucometer 84


18 21:03: Glucometer 114


18 05:20: Glucometer 75


18 06:25: 


White Blood Count 9.8, Red Blood Count 3.72, Hemoglobin 11.3, Hematocrit 34, 

Mean Corpuscular Volume 91, Mean Corpuscular Hemoglobin 30, Mean Corpuscular 

Hemoglobin Concent 33, Red Cell Distribution Width 13.6, Platelet Count 251, 

Mean Platelet Volume 10.4, Neutrophils (%) (Auto) 70, Lymphocytes (%) (Auto) 22

, Monocytes (%) (Auto) 6, Eosinophils (%) (Auto) 2, Basophils (%) (Auto) 0, 

Neutrophils # (Auto) 6.9, Lymphocytes # (Auto) 2.2, Monocytes # (Auto) 0.6, 

Eosinophils # (Auto) 0.2, Basophils # (Auto) 0.0, Sodium Level 137, Potassium 

Level 3.3, Chloride Level 110, Carbon Dioxide Level 20, Anion Gap 7, Blood Urea 

Nitrogen 10, Creatinine 0.78, Estimat Glomerular Filtration Rate > 60, BUN/

Creatinine Ratio 13, Glucose Level 71, Calcium Level 8.8, Phosphorus Level 2.8, 

Magnesium Level 1.7


18 11:09: Glucometer 172


18 16:26: Glucometer 226


18 20:16: Glucometer 262


18 05:08: Glucometer 186


18 05:45: 


White Blood Count 6.7, Red Blood Count 4.00, Hemoglobin 11.8, Hematocrit 35, 

Mean Corpuscular Volume 88, Mean Corpuscular Hemoglobin 30, Mean Corpuscular 

Hemoglobin Concent 34, Red Cell Distribution Width 13.1, Platelet Count 276, 

Mean Platelet Volume 10.7, Neutrophils (%) (Auto) 80, Lymphocytes (%) (Auto) 15

, Monocytes (%) (Auto) 5, Eosinophils (%) (Auto) 0, Basophils (%) (Auto) 0, 

Neutrophils # (Auto) 5.4, Lymphocytes # (Auto) 1.0, Monocytes # (Auto) 0.3, 

Eosinophils # (Auto) 0.0, Basophils # (Auto) 0.0, Sodium Level 137, Potassium 

Level 4.0, Chloride Level 109, Carbon Dioxide Level 16, Anion Gap 12, Blood 

Urea Nitrogen 12, Creatinine 0.80, Estimat Glomerular Filtration Rate > 60, BUN/

Creatinine Ratio 15, Glucose Level 177, Calcium Level 9.3, Phosphorus Level 2.8

, Magnesium Level 1.7


18 11:16: Glucometer 228


18 16:06: Glucometer 220


18 20:18: Glucometer 249


18 05:41: Glucometer 163


18 06:15: 


White Blood Count 8.7, Red Blood Count 3.72, Hemoglobin 11.3, Hematocrit 33, 

Mean Corpuscular Volume 88, Mean Corpuscular Hemoglobin 30, Mean Corpuscular 

Hemoglobin Concent 35, Red Cell Distribution Width 13.2, Platelet Count 284, 

Mean Platelet Volume 11.1, Neutrophils (%) (Auto) 76, Lymphocytes (%) (Auto) 19

, Monocytes (%) (Auto) 6, Eosinophils (%) (Auto) 0, Basophils (%) (Auto) 0, 

Neutrophils # (Auto) 6.6, Lymphocytes # (Auto) 1.7, Monocytes # (Auto) 0.5, 

Eosinophils # (Auto) 0.0, Basophils # (Auto) 0.0, Sodium Level 139, Potassium 

Level 3.7, Chloride Level 108, Carbon Dioxide Level 21, Anion Gap 10, Blood 

Urea Nitrogen 11, Creatinine 0.80, Estimat Glomerular Filtration Rate > 60, BUN/

Creatinine Ratio 14, Glucose Level 157, Calcium Level 9.1, Phosphorus Level 2.6

, Magnesium Level 1.8


18 10:57: Glucometer 218


18 15:56: Glucometer 210


18 20:44: Glucometer 242


10/1/18 05:43: Glucometer 127


10/1/18 05:55: 


White Blood Count 9.7, Red Blood Count 3.77, Hemoglobin 11.0, Hematocrit 33, 

Mean Corpuscular Volume 89, Mean Corpuscular Hemoglobin 29, Mean Corpuscular 

Hemoglobin Concent 33, Red Cell Distribution Width 13.4, Platelet Count 297, 

Mean Platelet Volume 10.9, Neutrophils (%) (Auto) 70, Lymphocytes (%) (Auto) 24

, Monocytes (%) (Auto) 6, Eosinophils (%) (Auto) 0, Basophils (%) (Auto) 0, 

Neutrophils # (Auto) 6.7, Lymphocytes # (Auto) 2.3, Monocytes # (Auto) 0.6, 

Eosinophils # (Auto) 0.0, Basophils # (Auto) 0.0, Sodium Level 137, Potassium 

Level 3.8, Chloride Level 107, Carbon Dioxide Level 22, Anion Gap 8, Blood Urea 

Nitrogen 13, Creatinine 0.76, Estimat Glomerular Filtration Rate > 60, BUN/

Creatinine Ratio 17, Glucose Level 132, Calcium Level 9.0, Phosphorus Level 2.8

, Magnesium Level 2.0


10/1/18 10:18: 


Urine Color YELLOW, Urine Clarity CLEAR, Urine pH 6.5, Urine Specific Gravity 

1.010, Urine Protein NEGATIVE, Urine Glucose (UA) NEGATIVE, Urine Ketones 

NEGATIVE, Urine Nitrite NEGATIVE, Urine Bilirubin NEGATIVE, Urine Urobilinogen 

NORMAL, Urine Leukocyte Esterase 3+, Urine RBC (Auto) NEGATIVE, Urine RBC NONE, 

Urine WBC 10-25, Urine Squamous Epithelial Cells 2-5, Urine Crystals NONE, 

Urine Bacteria TRACE, Urine Casts NONE, Urine Mucus NEGATIVE, Urine Culture 

Indicated YES


10/1/18 11:09: Glucometer 224


10/1/18 15:48: Glucometer 331


10/1/18 20:14: Glucometer 292


10/2/18 05:24: Glucometer 164


10/2/18 10:57: Glucometer 203





Discussion & Recommendations


Patient transferred back to nursing home.


Patient wanted discharge.


Patient pulled out IV.


Patient refused blood tests.


Patient alert and coherent





Discharge


Home Medications:





Active Scripts


Active


Reported


Tussin (Guaifenesin) 100 Mg/5 Ml Liquid 5 Ml PO Q4H PRN


Tylenol Extra Strength (Acetaminophen) 500 Mg Tablet 500 Mg PO Q4H PRN


Spironolactone 25 Mg Tablet 25 Mg PO DAILY


Phenergan (Promethazine HCl) 25 Mg Supp.rect 25 Mg RC Q8H PRN


Capsaicin 42.5 Gm Cream..g.  TP Q4H PRN


Amlodipine Besylate 10 Mg Tablet 10 Mg PO DAILY


     HOLD AND NOTIFY PHYSICIAN IF SBP<100 OR HR<60


Glimepiride 1 Mg Tablet 1 Mg PO DAILY


Acetaminophen 650 Mg Supp.rect 650 Mg RC Q6H PRN


Escitalopram Oxalate 20 Mg Tablet 20 Mg PO DAILY


[Barrier Cream]    TOP BID


     APPLY TO BILATERAL BUTTOCKS FOR PREVENTATIVE MEASURES


Zofran (Ondansetron HCl) 4 Mg Tab 4 Mg PO Q6H PRN


Magnesium Oxide 400 Mg Tablet 400 Mg PO BID


Feosol (Ferrous Sulfate) 325 Mg Tablet 325 Mg PO DAILY


Benadryl (Diphenhydramine HCl) 25 Mg Capsule 25 Mg PO Q8H PRN


Clonazepam 0.5 Mg Tab.rapdis 0.5 Mg PO HS


Ventolin Hfa (Albuterol Sulfate) 18 Gm Hfa.aer.ad 2 Puff IH Q8H PRN


Tramadol HCl 50 Mg Tablet 50 Mg PO Q12H PRN


Milk of Magnesia (Magnesium Hydroxide) 400 Mg/5 Ml Oral.susp 30 Ml PO Q12H PRN


Clopidogrel (Clopidogrel Bisulfate) 75 Mg Tablet 75 Mg PO DAILY


Atorvastatin Calcium 40 Mg Tablet 40 Mg PO HS





Instructions to patient/family


Please see electronic discharge instructions given to patient.





Clinical Quality Measures


DVT/VTE Risk/Contraindication:


Risk Factor Score Per Nursin


RFS Level Per Nursing on Admit:  4+=Very High


Contraindications-Pharm:  Other *list below*











RODY MOSER DO Oct 3, 2018 07:48

## 2018-10-08 NOTE — PHYSICIAN QUERY CLARIFICATION
PQ-Conflicting Diagnosis


Admission/Discharge


Admission Date: Sep 26, 2018 at 18:50 


Discharge Date:  Oct 2, 2018 at 16:01








The medical record reflects the following clinical scenario:





History/Risk Factors:


C. Diff colitis, Brain tumor, DM2, HTN w/CKD, CAD





Clinical Findings:


T100.1, P104, R 20, WBC 15.0 Lactic 1.32, AMS





Treatment:


Cefepime, Ceftriaxone





Question:


Do you agree with the impression of the Sepsis per  and Dr. Singer? 

Please document a response below.





PHYSICIAN RESPONSE


Do you agree w/Consulting Dx?:  Yes








In responding to this query, please exercise your independent professional 

judgment.  The purpose of this communication is to more accurately reflect the 

complexity of your patients condition. The fact that a question is asked does 

not imply that any particular answer is desired or expected.  





Thank you for your timely response to this clarification.      


   


Requestors name: Geovanny   





Phone # [phone]








THIS PHYSICIAN QUERY FORM IS A PERMANENT PART OF THE MEDICAL RECORD











GEOVANNY GUILLEN Oct 8, 2018 13:47


RODY MOSER DO Oct 8, 2018 18:42

## 2018-11-30 ENCOUNTER — HOSPITAL ENCOUNTER (OUTPATIENT)
Dept: HOSPITAL 75 - RAD | Age: 68
End: 2018-11-30
Attending: FAMILY MEDICINE
Payer: MEDICARE

## 2018-11-30 DIAGNOSIS — M47.814: Primary | ICD-10-CM

## 2018-11-30 PROCEDURE — 72072 X-RAY EXAM THORAC SPINE 3VWS: CPT

## 2018-11-30 NOTE — DIAGNOSTIC IMAGING REPORT
INDICATION: Back pain.



TIME OF EXAM: 10:16 a.m.



FINDINGS: Curvature and alignment of the thoracic spine is

normal. Marked multilevel degenerative disc disease is seen with

anterior osteophyte formation throughout the thoracic spine.

Vertebral body heights appear to be fairly well maintained. No

definite acute compression fracture is identified. The pedicles

and paraspinous line are intact.



IMPRESSION: Thoracic spondylosis. No acute bony abnormality is

detected.



Dictated by: 



  Dictated on workstation # [id number]

## 2018-12-06 ENCOUNTER — HOSPITAL ENCOUNTER (OUTPATIENT)
Dept: HOSPITAL 75 - WOUNDCARE | Age: 68
End: 2018-12-06
Attending: ORTHOPAEDIC SURGERY
Payer: MEDICARE

## 2018-12-06 DIAGNOSIS — L89.153: Primary | ICD-10-CM

## 2018-12-06 DIAGNOSIS — I73.9: ICD-10-CM

## 2018-12-06 PROCEDURE — 11042 DBRDMT SUBQ TIS 1ST 20SQCM/<: CPT

## 2018-12-20 ENCOUNTER — HOSPITAL ENCOUNTER (OUTPATIENT)
Dept: HOSPITAL 75 - WOUNDCARE | Age: 68
End: 2018-12-20
Attending: ORTHOPAEDIC SURGERY
Payer: MEDICARE

## 2018-12-20 DIAGNOSIS — I73.9: ICD-10-CM

## 2018-12-20 DIAGNOSIS — L89.153: Primary | ICD-10-CM

## 2018-12-20 PROCEDURE — 11042 DBRDMT SUBQ TIS 1ST 20SQCM/<: CPT

## 2019-01-02 ENCOUNTER — HOSPITAL ENCOUNTER (OUTPATIENT)
Dept: HOSPITAL 75 - RAD | Age: 69
End: 2019-01-02
Attending: FAMILY MEDICINE
Payer: MEDICARE

## 2019-01-02 DIAGNOSIS — M79.671: ICD-10-CM

## 2019-01-02 DIAGNOSIS — M79.674: Primary | ICD-10-CM

## 2019-01-02 PROCEDURE — 73630 X-RAY EXAM OF FOOT: CPT

## 2019-01-17 ENCOUNTER — HOSPITAL ENCOUNTER (OUTPATIENT)
Dept: HOSPITAL 75 - WOUNDCARE | Age: 69
End: 2019-01-17
Attending: NURSE PRACTITIONER
Payer: MEDICARE

## 2019-01-17 DIAGNOSIS — L89.153: Primary | ICD-10-CM

## 2019-01-17 DIAGNOSIS — I73.9: ICD-10-CM

## 2019-01-17 PROCEDURE — 99212 OFFICE O/P EST SF 10 MIN: CPT

## 2019-01-31 ENCOUNTER — HOSPITAL ENCOUNTER (OUTPATIENT)
Dept: HOSPITAL 75 - WOUNDCARE | Age: 69
End: 2019-01-31
Attending: NURSE PRACTITIONER
Payer: MEDICARE

## 2019-01-31 DIAGNOSIS — L89.153: Primary | ICD-10-CM

## 2019-01-31 DIAGNOSIS — I73.9: ICD-10-CM

## 2019-01-31 PROCEDURE — 11042 DBRDMT SUBQ TIS 1ST 20SQCM/<: CPT

## 2019-02-21 ENCOUNTER — HOSPITAL ENCOUNTER (OUTPATIENT)
Dept: HOSPITAL 75 - WOUNDCARE | Age: 69
End: 2019-02-21
Attending: NURSE PRACTITIONER
Payer: MEDICARE

## 2019-02-21 DIAGNOSIS — I73.9: ICD-10-CM

## 2019-02-21 DIAGNOSIS — L89.153: Primary | ICD-10-CM

## 2019-02-21 PROCEDURE — 99213 OFFICE O/P EST LOW 20 MIN: CPT

## 2019-03-07 ENCOUNTER — HOSPITAL ENCOUNTER (OUTPATIENT)
Dept: HOSPITAL 75 - WOUNDCARE | Age: 69
End: 2019-03-07
Attending: NURSE PRACTITIONER
Payer: MEDICARE

## 2019-03-07 DIAGNOSIS — L89.153: Primary | ICD-10-CM

## 2019-03-07 DIAGNOSIS — I73.9: ICD-10-CM

## 2019-03-07 PROCEDURE — 99213 OFFICE O/P EST LOW 20 MIN: CPT

## 2019-03-21 ENCOUNTER — HOSPITAL ENCOUNTER (OUTPATIENT)
Dept: HOSPITAL 75 - WOUNDCARE | Age: 69
End: 2019-03-21
Attending: NURSE PRACTITIONER
Payer: MEDICARE

## 2019-03-21 DIAGNOSIS — I73.9: ICD-10-CM

## 2019-03-21 DIAGNOSIS — L89.153: Primary | ICD-10-CM

## 2019-03-21 PROCEDURE — 99213 OFFICE O/P EST LOW 20 MIN: CPT

## 2019-04-04 ENCOUNTER — HOSPITAL ENCOUNTER (OUTPATIENT)
Dept: HOSPITAL 75 - WOUNDCARE | Age: 69
End: 2019-04-04
Attending: NURSE PRACTITIONER
Payer: MEDICARE

## 2019-04-04 DIAGNOSIS — I73.9: ICD-10-CM

## 2019-04-04 DIAGNOSIS — L89.153: Primary | ICD-10-CM

## 2019-04-04 PROCEDURE — 99212 OFFICE O/P EST SF 10 MIN: CPT

## 2019-04-11 ENCOUNTER — HOSPITAL ENCOUNTER (OUTPATIENT)
Dept: HOSPITAL 75 - WOUNDCARE | Age: 69
End: 2019-04-11
Attending: NURSE PRACTITIONER
Payer: MEDICARE

## 2019-04-11 DIAGNOSIS — I73.9: ICD-10-CM

## 2019-04-11 DIAGNOSIS — L89.153: Primary | ICD-10-CM

## 2019-04-11 PROCEDURE — 99212 OFFICE O/P EST SF 10 MIN: CPT

## 2019-04-23 ENCOUNTER — HOSPITAL ENCOUNTER (OUTPATIENT)
Dept: HOSPITAL 75 - WOUNDCARE | Age: 69
End: 2019-04-23
Attending: NURSE PRACTITIONER
Payer: MEDICARE

## 2019-04-23 DIAGNOSIS — I73.9: ICD-10-CM

## 2019-04-23 DIAGNOSIS — L89.153: Primary | ICD-10-CM

## 2019-04-23 PROCEDURE — 99212 OFFICE O/P EST SF 10 MIN: CPT

## 2019-10-22 ENCOUNTER — HOSPITAL ENCOUNTER (OUTPATIENT)
Dept: HOSPITAL 75 - RAD | Age: 69
End: 2019-10-22
Attending: FAMILY MEDICINE
Payer: MEDICARE

## 2019-10-22 DIAGNOSIS — Z53.8: ICD-10-CM

## 2019-10-22 DIAGNOSIS — M54.5: Primary | ICD-10-CM

## 2019-10-23 ENCOUNTER — HOSPITAL ENCOUNTER (OUTPATIENT)
Dept: HOSPITAL 75 - RAD | Age: 69
End: 2019-10-23
Attending: FAMILY MEDICINE
Payer: MEDICARE

## 2019-10-23 DIAGNOSIS — M47.816: Primary | ICD-10-CM

## 2019-10-23 DIAGNOSIS — M51.36: ICD-10-CM

## 2019-10-23 PROCEDURE — 72100 X-RAY EXAM L-S SPINE 2/3 VWS: CPT

## 2019-10-23 NOTE — DIAGNOSTIC IMAGING REPORT
INDICATION: Low back pain.



Lumbar spine.



FINDINGS: AP and lateral views of the lumbar spine show normal

alignment. There are no compression fractures. There is disc

space narrowing at T12-L1, L1-L2, L3-L4, L4-L5 and L5-S1. There

are small osteophytes forming anteriorly. There is calcific

atherosclerosis of the aorta.



IMPRESSION: Spondylosis deformans with degenerative disc changes.

There is no acute abnormality seen in the lumbar spine.



Dictated by: 



  Dictated on workstation # [id number]

## 2020-02-17 ENCOUNTER — HOSPITAL ENCOUNTER (OUTPATIENT)
Dept: HOSPITAL 75 - LAB | Age: 70
End: 2020-02-17
Attending: FAMILY MEDICINE
Payer: MEDICARE

## 2020-02-17 DIAGNOSIS — R06.2: ICD-10-CM

## 2020-02-17 DIAGNOSIS — R05: Primary | ICD-10-CM

## 2020-02-17 LAB
BASOPHILS # BLD AUTO: 0 10^3/UL (ref 0–0.1)
BASOPHILS NFR BLD AUTO: 0 % (ref 0–10)
EOSINOPHIL # BLD AUTO: 0.1 10^3/UL (ref 0–0.3)
EOSINOPHIL NFR BLD AUTO: 1 % (ref 0–10)
ERYTHROCYTE [DISTWIDTH] IN BLOOD BY AUTOMATED COUNT: 15.6 % (ref 10–14.5)
HCT VFR BLD CALC: 41 % (ref 35–52)
HGB BLD-MCNC: 13.5 G/DL (ref 11.5–16)
LYMPHOCYTES # BLD AUTO: 1.7 X 10^3 (ref 1–4)
LYMPHOCYTES NFR BLD AUTO: 14 % (ref 12–44)
MANUAL DIFFERENTIAL PERFORMED BLD QL: NO
MCH RBC QN AUTO: 29 PG (ref 25–34)
MCHC RBC AUTO-ENTMCNC: 33 G/DL (ref 32–36)
MCV RBC AUTO: 88 FL (ref 80–99)
MONOCYTES # BLD AUTO: 0.7 X 10^3 (ref 0–1)
MONOCYTES NFR BLD AUTO: 6 % (ref 0–12)
NEUTROPHILS # BLD AUTO: 9.9 X 10^3 (ref 1.8–7.8)
NEUTROPHILS NFR BLD AUTO: 79 % (ref 42–75)
PLATELET # BLD: 443 10^3/UL (ref 130–400)
PMV BLD AUTO: 10.1 FL (ref 7.4–10.4)
WBC # BLD AUTO: 12.5 10^3/UL (ref 4.3–11)

## 2020-02-17 PROCEDURE — 71045 X-RAY EXAM CHEST 1 VIEW: CPT

## 2020-02-17 PROCEDURE — 85025 COMPLETE CBC W/AUTO DIFF WBC: CPT

## 2020-02-17 PROCEDURE — 36415 COLL VENOUS BLD VENIPUNCTURE: CPT

## 2020-02-17 NOTE — DIAGNOSTIC IMAGING REPORT
INDICATION: 

Cough and congestion.



EXAMINATION:

Portable chest at 4:59 PM.



FINDINGS:

The heart size and pulmonary vascularity are normal. The lungs

are clear. There are no effusions or pneumothoraces.



IMPRESSION: 

Negative chest.



Dictated by: 



  Dictated on workstation # [id number]

## 2020-02-21 ENCOUNTER — HOSPITAL ENCOUNTER (OUTPATIENT)
Dept: HOSPITAL 75 - RAD | Age: 70
End: 2020-02-21
Attending: FAMILY MEDICINE
Payer: MEDICARE

## 2020-02-21 DIAGNOSIS — R05: Primary | ICD-10-CM

## 2020-02-21 DIAGNOSIS — R09.89: ICD-10-CM

## 2020-02-21 LAB
ACANTHOCYTES BLD QL SMEAR: SLIGHT
BASOPHILS # BLD AUTO: 0.1 10^3/UL (ref 0–0.1)
BASOPHILS NFR BLD AUTO: 0 % (ref 0–10)
EOSINOPHIL # BLD AUTO: 0.1 10^3/UL (ref 0–0.3)
EOSINOPHIL NFR BLD AUTO: 0 % (ref 0–10)
ERYTHROCYTE [DISTWIDTH] IN BLOOD BY AUTOMATED COUNT: 15.1 % (ref 10–14.5)
HCT VFR BLD CALC: 29 % (ref 35–52)
HGB BLD-MCNC: 9.2 G/DL (ref 11.5–16)
LYMPHOCYTES # BLD AUTO: 3.5 X 10^3 (ref 1–4)
LYMPHOCYTES NFR BLD AUTO: 19 % (ref 12–44)
MANUAL DIFFERENTIAL PERFORMED BLD QL: YES
MCH RBC QN AUTO: 29 PG (ref 25–34)
MCHC RBC AUTO-ENTMCNC: 32 G/DL (ref 32–36)
MCV RBC AUTO: 89 FL (ref 80–99)
MONOCYTES # BLD AUTO: 1.6 X 10^3 (ref 0–1)
MONOCYTES NFR BLD AUTO: 9 % (ref 0–12)
MONOCYTES NFR BLD: 5 %
NEUTROPHILS # BLD AUTO: 13.4 X 10^3 (ref 1.8–7.8)
NEUTROPHILS NFR BLD AUTO: 72 % (ref 42–75)
NEUTS BAND NFR BLD MANUAL: 70 %
PLATELET # BLD: 461 10^3/UL (ref 130–400)
PMV BLD AUTO: 10.5 FL (ref 7.4–10.4)
POIKILOCYTOSIS BLD QL SMEAR: SLIGHT
POLYCHROMASIA BLD QL SMEAR: SLIGHT
VARIANT LYMPHS NFR BLD MANUAL: 25 %
WBC # BLD AUTO: 18.7 10^3/UL (ref 4.3–11)

## 2020-02-21 PROCEDURE — 85007 BL SMEAR W/DIFF WBC COUNT: CPT

## 2020-02-21 PROCEDURE — 85027 COMPLETE CBC AUTOMATED: CPT

## 2020-02-21 PROCEDURE — 36415 COLL VENOUS BLD VENIPUNCTURE: CPT

## 2020-02-21 PROCEDURE — 71045 X-RAY EXAM CHEST 1 VIEW: CPT

## 2020-02-21 NOTE — DIAGNOSTIC IMAGING REPORT
Indication: Cough and congestion



Portable chest 10:38 AM



Heart size and pulmonary vascularity are normal. Lungs are clear.

There are no effusions or pneumothoraces.



IMPRESSION: Negative chest



Dictated by: 



  Dictated on workstation # [id number]

## 2021-09-10 ENCOUNTER — HOSPITAL ENCOUNTER (OUTPATIENT)
Dept: HOSPITAL 75 - RAD | Age: 71
End: 2021-09-10
Attending: FAMILY MEDICINE
Payer: MEDICARE

## 2021-09-10 DIAGNOSIS — R13.12: Primary | ICD-10-CM

## 2021-09-10 DIAGNOSIS — I69.891: ICD-10-CM

## 2021-09-10 PROCEDURE — 74230 X-RAY XM SWLNG FUNCJ C+: CPT

## 2021-09-10 NOTE — ST MOD BARIUM SWALLOW
Speech Evaluation-General


Medical Diagnosis


dysphagia


Onset Date:  Sep 10, 2021





Therapy Diagnosis


Therapy Diagnosis:  oropharyngeal dysphagia





Precautions


Precautions:  Fall


Precautions/Isolations:  Aspiration





Referral


Referring Physician:  Margarito Covington


Reason for Referral:  Consult


Pt was seen for modified barium swallow study. Concerns regarding coughing 

during mealtime.





Medical History


Pertinent Medical History:  Arthritis, CAD, COPD, CVA, DM, HTN, Renal 

Insufficiency, Smoking


HTN, CVA, COPD


Current History


oropharyngeal dysphagia


Reviewed History:  Yes





Social History


Home:  [facility]isted Living


Current Living Status:  





Speech Mod Barium Swallow


Prior Level of Function


Pt reported puree diet with thin liquids via straw. Pt reported coughing during 

mealtimes and rare choking events. Pt reported vomiting after mealtime x1, but 

otherwise no emesis concerns.





Oral Motor Skills


Dentition Natural Dentures:  Partial Upper, Partial Lower


Dentition Comments:  Pt missing most teeth; does not have dentures


Lingual Protrusion:  Normal


Lingual ROM:  Normal


Lingual Strength:  Abnormal


Velum:  Normal


Volitional Dry Swallow:  Yes


Gag Reflex:  Yes


Voluntary Cough Comments:  did not assess


did not assess





Textures-Lateral View


Lateral View Food Presentation:  Thin Liquid via Straw





Oral Phase


Labial Closure:  No Impairment (WFL)


Bolus Formation Pooling L/R:  No Impairment (WFL)


Bolus Formation Placement:  No Impairment (WFL)


Mastication Rotary Chew:  Severe Impairment


effortful chewing and increased mastication time >3 minutes for small bite


A/P Lingual Propulsion:  No Impairment (WFL)


Lingual Movement:  No Impairment (WFL)


Oral Phase Residue:  No Impairment (WFL)


no oral residue observed for mech soft and puree textures





Pharyngeal Phase


Swallow Response:  No Impairment (WFL)


Base of Tongue:  No Impairment (WFL)


Epiglottic Movement:  No Impairment (WFL)


Laryngeal Elevation:  No Impairment (WFL)


Vallecular Residue:  Swallow to Clear


Pharyngeal Wall Residue:  No Impairment (WFL)


Piriform Sinus Residue:  Swallow to Clear


Laryngeal Penetration:  None


Aspiration Observations:  None


Other Pharyngeal Observations:  


Did not demonstrate s/s aspiration. No coughing or choking observed.





Esophageal Phase


Peristalsis:  WFL


A/P Esophageal Propulsion Time:  Less Than 5 Seconds Normal





Performed-A/P View


Not Applicable/Performed





Summary/Impressions


Oral Phase Impression:  Moderate Impairment


Pt demonstrated effortful mastication with mechanical soft solids. Pt requires 

additional time to chew mechanical soft solids that are small and bite-sized. Pt

safely cleared solid during study, but fatigue may impact swallowing safety and 

mastication.





Speech-Plan


Patient/Family Goals


Patient/Family Goals:  


Return to SNF





Treatment Plan


Speech Therapy Treatment Plan:  Discontinue ST


Pt seen for MBSS. Pt tolerated thin liquids, puree, and mechanical soft solids. 

If solids are bite-sized and soft textured, pt may require additional time for 

chewing and safe swallowing. No s/s aspiration. Pt educated on safe swallow 

precautions including multiple swallows and thin liquid following solids.


Treatment Duration:  Sep 10, 2021


Frequency:  1 time per week


Estimated Hrs Per Day:  Other


Rehab Potential:  Good


Barriers to Learning:  


alertness


Pt/Family Agrees to Plan:  Yes





Safety Risks/Education


Teaching Recipient:  Patient, Health Care Proxy


Teaching Methods:  Discussion


Response to Teaching:  Verbalize Understanding


Education Topics Provided:  


safe swallow precautions and results of MBSS





Time


Speech Therapy Time In:  10:30


Speech Therapy Time Out:  11:00


Total Billed Time:  30


Billed Treatment Time


1, MOD


No











FEI QUEEN                Sep 10, 2021 11:13

## 2021-09-10 NOTE — DIAGNOSTIC IMAGING REPORT
INDICATION: Dysphagia.



Procedure was performed in conjunction with speech pathology.

Video fluoroscopy was performed during swallowing of barium of

multiple consistencies.



Patient ingested thin barium as well as applesauce and banana

consistency. A total of 2 minutes 11 seconds fluoroscopic time

was utilized.



Oral phase grossly unremarkable. There is normal epiglottic tilt

and laryngeal elevation. There was mild early spillover noted

during the swallowing of thin barium. No laryngeal penetration or

aspiration was observed with any consistency.



IMPRESSION: Essentially unremarkable modified barium swallow. No

penetration or aspiration was observed.



Dictated by: 



  Dictated on workstation # [id number]

## 2021-09-27 ENCOUNTER — HOSPITAL ENCOUNTER (OUTPATIENT)
Dept: HOSPITAL 75 - WOUNDCARE | Age: 71
End: 2021-09-27
Attending: SURGERY
Payer: MEDICARE

## 2021-09-27 DIAGNOSIS — E66.01: ICD-10-CM

## 2021-09-27 DIAGNOSIS — L22: ICD-10-CM

## 2021-09-27 DIAGNOSIS — I70.244: ICD-10-CM

## 2021-09-27 DIAGNOSIS — L89.612: Primary | ICD-10-CM

## 2021-09-27 DIAGNOSIS — B35.6: ICD-10-CM

## 2021-09-27 DIAGNOSIS — L97.121: ICD-10-CM

## 2021-09-27 DIAGNOSIS — T65.222A: ICD-10-CM

## 2021-09-27 DIAGNOSIS — R54: ICD-10-CM

## 2021-09-27 DIAGNOSIS — F17.218: ICD-10-CM

## 2021-09-27 DIAGNOSIS — E11.621: ICD-10-CM

## 2021-09-27 DIAGNOSIS — L97.511: ICD-10-CM

## 2021-09-27 PROCEDURE — 99215 OFFICE O/P EST HI 40 MIN: CPT

## 2021-10-04 ENCOUNTER — HOSPITAL ENCOUNTER (OUTPATIENT)
Dept: HOSPITAL 75 - RAD | Age: 71
End: 2021-10-04
Attending: SURGERY
Payer: MEDICARE

## 2021-10-04 ENCOUNTER — HOSPITAL ENCOUNTER (OUTPATIENT)
Dept: HOSPITAL 75 - WOUNDCARE | Age: 71
End: 2021-10-04
Attending: SURGERY
Payer: MEDICARE

## 2021-10-04 DIAGNOSIS — F17.218: ICD-10-CM

## 2021-10-04 DIAGNOSIS — I70.244: Primary | ICD-10-CM

## 2021-10-04 DIAGNOSIS — R54: ICD-10-CM

## 2021-10-04 DIAGNOSIS — I96: ICD-10-CM

## 2021-10-04 DIAGNOSIS — E11.621: ICD-10-CM

## 2021-10-04 DIAGNOSIS — T65.222A: ICD-10-CM

## 2021-10-04 DIAGNOSIS — E66.01: ICD-10-CM

## 2021-10-04 DIAGNOSIS — L97.511: ICD-10-CM

## 2021-10-04 DIAGNOSIS — L89.612: ICD-10-CM

## 2021-10-04 PROCEDURE — 99213 OFFICE O/P EST LOW 20 MIN: CPT

## 2021-10-04 PROCEDURE — 93923 UPR/LXTR ART STDY 3+ LVLS: CPT

## 2021-10-04 NOTE — DIAGNOSTIC IMAGING REPORT
EXAM: Peripheral vascular disease 



COMPARISON: None.



FINDINGS: 



Ankle brachial indices were obtained from the posterior tibial

arteries bilaterally. The right LEIDY 0.72, the left LEIDY 0.53.



IMPRESSION: 



Abnormal ABIs.



Dictated by: 



  Dictated on workstation # [id number]

## 2021-10-11 ENCOUNTER — HOSPITAL ENCOUNTER (OUTPATIENT)
Dept: HOSPITAL 75 - WOUNDCARE | Age: 71
End: 2021-10-11
Attending: SURGERY
Payer: MEDICARE

## 2021-10-11 DIAGNOSIS — L89.613: ICD-10-CM

## 2021-10-11 DIAGNOSIS — F17.218: ICD-10-CM

## 2021-10-11 DIAGNOSIS — I70.244: Primary | ICD-10-CM

## 2021-10-11 DIAGNOSIS — E11.621: ICD-10-CM

## 2021-10-11 DIAGNOSIS — R54: ICD-10-CM

## 2021-10-11 DIAGNOSIS — E11.52: ICD-10-CM

## 2021-10-11 DIAGNOSIS — T65.222A: ICD-10-CM

## 2021-10-11 DIAGNOSIS — E66.01: ICD-10-CM

## 2021-10-11 PROCEDURE — 99212 OFFICE O/P EST SF 10 MIN: CPT

## 2021-10-20 ENCOUNTER — HOSPITAL ENCOUNTER (OUTPATIENT)
Dept: HOSPITAL 75 - WOUNDCARE | Age: 71
End: 2021-10-20
Attending: FAMILY MEDICINE
Payer: MEDICARE

## 2021-10-20 DIAGNOSIS — R54: ICD-10-CM

## 2021-10-20 DIAGNOSIS — I70.262: ICD-10-CM

## 2021-10-20 DIAGNOSIS — T65.222A: ICD-10-CM

## 2021-10-20 DIAGNOSIS — E11.621: Primary | ICD-10-CM

## 2021-10-20 DIAGNOSIS — F17.218: ICD-10-CM

## 2021-10-20 DIAGNOSIS — L89.613: ICD-10-CM

## 2021-10-20 DIAGNOSIS — E66.01: ICD-10-CM

## 2021-10-20 DIAGNOSIS — E11.52: ICD-10-CM

## 2021-10-20 PROCEDURE — 99213 OFFICE O/P EST LOW 20 MIN: CPT

## 2021-10-28 ENCOUNTER — HOSPITAL ENCOUNTER (OUTPATIENT)
Dept: HOSPITAL 75 - WOUNDCARE | Age: 71
End: 2021-10-28
Attending: FAMILY MEDICINE
Payer: MEDICARE

## 2021-10-28 DIAGNOSIS — E11.52: ICD-10-CM

## 2021-10-28 DIAGNOSIS — L89.613: ICD-10-CM

## 2021-10-28 DIAGNOSIS — I70.244: Primary | ICD-10-CM

## 2021-10-28 DIAGNOSIS — T65.222A: ICD-10-CM

## 2021-10-28 DIAGNOSIS — F17.218: ICD-10-CM

## 2021-10-28 DIAGNOSIS — E66.01: ICD-10-CM

## 2021-10-28 DIAGNOSIS — E11.621: ICD-10-CM

## 2021-10-28 DIAGNOSIS — R54: ICD-10-CM

## 2021-10-28 PROCEDURE — 99212 OFFICE O/P EST SF 10 MIN: CPT

## 2021-11-03 ENCOUNTER — HOSPITAL ENCOUNTER (OUTPATIENT)
Dept: HOSPITAL 75 - WOUNDCARE | Age: 71
End: 2021-11-03
Attending: FAMILY MEDICINE
Payer: MEDICARE

## 2021-11-03 DIAGNOSIS — E11.621: ICD-10-CM

## 2021-11-03 DIAGNOSIS — E11.52: Primary | ICD-10-CM

## 2021-11-03 DIAGNOSIS — L89.620: ICD-10-CM

## 2021-11-03 DIAGNOSIS — R54: ICD-10-CM

## 2021-11-03 DIAGNOSIS — T65.222A: ICD-10-CM

## 2021-11-03 DIAGNOSIS — L97.522: ICD-10-CM

## 2021-11-03 DIAGNOSIS — E66.01: ICD-10-CM

## 2021-11-03 DIAGNOSIS — I70.262: ICD-10-CM

## 2021-11-03 DIAGNOSIS — F17.218: ICD-10-CM

## 2021-11-03 PROCEDURE — 99213 OFFICE O/P EST LOW 20 MIN: CPT

## 2021-11-19 ENCOUNTER — HOSPITAL ENCOUNTER (OUTPATIENT)
Dept: HOSPITAL 75 - WOUNDCARE | Age: 71
End: 2021-11-19
Attending: FAMILY MEDICINE
Payer: MEDICARE

## 2021-11-19 DIAGNOSIS — F17.218: ICD-10-CM

## 2021-11-19 DIAGNOSIS — R54: ICD-10-CM

## 2021-11-19 DIAGNOSIS — I70.244: Primary | ICD-10-CM

## 2021-11-19 DIAGNOSIS — L89.620: ICD-10-CM

## 2021-11-19 DIAGNOSIS — E11.52: ICD-10-CM

## 2021-11-19 DIAGNOSIS — E11.621: ICD-10-CM

## 2021-11-19 DIAGNOSIS — E66.01: ICD-10-CM

## 2021-11-19 DIAGNOSIS — T65.222A: ICD-10-CM

## 2021-11-19 PROCEDURE — 99213 OFFICE O/P EST LOW 20 MIN: CPT

## 2021-12-03 ENCOUNTER — HOSPITAL ENCOUNTER (OUTPATIENT)
Dept: HOSPITAL 75 - WOUNDCARE | Age: 71
End: 2021-12-03
Attending: FAMILY MEDICINE
Payer: MEDICARE

## 2021-12-03 DIAGNOSIS — F17.218: ICD-10-CM

## 2021-12-03 DIAGNOSIS — L89.610: ICD-10-CM

## 2021-12-03 DIAGNOSIS — L89.153: ICD-10-CM

## 2021-12-03 DIAGNOSIS — I70.244: Primary | ICD-10-CM

## 2021-12-03 DIAGNOSIS — L89.890: ICD-10-CM

## 2021-12-03 DIAGNOSIS — L89.620: ICD-10-CM

## 2021-12-03 DIAGNOSIS — R54: ICD-10-CM

## 2021-12-03 DIAGNOSIS — E66.01: ICD-10-CM

## 2021-12-03 DIAGNOSIS — E11.52: ICD-10-CM

## 2021-12-03 DIAGNOSIS — E11.621: ICD-10-CM

## 2021-12-03 DIAGNOSIS — I70.234: ICD-10-CM

## 2021-12-03 DIAGNOSIS — T65.222A: ICD-10-CM

## 2021-12-03 PROCEDURE — 99213 OFFICE O/P EST LOW 20 MIN: CPT

## 2021-12-17 ENCOUNTER — HOSPITAL ENCOUNTER (OUTPATIENT)
Dept: HOSPITAL 75 - WOUNDCARE | Age: 71
End: 2021-12-17
Attending: FAMILY MEDICINE
Payer: MEDICARE

## 2021-12-17 DIAGNOSIS — F17.218: ICD-10-CM

## 2021-12-17 DIAGNOSIS — E11.621: ICD-10-CM

## 2021-12-17 DIAGNOSIS — I70.244: Primary | ICD-10-CM

## 2021-12-17 DIAGNOSIS — L89.890: ICD-10-CM

## 2021-12-17 DIAGNOSIS — E11.52: ICD-10-CM

## 2021-12-17 DIAGNOSIS — E66.01: ICD-10-CM

## 2021-12-17 DIAGNOSIS — T65.222A: ICD-10-CM

## 2021-12-17 DIAGNOSIS — L89.153: ICD-10-CM

## 2021-12-17 DIAGNOSIS — L89.610: ICD-10-CM

## 2021-12-17 DIAGNOSIS — R54: ICD-10-CM

## 2021-12-17 DIAGNOSIS — I70.234: ICD-10-CM

## 2021-12-17 DIAGNOSIS — L89.624: ICD-10-CM

## 2021-12-17 PROCEDURE — 97597 DBRDMT OPN WND 1ST 20 CM/<: CPT

## 2021-12-29 ENCOUNTER — HOSPITAL ENCOUNTER (OUTPATIENT)
Dept: HOSPITAL 75 - WOUNDCARE | Age: 71
End: 2021-12-29
Attending: FAMILY MEDICINE
Payer: MEDICARE

## 2021-12-29 DIAGNOSIS — I70.234: ICD-10-CM

## 2021-12-29 DIAGNOSIS — L89.890: ICD-10-CM

## 2021-12-29 DIAGNOSIS — I70.244: Primary | ICD-10-CM

## 2021-12-29 DIAGNOSIS — E11.52: ICD-10-CM

## 2021-12-29 DIAGNOSIS — E66.01: ICD-10-CM

## 2021-12-29 DIAGNOSIS — F17.218: ICD-10-CM

## 2021-12-29 DIAGNOSIS — L89.610: ICD-10-CM

## 2021-12-29 DIAGNOSIS — R54: ICD-10-CM

## 2021-12-29 DIAGNOSIS — E11.621: ICD-10-CM

## 2021-12-29 DIAGNOSIS — L89.153: ICD-10-CM

## 2021-12-29 DIAGNOSIS — L89.624: ICD-10-CM

## 2021-12-29 DIAGNOSIS — T65.222A: ICD-10-CM

## 2021-12-29 PROCEDURE — 11042 DBRDMT SUBQ TIS 1ST 20SQCM/<: CPT

## 2023-02-26 NOTE — DIAGNOSTIC IMAGING REPORT
INDICATION: 

Pain in the right fifth toe.



COMPARISON STUDY: 

None.



FINDINGS: 

Three views of the right foot demonstrate osteopenia. No fracture

or subluxation is present. Mild degenerative change is present in

the first interphalangeal joint. Small plantar spurs are present.

Calcification is seen in the dorsalis pedis artery.



IMPRESSION: 

There are no acute findings to the right foot.



Dictated by: 



  Dictated on workstation # [id number] negative...
